# Patient Record
Sex: FEMALE | Race: WHITE | NOT HISPANIC OR LATINO | Employment: OTHER | URBAN - METROPOLITAN AREA
[De-identification: names, ages, dates, MRNs, and addresses within clinical notes are randomized per-mention and may not be internally consistent; named-entity substitution may affect disease eponyms.]

---

## 2018-02-19 ENCOUNTER — OFFICE VISIT CONVERTED (OUTPATIENT)
Dept: FAMILY MEDICINE CLINIC | Facility: CLINIC | Age: 78
End: 2018-02-19
Attending: FAMILY MEDICINE

## 2018-02-28 ENCOUNTER — OFFICE VISIT CONVERTED (OUTPATIENT)
Dept: ORTHOPEDIC SURGERY | Facility: CLINIC | Age: 78
End: 2018-02-28
Attending: PHYSICIAN ASSISTANT

## 2018-03-27 ENCOUNTER — OFFICE VISIT CONVERTED (OUTPATIENT)
Dept: FAMILY MEDICINE CLINIC | Facility: CLINIC | Age: 78
End: 2018-03-27
Attending: FAMILY MEDICINE

## 2018-03-27 ENCOUNTER — CONVERSION ENCOUNTER (OUTPATIENT)
Dept: FAMILY MEDICINE CLINIC | Facility: CLINIC | Age: 78
End: 2018-03-27

## 2018-05-11 ENCOUNTER — CONVERSION ENCOUNTER (OUTPATIENT)
Dept: FAMILY MEDICINE CLINIC | Facility: CLINIC | Age: 78
End: 2018-05-11

## 2018-05-11 ENCOUNTER — OFFICE VISIT CONVERTED (OUTPATIENT)
Dept: FAMILY MEDICINE CLINIC | Facility: CLINIC | Age: 78
End: 2018-05-11
Attending: FAMILY MEDICINE

## 2018-08-14 ENCOUNTER — OFFICE VISIT CONVERTED (OUTPATIENT)
Dept: FAMILY MEDICINE CLINIC | Facility: CLINIC | Age: 78
End: 2018-08-14
Attending: FAMILY MEDICINE

## 2018-08-14 ENCOUNTER — CONVERSION ENCOUNTER (OUTPATIENT)
Dept: FAMILY MEDICINE CLINIC | Facility: CLINIC | Age: 78
End: 2018-08-14

## 2018-12-21 ENCOUNTER — OFFICE VISIT CONVERTED (OUTPATIENT)
Dept: FAMILY MEDICINE CLINIC | Facility: CLINIC | Age: 78
End: 2018-12-21
Attending: FAMILY MEDICINE

## 2019-04-26 ENCOUNTER — HOSPITAL ENCOUNTER (OUTPATIENT)
Dept: FAMILY MEDICINE CLINIC | Facility: CLINIC | Age: 79
Discharge: HOME | End: 2019-04-26
Attending: FAMILY MEDICINE

## 2019-04-26 LAB
ALBUMIN SERPL-MCNC: 4.1 G/DL (ref 3.5–5)
ALBUMIN/GLOB SERPL: 1.2 {RATIO} (ref 1.4–2.6)
ALP SERPL-CCNC: 70 U/L (ref 43–160)
ALT SERPL-CCNC: 14 U/L (ref 10–40)
ANION GAP SERPL CALC-SCNC: 17 MMOL/L (ref 8–19)
AST SERPL-CCNC: 21 U/L (ref 15–50)
BILIRUB SERPL-MCNC: 0.55 MG/DL (ref 0.2–1.3)
BUN SERPL-MCNC: 18 MG/DL (ref 5–25)
BUN/CREAT SERPL: 16 {RATIO} (ref 6–20)
CALCIUM SERPL-MCNC: 9.4 MG/DL (ref 8.7–10.4)
CHLORIDE SERPL-SCNC: 99 MMOL/L (ref 99–111)
CHOLEST SERPL-MCNC: 177 MG/DL (ref 107–200)
CHOLEST/HDLC SERPL: 4 {RATIO} (ref 3–6)
CONV CO2: 26 MMOL/L (ref 22–32)
CONV TOTAL PROTEIN: 7.5 G/DL (ref 6.3–8.2)
CREAT UR-MCNC: 1.13 MG/DL (ref 0.5–0.9)
EST. AVERAGE GLUCOSE BLD GHB EST-MCNC: 169 MG/DL
GFR SERPLBLD BASED ON 1.73 SQ M-ARVRAT: 46 ML/MIN/{1.73_M2}
GLOBULIN UR ELPH-MCNC: 3.4 G/DL (ref 2–3.5)
GLUCOSE SERPL-MCNC: 107 MG/DL (ref 65–99)
HBA1C MFR BLD: 7.5 % (ref 3.5–5.7)
HDLC SERPL-MCNC: 44 MG/DL (ref 40–60)
LDLC SERPL CALC-MCNC: 106 MG/DL (ref 70–100)
OSMOLALITY SERPL CALC.SUM OF ELEC: 286 MOSM/KG (ref 273–304)
POTASSIUM SERPL-SCNC: 4.6 MMOL/L (ref 3.5–5.3)
SODIUM SERPL-SCNC: 137 MMOL/L (ref 135–147)
TRIGL SERPL-MCNC: 134 MG/DL (ref 40–150)
TSH SERPL-ACNC: 6.35 M[IU]/L (ref 0.27–4.2)
VLDLC SERPL-MCNC: 27 MG/DL (ref 5–37)

## 2019-05-01 ENCOUNTER — OFFICE VISIT CONVERTED (OUTPATIENT)
Dept: FAMILY MEDICINE CLINIC | Facility: CLINIC | Age: 79
End: 2019-05-01
Attending: NURSE PRACTITIONER

## 2019-06-05 ENCOUNTER — OFFICE VISIT CONVERTED (OUTPATIENT)
Dept: FAMILY MEDICINE CLINIC | Facility: CLINIC | Age: 79
End: 2019-06-05
Attending: FAMILY MEDICINE

## 2019-06-05 ENCOUNTER — CONVERSION ENCOUNTER (OUTPATIENT)
Dept: FAMILY MEDICINE CLINIC | Facility: CLINIC | Age: 79
End: 2019-06-05

## 2019-06-05 ENCOUNTER — HOSPITAL ENCOUNTER (OUTPATIENT)
Dept: FAMILY MEDICINE CLINIC | Facility: CLINIC | Age: 79
Discharge: HOME | End: 2019-06-05
Attending: FAMILY MEDICINE

## 2019-06-08 LAB
AMOXICILLIN+CLAV SUSC ISLT: <=2
BACTERIA UR CULT: ABNORMAL
CEFAZOLIN SUSC ISLT: >=64
CEFEPIME SUSC ISLT: <=1
CEFTAZIDIME SUSC ISLT: <=1
CEFTRIAXONE SUSC ISLT: <=1
CEFUROXIME ORAL SUSC ISLT: 4
CEFUROXIME PARENTER SUSC ISLT: 4
CIPROFLOXACIN SUSC ISLT: <=0.25
ERTAPENEM SUSC ISLT: <=0.5
GENTAMICIN SUSC ISLT: <=1
LEVOFLOXACIN SUSC ISLT: <=0.12
NITROFURANTOIN SUSC ISLT: <=16
TETRACYCLINE SUSC ISLT: <=1
TMP SMX SUSC ISLT: <=20
TOBRAMYCIN SUSC ISLT: <=1

## 2019-07-10 ENCOUNTER — HOSPITAL ENCOUNTER (OUTPATIENT)
Dept: FAMILY MEDICINE CLINIC | Facility: CLINIC | Age: 79
Discharge: HOME | End: 2019-07-10
Attending: FAMILY MEDICINE

## 2019-07-10 ENCOUNTER — CONVERSION ENCOUNTER (OUTPATIENT)
Dept: FAMILY MEDICINE CLINIC | Facility: CLINIC | Age: 79
End: 2019-07-10

## 2019-07-10 ENCOUNTER — OFFICE VISIT CONVERTED (OUTPATIENT)
Dept: FAMILY MEDICINE CLINIC | Facility: CLINIC | Age: 79
End: 2019-07-10
Attending: FAMILY MEDICINE

## 2019-07-10 LAB
ALBUMIN SERPL-MCNC: 4.4 G/DL (ref 3.5–5)
ALBUMIN/GLOB SERPL: 1.3 {RATIO} (ref 1.4–2.6)
ALP SERPL-CCNC: 73 U/L (ref 43–160)
ALT SERPL-CCNC: 14 U/L (ref 10–40)
ANION GAP SERPL CALC-SCNC: 19 MMOL/L (ref 8–19)
AST SERPL-CCNC: 17 U/L (ref 15–50)
BASOPHILS # BLD AUTO: 0.02 10*3/UL (ref 0–0.2)
BASOPHILS NFR BLD AUTO: 0.3 % (ref 0–3)
BILIRUB SERPL-MCNC: 0.4 MG/DL (ref 0.2–1.3)
BUN SERPL-MCNC: 16 MG/DL (ref 5–25)
BUN/CREAT SERPL: 17 {RATIO} (ref 6–20)
CALCIUM SERPL-MCNC: 9.7 MG/DL (ref 8.7–10.4)
CHLORIDE SERPL-SCNC: 101 MMOL/L (ref 99–111)
CHOLEST SERPL-MCNC: 199 MG/DL (ref 107–200)
CHOLEST/HDLC SERPL: 4.7 {RATIO} (ref 3–6)
CONV ABS IMM GRAN: 0.01 10*3/UL (ref 0–0.2)
CONV CO2: 27 MMOL/L (ref 22–32)
CONV IMMATURE GRAN: 0.1 % (ref 0–1.8)
CONV TOTAL PROTEIN: 7.7 G/DL (ref 6.3–8.2)
CREAT UR-MCNC: 0.95 MG/DL (ref 0.5–0.9)
DEPRECATED RDW RBC AUTO: 49 FL (ref 36.4–46.3)
EOSINOPHIL # BLD AUTO: 0.18 10*3/UL (ref 0–0.7)
EOSINOPHIL # BLD AUTO: 2.6 % (ref 0–7)
ERYTHROCYTE [DISTWIDTH] IN BLOOD BY AUTOMATED COUNT: 13.8 % (ref 11.7–14.4)
EST. AVERAGE GLUCOSE BLD GHB EST-MCNC: 186 MG/DL
GFR SERPLBLD BASED ON 1.73 SQ M-ARVRAT: 57 ML/MIN/{1.73_M2}
GLOBULIN UR ELPH-MCNC: 3.3 G/DL (ref 2–3.5)
GLUCOSE SERPL-MCNC: 141 MG/DL (ref 65–99)
HBA1C MFR BLD: 14.8 G/DL (ref 12–16)
HBA1C MFR BLD: 8.1 % (ref 3.5–5.7)
HCT VFR BLD AUTO: 46.1 % (ref 37–47)
HDLC SERPL-MCNC: 42 MG/DL (ref 40–60)
LDLC SERPL CALC-MCNC: 126 MG/DL (ref 70–100)
LYMPHOCYTES # BLD AUTO: 1.67 10*3/UL (ref 1–5)
MCH RBC QN AUTO: 30.5 PG (ref 27–31)
MCHC RBC AUTO-ENTMCNC: 32.1 G/DL (ref 33–37)
MCV RBC AUTO: 94.9 FL (ref 81–99)
MONOCYTES # BLD AUTO: 0.64 10*3/UL (ref 0.2–1.2)
MONOCYTES NFR BLD AUTO: 9.2 % (ref 3–10)
NEUTROPHILS # BLD AUTO: 4.47 10*3/UL (ref 2–8)
NEUTROPHILS NFR BLD AUTO: 63.9 % (ref 30–85)
NRBC CBCN: 0 % (ref 0–0.7)
OSMOLALITY SERPL CALC.SUM OF ELEC: 298 MOSM/KG (ref 273–304)
PLATELET # BLD AUTO: 240 10*3/UL (ref 130–400)
PMV BLD AUTO: 10.9 FL (ref 9.4–12.3)
POTASSIUM SERPL-SCNC: 4.9 MMOL/L (ref 3.5–5.3)
RBC # BLD AUTO: 4.86 10*6/UL (ref 4.2–5.4)
SODIUM SERPL-SCNC: 142 MMOL/L (ref 135–147)
T4 FREE SERPL-MCNC: 2 NG/DL (ref 0.9–1.8)
TRIGL SERPL-MCNC: 154 MG/DL (ref 40–150)
TSH SERPL-ACNC: 0.99 M[IU]/L (ref 0.27–4.2)
VARIANT LYMPHS NFR BLD MANUAL: 23.9 % (ref 20–45)
VLDLC SERPL-MCNC: 31 MG/DL (ref 5–37)
WBC # BLD AUTO: 6.99 10*3/UL (ref 4.8–10.8)

## 2019-07-11 ENCOUNTER — HOSPITAL ENCOUNTER (OUTPATIENT)
Dept: CT IMAGING | Facility: HOSPITAL | Age: 79
Discharge: HOME | End: 2019-07-11
Attending: FAMILY MEDICINE

## 2019-07-12 LAB
AMOXICILLIN+CLAV SUSC ISLT: >=32
AMPICILLIN SUSC ISLT: >=32
AMPICILLIN+SULBAC SUSC ISLT: >=32
BACTERIA UR CULT: ABNORMAL
CEFAZOLIN SUSC ISLT: >=64
CEFEPIME SUSC ISLT: <=1
CEFTAZIDIME SUSC ISLT: >=64
CEFTRIAXONE SUSC ISLT: >=64
CEFUROXIME ORAL SUSC ISLT: >=64
CEFUROXIME PARENTER SUSC ISLT: >=64
CIPROFLOXACIN SUSC ISLT: <=0.25
ERTAPENEM SUSC ISLT: <=0.5
GENTAMICIN SUSC ISLT: >=16
LEVOFLOXACIN SUSC ISLT: <=0.12
NITROFURANTOIN SUSC ISLT: <=16
TETRACYCLINE SUSC ISLT: 2
TMP SMX SUSC ISLT: <=20
TOBRAMYCIN SUSC ISLT: >=16

## 2019-07-24 ENCOUNTER — HOSPITAL ENCOUNTER (OUTPATIENT)
Dept: URGENT CARE | Facility: CLINIC | Age: 79
Discharge: HOME | End: 2019-07-24

## 2019-07-26 ENCOUNTER — HOSPITAL ENCOUNTER (OUTPATIENT)
Dept: PHYSICAL THERAPY | Facility: CLINIC | Age: 79
Setting detail: RECURRING SERIES
Discharge: HOME | End: 2019-09-19
Attending: FAMILY MEDICINE

## 2019-08-05 ENCOUNTER — OFFICE VISIT CONVERTED (OUTPATIENT)
Dept: ORTHOPEDIC SURGERY | Facility: CLINIC | Age: 79
End: 2019-08-05
Attending: ORTHOPAEDIC SURGERY

## 2019-08-26 ENCOUNTER — CONVERSION ENCOUNTER (OUTPATIENT)
Dept: FAMILY MEDICINE CLINIC | Facility: CLINIC | Age: 79
End: 2019-08-26

## 2019-08-26 ENCOUNTER — OFFICE VISIT CONVERTED (OUTPATIENT)
Dept: FAMILY MEDICINE CLINIC | Facility: CLINIC | Age: 79
End: 2019-08-26
Attending: FAMILY MEDICINE

## 2019-09-06 ENCOUNTER — APPOINTMENT (OUTPATIENT)
Dept: PREADMISSION TESTING | Facility: HOSPITAL | Age: 79
End: 2019-09-06

## 2019-09-06 VITALS
WEIGHT: 186.5 LBS | BODY MASS INDEX: 29.97 KG/M2 | RESPIRATION RATE: 16 BRPM | DIASTOLIC BLOOD PRESSURE: 78 MMHG | HEART RATE: 81 BPM | SYSTOLIC BLOOD PRESSURE: 122 MMHG | OXYGEN SATURATION: 97 % | TEMPERATURE: 97 F | HEIGHT: 66 IN

## 2019-09-06 LAB
ANION GAP SERPL CALCULATED.3IONS-SCNC: 9.6 MMOL/L (ref 5–15)
BUN BLD-MCNC: 19 MG/DL (ref 8–23)
BUN/CREAT SERPL: 16.2 (ref 7–25)
CALCIUM SPEC-SCNC: 9.5 MG/DL (ref 8.6–10.5)
CHLORIDE SERPL-SCNC: 99 MMOL/L (ref 98–107)
CO2 SERPL-SCNC: 29.4 MMOL/L (ref 22–29)
CREAT BLD-MCNC: 1.17 MG/DL (ref 0.57–1)
DEPRECATED RDW RBC AUTO: 48.5 FL (ref 37–54)
ERYTHROCYTE [DISTWIDTH] IN BLOOD BY AUTOMATED COUNT: 13.8 % (ref 12.3–15.4)
GFR SERPL CREATININE-BSD FRML MDRD: 45 ML/MIN/1.73
GLUCOSE BLD-MCNC: 241 MG/DL (ref 65–99)
HCT VFR BLD AUTO: 46.7 % (ref 34–46.6)
HGB BLD-MCNC: 14.9 G/DL (ref 12–15.9)
MCH RBC QN AUTO: 30 PG (ref 26.6–33)
MCHC RBC AUTO-ENTMCNC: 31.9 G/DL (ref 31.5–35.7)
MCV RBC AUTO: 94.2 FL (ref 79–97)
PLATELET # BLD AUTO: 224 10*3/MM3 (ref 140–450)
PMV BLD AUTO: 10.8 FL (ref 6–12)
POTASSIUM BLD-SCNC: 4.5 MMOL/L (ref 3.5–5.2)
RBC # BLD AUTO: 4.96 10*6/MM3 (ref 3.77–5.28)
SODIUM BLD-SCNC: 138 MMOL/L (ref 136–145)
WBC NRBC COR # BLD: 6.12 10*3/MM3 (ref 3.4–10.8)

## 2019-09-06 PROCEDURE — 93005 ELECTROCARDIOGRAM TRACING: CPT

## 2019-09-06 PROCEDURE — 80048 BASIC METABOLIC PNL TOTAL CA: CPT | Performed by: UROLOGY

## 2019-09-06 PROCEDURE — 36415 COLL VENOUS BLD VENIPUNCTURE: CPT

## 2019-09-06 PROCEDURE — 85027 COMPLETE CBC AUTOMATED: CPT | Performed by: UROLOGY

## 2019-09-06 PROCEDURE — 93010 ELECTROCARDIOGRAM REPORT: CPT | Performed by: INTERNAL MEDICINE

## 2019-09-06 RX ORDER — LEVOTHYROXINE SODIUM 112 UG/1
112 TABLET ORAL DAILY
COMMUNITY
End: 2021-11-16 | Stop reason: SDUPTHER

## 2019-09-06 RX ORDER — EZETIMIBE 10 MG/1
10 TABLET ORAL DAILY
COMMUNITY
End: 2021-06-16

## 2019-09-06 NOTE — DISCHARGE INSTRUCTIONS
ARRIVAL TIME 08:30        General Instructions:  • Do not eat or drink anything after midnight the night before surgery.  • Infants may have breast milk up to four hours before surgery.  • Infants drinking formula may drink formula up to six hours before surgery.   • Patients who avoid smoking, chewing tobacco and alcohol for 4 weeks prior to surgery have a reduced risk of post-operative complications.  Quit smoking as many days before surgery as you can.  • Do not smoke, use chewing tobacco or drink alcohol the day of surgery.   • If applicable bring your C-PAP/ BI-PAP machine.  • Bring any papers given to you in the doctor’s office.  • Wear clean comfortable clothes and socks.  • Do not wear contact lenses, false eyelashes or make-up.  Bring a case for your glasses.   • Bring crutches or walker if applicable.  • Remove all piercings.  Leave jewelry and any other valuables at home.  • Hair extensions with metal clips must be removed prior to surgery.  • The Pre-Admission Testing nurse will instruct you to bring medications if unable to obtain an accurate list in Pre-Admission Testing.            Preventing a Surgical Site Infection:  • For 2 to 3 days before surgery, avoid shaving with a razor because the razor can irritate skin and make it easier to develop an infection.    • Any areas of open skin can increase the risk of a post-operative wound infection by allowing bacteria to enter and travel throughout the body.  Notify your surgeon if you have any skin wounds / rashes even if it is not near the expected surgical site.  The area will need assessed to determine if surgery should be delayed until it is healed.  • The night prior to surgery sleep in a clean bed with clean clothing.  Do not allow pets to sleep with you.  • Shower on the morning of surgery using a fresh bar of anti-bacterial soap (such as Dial) and clean washcloth.  Dry with a clean towel and dress in clean clothing.  • Ask your surgeon if you will  be receiving antibiotics prior to surgery.  • Make sure you, your family, and all healthcare providers clean their hands with soap and water or an alcohol based hand  before caring for you or your wound.    Day of surgery:  Upon arrival, a Pre-op nurse and Anesthesiologist will review your health history, obtain vital signs, and answer questions you may have.  The only belongings needed at this time will be your home medications and if applicable your C-PAP/BI-PAP machine.  If you are staying overnight your family can leave the rest of your belongings in the car and bring them to your room later.  A Pre-op nurse will start an IV and you may receive medication in preparation for surgery, including something to help you relax.  Your family will be able to see you in the Pre-op area.  While you are in surgery your family should notify the waiting room  if they leave the waiting room area and provide a contact phone number.    Please be aware that surgery does come with discomfort.  We want to make every effort to control your discomfort so please discuss any uncontrolled symptoms with your nurse.   Your doctor will most likely have prescribed pain medications.      If you are going home after surgery you will receive individualized written care instructions before being discharged.  A responsible adult must drive you to and from the hospital on the day of your surgery and stay with you for 24 hours.    If you are staying overnight following surgery, you will be transported to your hospital room following the recovery period.  [facility] has all private rooms.    You have received a list of surgical assistants for your reference.  If you have any questions please call Pre-Admission Testing at [phone].  Deductibles and co-payments are collected on the day of service. Please be prepared to pay the required co-pay, deductible or deposit on the day of service as defined by your plan.

## 2019-09-13 ENCOUNTER — ANESTHESIA (OUTPATIENT)
Dept: PERIOP | Facility: HOSPITAL | Age: 79
End: 2019-09-13

## 2019-09-13 ENCOUNTER — HOSPITAL ENCOUNTER (OUTPATIENT)
Facility: HOSPITAL | Age: 79
Setting detail: HOSPITAL OUTPATIENT SURGERY
Discharge: HOME | End: 2019-09-13
Attending: UROLOGY | Admitting: UROLOGY

## 2019-09-13 ENCOUNTER — ANESTHESIA EVENT (OUTPATIENT)
Dept: PERIOP | Facility: HOSPITAL | Age: 79
End: 2019-09-13

## 2019-09-13 VITALS
WEIGHT: 186.73 LBS | BODY MASS INDEX: 30.01 KG/M2 | RESPIRATION RATE: 16 BRPM | OXYGEN SATURATION: 96 % | HEART RATE: 75 BPM | HEIGHT: 66 IN | DIASTOLIC BLOOD PRESSURE: 71 MMHG | SYSTOLIC BLOOD PRESSURE: 149 MMHG | TEMPERATURE: 97.9 F

## 2019-09-13 PROBLEM — N20.0 RENAL CALCULUS, LEFT: Status: ACTIVE | Noted: 2019-09-13

## 2019-09-13 LAB
GLUCOSE BLDC GLUCOMTR-MCNC: 168 MG/DL (ref 70–130)
GLUCOSE BLDC GLUCOMTR-MCNC: 169 MG/DL (ref 70–130)
GLUCOSE BLDC GLUCOMTR-MCNC: 170 MG/DL (ref 70–130)

## 2019-09-13 PROCEDURE — 25010000002 MIDAZOLAM PER 1 MG: Performed by: ANESTHESIOLOGY

## 2019-09-13 PROCEDURE — 82962 GLUCOSE BLOOD TEST: CPT

## 2019-09-13 PROCEDURE — 25010000002 ONDANSETRON PER 1 MG: Performed by: NURSE ANESTHETIST, CERTIFIED REGISTERED

## 2019-09-13 PROCEDURE — 25010000002 FENTANYL CITRATE (PF) 100 MCG/2ML SOLUTION: Performed by: NURSE ANESTHETIST, CERTIFIED REGISTERED

## 2019-09-13 PROCEDURE — 25010000002 PROPOFOL 10 MG/ML EMULSION: Performed by: NURSE ANESTHETIST, CERTIFIED REGISTERED

## 2019-09-13 RX ORDER — CEFAZOLIN SODIUM 1 G/50ML
1 INJECTION, SOLUTION INTRAVENOUS
Status: DISCONTINUED | OUTPATIENT
Start: 2019-09-13 | End: 2019-09-13 | Stop reason: HOSPADM

## 2019-09-13 RX ORDER — PROMETHAZINE HYDROCHLORIDE 25 MG/1
25 SUPPOSITORY RECTAL ONCE AS NEEDED
Status: CANCELLED | OUTPATIENT
Start: 2019-09-13

## 2019-09-13 RX ORDER — ONDANSETRON 2 MG/ML
INJECTION INTRAMUSCULAR; INTRAVENOUS AS NEEDED
Status: DISCONTINUED | OUTPATIENT
Start: 2019-09-13 | End: 2019-09-13 | Stop reason: SURG

## 2019-09-13 RX ORDER — HYDRALAZINE HYDROCHLORIDE 20 MG/ML
5 INJECTION INTRAMUSCULAR; INTRAVENOUS
Status: CANCELLED | OUTPATIENT
Start: 2019-09-13

## 2019-09-13 RX ORDER — PROMETHAZINE HYDROCHLORIDE 25 MG/1
25 TABLET ORAL ONCE AS NEEDED
Status: CANCELLED | OUTPATIENT
Start: 2019-09-13

## 2019-09-13 RX ORDER — FAMOTIDINE 10 MG/ML
20 INJECTION, SOLUTION INTRAVENOUS ONCE
Status: COMPLETED | OUTPATIENT
Start: 2019-09-13 | End: 2019-09-13

## 2019-09-13 RX ORDER — SODIUM CHLORIDE 0.9 % (FLUSH) 0.9 %
3-10 SYRINGE (ML) INJECTION AS NEEDED
Status: DISCONTINUED | OUTPATIENT
Start: 2019-09-13 | End: 2019-09-13 | Stop reason: HOSPADM

## 2019-09-13 RX ORDER — LIDOCAINE HYDROCHLORIDE 10 MG/ML
0.5 INJECTION, SOLUTION EPIDURAL; INFILTRATION; INTRACAUDAL; PERINEURAL ONCE AS NEEDED
Status: COMPLETED | OUTPATIENT
Start: 2019-09-13 | End: 2019-09-13

## 2019-09-13 RX ORDER — PROMETHAZINE HYDROCHLORIDE 25 MG/ML
6.25 INJECTION, SOLUTION INTRAMUSCULAR; INTRAVENOUS
Status: CANCELLED | OUTPATIENT
Start: 2019-09-13

## 2019-09-13 RX ORDER — SULFAMETHOXAZOLE AND TRIMETHOPRIM 800; 160 MG/1; MG/1
1 TABLET ORAL 2 TIMES DAILY
Qty: 6 TABLET | Refills: 0 | Status: SHIPPED | OUTPATIENT
Start: 2019-09-13 | End: 2021-06-16

## 2019-09-13 RX ORDER — PROPOFOL 10 MG/ML
VIAL (ML) INTRAVENOUS AS NEEDED
Status: DISCONTINUED | OUTPATIENT
Start: 2019-09-13 | End: 2019-09-13 | Stop reason: SURG

## 2019-09-13 RX ORDER — FENTANYL CITRATE 50 UG/ML
INJECTION, SOLUTION INTRAMUSCULAR; INTRAVENOUS AS NEEDED
Status: DISCONTINUED | OUTPATIENT
Start: 2019-09-13 | End: 2019-09-13 | Stop reason: SURG

## 2019-09-13 RX ORDER — DIPHENHYDRAMINE HCL 25 MG
25 CAPSULE ORAL
Status: CANCELLED | OUTPATIENT
Start: 2019-09-13

## 2019-09-13 RX ORDER — MIDAZOLAM HYDROCHLORIDE 1 MG/ML
2 INJECTION INTRAMUSCULAR; INTRAVENOUS
Status: DISCONTINUED | OUTPATIENT
Start: 2019-09-13 | End: 2019-09-13 | Stop reason: HOSPADM

## 2019-09-13 RX ORDER — FENTANYL CITRATE 50 UG/ML
50 INJECTION, SOLUTION INTRAMUSCULAR; INTRAVENOUS
Status: DISCONTINUED | OUTPATIENT
Start: 2019-09-13 | End: 2019-09-13 | Stop reason: HOSPADM

## 2019-09-13 RX ORDER — ACETAMINOPHEN 650 MG/1
650 SUPPOSITORY RECTAL ONCE AS NEEDED
Status: CANCELLED | OUTPATIENT
Start: 2019-09-13

## 2019-09-13 RX ORDER — NALOXONE HCL 0.4 MG/ML
0.2 VIAL (ML) INJECTION AS NEEDED
Status: CANCELLED | OUTPATIENT
Start: 2019-09-13

## 2019-09-13 RX ORDER — ACETAMINOPHEN 325 MG/1
650 TABLET ORAL ONCE AS NEEDED
Status: CANCELLED | OUTPATIENT
Start: 2019-09-13

## 2019-09-13 RX ORDER — SODIUM CHLORIDE 0.9 % (FLUSH) 0.9 %
3 SYRINGE (ML) INJECTION EVERY 12 HOURS SCHEDULED
Status: DISCONTINUED | OUTPATIENT
Start: 2019-09-13 | End: 2019-09-13 | Stop reason: HOSPADM

## 2019-09-13 RX ORDER — MIDAZOLAM HYDROCHLORIDE 1 MG/ML
1 INJECTION INTRAMUSCULAR; INTRAVENOUS
Status: DISCONTINUED | OUTPATIENT
Start: 2019-09-13 | End: 2019-09-13 | Stop reason: HOSPADM

## 2019-09-13 RX ORDER — FLUMAZENIL 0.1 MG/ML
0.2 INJECTION INTRAVENOUS AS NEEDED
Status: CANCELLED | OUTPATIENT
Start: 2019-09-13

## 2019-09-13 RX ORDER — SODIUM CHLORIDE, SODIUM LACTATE, POTASSIUM CHLORIDE, CALCIUM CHLORIDE 600; 310; 30; 20 MG/100ML; MG/100ML; MG/100ML; MG/100ML
9 INJECTION, SOLUTION INTRAVENOUS CONTINUOUS
Status: DISCONTINUED | OUTPATIENT
Start: 2019-09-13 | End: 2019-09-13 | Stop reason: HOSPADM

## 2019-09-13 RX ORDER — FENTANYL CITRATE 50 UG/ML
50 INJECTION, SOLUTION INTRAMUSCULAR; INTRAVENOUS
Status: CANCELLED | OUTPATIENT
Start: 2019-09-13

## 2019-09-13 RX ORDER — ONDANSETRON 2 MG/ML
4 INJECTION INTRAMUSCULAR; INTRAVENOUS ONCE AS NEEDED
Status: CANCELLED | OUTPATIENT
Start: 2019-09-13

## 2019-09-13 RX ORDER — DIPHENHYDRAMINE HYDROCHLORIDE 50 MG/ML
12.5 INJECTION INTRAMUSCULAR; INTRAVENOUS
Status: CANCELLED | OUTPATIENT
Start: 2019-09-13

## 2019-09-13 RX ORDER — PROMETHAZINE HYDROCHLORIDE 25 MG/ML
12.5 INJECTION, SOLUTION INTRAMUSCULAR; INTRAVENOUS ONCE AS NEEDED
Status: CANCELLED | OUTPATIENT
Start: 2019-09-13

## 2019-09-13 RX ORDER — EPHEDRINE SULFATE 50 MG/ML
5 INJECTION, SOLUTION INTRAVENOUS ONCE AS NEEDED
Status: CANCELLED | OUTPATIENT
Start: 2019-09-13

## 2019-09-13 RX ORDER — OXYCODONE AND ACETAMINOPHEN 7.5; 325 MG/1; MG/1
1-2 TABLET ORAL EVERY 4 HOURS PRN
Qty: 20 TABLET | Refills: 0 | Status: SHIPPED | OUTPATIENT
Start: 2019-09-13 | End: 2021-06-16

## 2019-09-13 RX ADMIN — FAMOTIDINE 20 MG: 10 INJECTION, SOLUTION INTRAVENOUS at 09:50

## 2019-09-13 RX ADMIN — PROPOFOL 150 MG: 10 INJECTION, EMULSION INTRAVENOUS at 10:45

## 2019-09-13 RX ADMIN — ONDANSETRON 4 MG: 2 INJECTION INTRAMUSCULAR; INTRAVENOUS at 10:50

## 2019-09-13 RX ADMIN — FENTANYL CITRATE 25 MCG: 50 INJECTION INTRAMUSCULAR; INTRAVENOUS at 10:49

## 2019-09-13 RX ADMIN — FENTANYL CITRATE 25 MCG: 50 INJECTION INTRAMUSCULAR; INTRAVENOUS at 10:52

## 2019-09-13 RX ADMIN — MIDAZOLAM 1 MG: 1 INJECTION INTRAMUSCULAR; INTRAVENOUS at 10:07

## 2019-09-13 RX ADMIN — SODIUM CHLORIDE, POTASSIUM CHLORIDE, SODIUM LACTATE AND CALCIUM CHLORIDE 9 ML/HR: 600; 310; 30; 20 INJECTION, SOLUTION INTRAVENOUS at 09:47

## 2019-09-13 RX ADMIN — LIDOCAINE HYDROCHLORIDE 0.5 ML: 10 INJECTION, SOLUTION EPIDURAL; INFILTRATION; INTRACAUDAL; PERINEURAL at 09:47

## 2019-09-13 NOTE — H&P
FIRST UROLOGY CONSULT      Patient Identification:  NAME:  Sabi Haynes  Age:  78 y.o.   Sex:  female   :  1940   MRN:  [id number]       Chief complaint: Left renal calculus    History of present illness: 78-year-old female with urinary incontinence retention neurogenic bladder on intermittent self cath daily had an ultrasound showing kidney stone in the left kidney and a CT scan done at [facility] which confirmed nephrolithiasis on the left side apparently she has a 7 mm mid pole stone on the left and is scheduled for surgical treatment    Past medical history:  Past Medical History:   Diagnosis Date   • Arthritis    • Bladder paralysis     DUE TO SPINAL STENOSIS   • Blind spot scotoma, left    • DDD (degenerative disc disease), cervical    • DDD (degenerative disc disease), lumbar    • Diabetes mellitus (CMS/HCC)    • Gastric ulcer    • Hearing loss    • History of confusion    • Hyperlipidemia    • Hypothyroidism    • Incontinence of bowel     DUE TO SPINAL STENOSIS   • Incontinence of urine     DUE TO SPINAL STENOSIS   • Macular degeneration    • Renal calculi    • Right BBB/left ant fasc block    • Self-catheterizes urinary bladder     DUE TO SPINAL STENOSIS   • Short-term memory loss     MILD   • Spinal stenosis    • Vitamin D deficiency        Past surgical history:  Past Surgical History:   Procedure Laterality Date   • CHOLECYSTECTOMY     • HYSTERECTOMY     • LAPAROSCOPIC TUBAL LIGATION     • TEAR DUCT SURGERY     • VARICOSE VEIN SURGERY         Allergies:  Aspirin; Atenolol; Atorvastatin; Digoxin; Ibuprofen; Meperidine; Metformin; Morphine; Rifampin; and Latex    Home medications:  Medications Prior to Admission   Medication Sig Dispense Refill Last Dose   • Cholecalciferol (VITAMIN D3) 5000 units capsule capsule Take 5,000 Units by mouth Daily.      • ezetimibe (ZETIA) 10 MG tablet Take 10 mg by mouth Daily.      • Insulin Glargine (LANTUS SOLOSTAR) 100 UNIT/ML injection  pen Inject 45 Units under the skin into the appropriate area as directed Every Night.      • levothyroxine (SYNTHROID, LEVOTHROID) 112 MCG tablet Take 112 mcg by mouth Daily.           Hospital medications:    ceFAZolin 1 g Intravenous 30 Min Pre-Op   famotidine 20 mg Intravenous Once   sodium chloride 3 mL Intravenous Q12H       lactated ringers 9 mL/hr     fentanyl  •  lidocaine PF 1%  •  midazolam **OR** midazolam  •  sodium chloride    Family history:  Family History   Problem Relation Age of Onset   • Malig Hyperthermia Neg Hx        Social history:  Social History     Tobacco Use   • Smoking status: Never Smoker   • Smokeless tobacco: Never Used   Substance Use Topics   • Alcohol use: No     Frequency: Never   • Drug use: No       Review of systems:      Positive for: Spinal stenosis leading to a neurogenic bladder  Negative for:     Objective:  TMax 24 hours:   No data recorded.      Vitals Ranges:        Intake/Output Last 3 shifts:  No intake/output data recorded.     Physical Exam:    General Appearance:    Alert, cooperative, NAD   HEENT:    No trauma, pupils reactive, hearing intact   Back:     No CVA tenderness   Lungs:     Respirations unlabored, no wheezing    Heart:    RRR, intact peripheral pulses   Abdomen:     Soft, NDNT, no masses, no guarding   :    Pelvic not performed, bladder non distended and non tender   Extremities:   No edema, no deformity   Lymphatic:   No neck or groin LAD   Skin:   No bleeding, bruising or rashes   Neuro/Psych:   Orientation intact, mood/affect pleasant, no focal findings       Results review:   I reviewed the patient's new clinical results.    Data review:  Lab Results (last 24 hours)     ** No results found for the last 24 hours. **           Imaging:  Imaging Results (last 24 hours)     ** No results found for the last 24 hours. **             Assessment:       * No active hospital problems. *    Left renal calculus    Plan:     Left ESWL    Pipe Pat,  MD  09/13/19  9:05 AM

## 2019-09-13 NOTE — ANESTHESIA PROCEDURE NOTES
Airway  Urgency: elective    Date/Time: 9/13/2019 10:47 AM  Airway not difficult    General Information and Staff    Patient location during procedure: OR  Anesthesiologist: Julian Banuelos MD  CRNA: Chrissy Maxwell CRNA    Indications and Patient Condition  Indications for airway management: airway protection    Preoxygenated: yes  MILS not maintained throughout  Mask difficulty assessment: 1 - vent by mask    Final Airway Details  Final airway type: supraglottic airway      Successful airway: classic  Size 4    Number of attempts at approach: 1    Additional Comments  Preoxygenation FEO2 >85, SIVI, LMA placed with ease, teeth/lips as preop. Secured and placement confirmed.

## 2019-09-13 NOTE — PERIOPERATIVE NURSING NOTE
Pt sitting in bed, talking to her grand-daughter.  She states that her abdominal pain has improved, and that the crackers she is eating is making her feel better.

## 2019-09-13 NOTE — OP NOTE
Operative Report     [facility]    Patient: Sabi Haynes  Age:      78 y.o.  :     1940  Sex:      female    Medical Record:  [id number]    Date of Operation/Procedure:  2019    Pre-op Diagnosis:   Left renal calculus    Post-Op Diagnosis Codes:  Same    Pre-operative Diagnosis Free Text:  * No pre-op diagnosis entered *     Name of Operation/Procedure:  Procedure(s) and Anesthesia Type:     * EXTRACORPOREAL SHOCKWAVE LITHOTRIPSY - General    Findings/Complications: 7 mm left renal stone  Description of procedure: After successful induction of general anesthesia patient was placed in the litho-suite.  We image the area of her left kidney and found a 7 mm stone.  Had previously been documented on preoperative studies.  She was treated 3000 shocks maximum power setting of 24 KV with good fragmentation of the stone.  Patient tolerated procedure without complication was taken from the operating room in satisfactory condition    Estimated Blood Loss: none    Specimens: * No orders in the log *    Fluids/Drains: None    Pipe Pat MD  2019  11:06 AM

## 2019-09-13 NOTE — ANESTHESIA POSTPROCEDURE EVALUATION
"Patient: Sabi Haynes    Procedure Summary     Date:  09/13/19 Room / Location:   [facility] OR  /  [facility]    Anesthesia Start:  1038 Anesthesia Stop:  1124    Procedure:  EXTRACORPOREAL SHOCKWAVE LITHOTRIPSY (Left ) Diagnosis:      Surgeon:  Pipe Pat MD Provider:  Julian Banuelos MD    Anesthesia Type:  general ASA Status:  3          Anesthesia Type: general  Last vitals  BP   151/75 (09/13/19 1150)   Temp   (P) 36.6 °C (97.8 °F) (09/13/19 1123)   Pulse   95 (09/13/19 1150)   Resp   16 (09/13/19 1150)     SpO2   100 % (09/13/19 1150)     Post Anesthesia Care and Evaluation    Patient location during evaluation: bedside  Patient participation: complete - patient participated  Level of consciousness: awake  Pain score: 2  Pain management: adequate  Airway patency: patent  Anesthetic complications: No anesthetic complications    Cardiovascular status: acceptable  Respiratory status: acceptable  Hydration status: acceptable    Comments: /75   Pulse 95   Temp (P) 36.6 °C (97.8 °F) (Oral)   Resp 16   Ht 167.6 cm (65.98\")   Wt 84.7 kg (186 lb 11.7 oz)   SpO2 100%   BMI 30.15 kg/m²         "

## 2019-09-13 NOTE — ANESTHESIA PREPROCEDURE EVALUATION
Anesthesia Evaluation     Patient summary reviewed and Nursing notes reviewed   NPO Solid Status: > 8 hours  NPO Liquid Status: > 2 hours           Airway   Mallampati: II  no difficulty expected  Dental - normal exam   (+) edentulous, upper dentures and lower dentures    Pulmonary     breath sounds clear to auscultation  Cardiovascular     ECG reviewed  Rhythm: regular  Rate: normal    (+) dysrhythmias, hyperlipidemia,     ROS comment: RBBB    Neuro/Psych  GI/Hepatic/Renal/Endo    (+)   diabetes mellitus, hypothyroidism,     Musculoskeletal     (+) back pain,   Abdominal    Substance History      OB/GYN          Other   (+) arthritis                   Anesthesia Plan    ASA 3     general     intravenous induction   Anesthetic plan, all risks, benefits, and alternatives have been provided, discussed and informed consent has been obtained with: patient.

## 2019-09-26 ENCOUNTER — OFFICE VISIT CONVERTED (OUTPATIENT)
Dept: FAMILY MEDICINE CLINIC | Facility: CLINIC | Age: 79
End: 2019-09-26
Attending: FAMILY MEDICINE

## 2019-09-26 ENCOUNTER — HOSPITAL ENCOUNTER (OUTPATIENT)
Dept: FAMILY MEDICINE CLINIC | Facility: CLINIC | Age: 79
Discharge: HOME | End: 2019-09-26
Attending: FAMILY MEDICINE

## 2019-09-26 LAB
ALBUMIN SERPL-MCNC: 4.4 G/DL (ref 3.5–5)
ALBUMIN/GLOB SERPL: 1.4 {RATIO} (ref 1.4–2.6)
ALP SERPL-CCNC: 69 U/L (ref 43–160)
ALT SERPL-CCNC: 14 U/L (ref 10–40)
ANION GAP SERPL CALC-SCNC: 19 MMOL/L (ref 8–19)
AST SERPL-CCNC: 18 U/L (ref 15–50)
BASOPHILS # BLD AUTO: 0.04 10*3/UL (ref 0–0.2)
BASOPHILS NFR BLD AUTO: 0.7 % (ref 0–3)
BILIRUB SERPL-MCNC: 0.39 MG/DL (ref 0.2–1.3)
BUN SERPL-MCNC: 21 MG/DL (ref 5–25)
BUN/CREAT SERPL: 21 {RATIO} (ref 6–20)
CALCIUM SERPL-MCNC: 9.8 MG/DL (ref 8.7–10.4)
CHLORIDE SERPL-SCNC: 102 MMOL/L (ref 99–111)
CHOLEST SERPL-MCNC: 227 MG/DL (ref 107–200)
CHOLEST/HDLC SERPL: 5.2 {RATIO} (ref 3–6)
CONV ABS IMM GRAN: 0.02 10*3/UL (ref 0–0.2)
CONV CO2: 25 MMOL/L (ref 22–32)
CONV IMMATURE GRAN: 0.3 % (ref 0–1.8)
CONV TOTAL PROTEIN: 7.6 G/DL (ref 6.3–8.2)
CREAT UR-MCNC: 1.02 MG/DL (ref 0.5–0.9)
DEPRECATED RDW RBC AUTO: 47.9 FL (ref 36.4–46.3)
EOSINOPHIL # BLD AUTO: 0.18 10*3/UL (ref 0–0.7)
EOSINOPHIL # BLD AUTO: 2.9 % (ref 0–7)
ERYTHROCYTE [DISTWIDTH] IN BLOOD BY AUTOMATED COUNT: 13.7 % (ref 11.7–14.4)
EST. AVERAGE GLUCOSE BLD GHB EST-MCNC: 192 MG/DL
GFR SERPLBLD BASED ON 1.73 SQ M-ARVRAT: 52 ML/MIN/{1.73_M2}
GLOBULIN UR ELPH-MCNC: 3.2 G/DL (ref 2–3.5)
GLUCOSE SERPL-MCNC: 131 MG/DL (ref 65–99)
HBA1C MFR BLD: 8.3 % (ref 3.5–5.7)
HCT VFR BLD AUTO: 44.7 % (ref 37–47)
HDLC SERPL-MCNC: 44 MG/DL (ref 40–60)
HGB BLD-MCNC: 14.4 G/DL (ref 12–16)
LDLC SERPL CALC-MCNC: 148 MG/DL (ref 70–100)
LYMPHOCYTES # BLD AUTO: 1.92 10*3/UL (ref 1–5)
LYMPHOCYTES NFR BLD AUTO: 31.2 % (ref 20–45)
MCH RBC QN AUTO: 30.3 PG (ref 27–31)
MCHC RBC AUTO-ENTMCNC: 32.2 G/DL (ref 33–37)
MCV RBC AUTO: 94.1 FL (ref 81–99)
MONOCYTES # BLD AUTO: 0.59 10*3/UL (ref 0.2–1.2)
MONOCYTES NFR BLD AUTO: 9.6 % (ref 3–10)
NEUTROPHILS # BLD AUTO: 3.4 10*3/UL (ref 2–8)
NEUTROPHILS NFR BLD AUTO: 55.3 % (ref 30–85)
NRBC CBCN: 0 % (ref 0–0.7)
OSMOLALITY SERPL CALC.SUM OF ELEC: 297 MOSM/KG (ref 273–304)
PLATELET # BLD AUTO: 252 10*3/UL (ref 130–400)
PMV BLD AUTO: 10.9 FL (ref 9.4–12.3)
POTASSIUM SERPL-SCNC: 4.5 MMOL/L (ref 3.5–5.3)
RBC # BLD AUTO: 4.75 10*6/UL (ref 4.2–5.4)
SODIUM SERPL-SCNC: 141 MMOL/L (ref 135–147)
T4 FREE SERPL-MCNC: 1.4 NG/DL (ref 0.9–1.8)
TRIGL SERPL-MCNC: 175 MG/DL (ref 40–150)
TSH SERPL-ACNC: 1.11 M[IU]/L (ref 0.27–4.2)
VLDLC SERPL-MCNC: 35 MG/DL (ref 5–37)
WBC # BLD AUTO: 6.15 10*3/UL (ref 4.8–10.8)

## 2019-09-27 ENCOUNTER — HOSPITAL ENCOUNTER (OUTPATIENT)
Dept: FAMILY MEDICINE CLINIC | Facility: CLINIC | Age: 79
Discharge: HOME | End: 2019-09-27
Attending: FAMILY MEDICINE

## 2019-09-27 LAB
CONV CREATININE URINE, RANDOM: 150.6 MG/DL (ref 10–300)
CONV MICROALBUM.,U,RANDOM: <12 MG/L (ref 0–20)
MICROALBUMIN/CREAT UR: 8 MG/G{CRE} (ref 0–35)

## 2019-10-04 ENCOUNTER — HOSPITAL ENCOUNTER (OUTPATIENT)
Dept: GENERAL RADIOLOGY | Facility: HOSPITAL | Age: 79
Discharge: HOME | End: 2019-10-04
Admitting: UROLOGY

## 2019-10-04 DIAGNOSIS — N20.0 URIC ACID NEPHROLITHIASIS: ICD-10-CM

## 2019-10-04 PROCEDURE — 74018 RADEX ABDOMEN 1 VIEW: CPT

## 2020-03-06 ENCOUNTER — CONVERSION ENCOUNTER (OUTPATIENT)
Dept: ORTHOPEDIC SURGERY | Facility: CLINIC | Age: 80
End: 2020-03-06

## 2020-03-06 ENCOUNTER — OFFICE VISIT CONVERTED (OUTPATIENT)
Dept: ORTHOPEDIC SURGERY | Facility: CLINIC | Age: 80
End: 2020-03-06
Attending: ORTHOPAEDIC SURGERY

## 2020-03-16 ENCOUNTER — HOSPITAL ENCOUNTER (OUTPATIENT)
Dept: GENERAL RADIOLOGY | Facility: HOSPITAL | Age: 80
Discharge: HOME | End: 2020-03-16
Attending: ORTHOPAEDIC SURGERY

## 2020-03-18 ENCOUNTER — OFFICE VISIT CONVERTED (OUTPATIENT)
Dept: ORTHOPEDIC SURGERY | Facility: CLINIC | Age: 80
End: 2020-03-18
Attending: ORTHOPAEDIC SURGERY

## 2020-03-26 ENCOUNTER — TELEMEDICINE CONVERTED (OUTPATIENT)
Dept: FAMILY MEDICINE CLINIC | Facility: CLINIC | Age: 80
End: 2020-03-26
Attending: FAMILY MEDICINE

## 2020-04-29 ENCOUNTER — CONVERSION ENCOUNTER (OUTPATIENT)
Dept: ORTHOPEDIC SURGERY | Facility: CLINIC | Age: 80
End: 2020-04-29

## 2020-04-29 ENCOUNTER — OFFICE VISIT CONVERTED (OUTPATIENT)
Dept: ORTHOPEDIC SURGERY | Facility: CLINIC | Age: 80
End: 2020-04-29
Attending: ORTHOPAEDIC SURGERY

## 2020-06-25 ENCOUNTER — OFFICE VISIT CONVERTED (OUTPATIENT)
Dept: FAMILY MEDICINE CLINIC | Facility: CLINIC | Age: 80
End: 2020-06-25
Attending: FAMILY MEDICINE

## 2020-06-25 ENCOUNTER — HOSPITAL ENCOUNTER (OUTPATIENT)
Dept: FAMILY MEDICINE CLINIC | Facility: CLINIC | Age: 80
Discharge: HOME | End: 2020-06-25
Attending: FAMILY MEDICINE

## 2020-06-25 LAB
ALBUMIN SERPL-MCNC: 4.1 G/DL (ref 3.5–5)
ALBUMIN/GLOB SERPL: 1.3 {RATIO} (ref 1.4–2.6)
ALP SERPL-CCNC: 79 U/L (ref 43–160)
ALT SERPL-CCNC: 14 U/L (ref 10–40)
ANION GAP SERPL CALC-SCNC: 15 MMOL/L (ref 8–19)
AST SERPL-CCNC: 19 U/L (ref 15–50)
BILIRUB SERPL-MCNC: 0.22 MG/DL (ref 0.2–1.3)
BUN SERPL-MCNC: 22 MG/DL (ref 5–25)
BUN/CREAT SERPL: 20 {RATIO} (ref 6–20)
CALCIUM SERPL-MCNC: 9.7 MG/DL (ref 8.7–10.4)
CHLORIDE SERPL-SCNC: 99 MMOL/L (ref 99–111)
CONV CO2: 25 MMOL/L (ref 22–32)
CONV TOTAL PROTEIN: 7.2 G/DL (ref 6.3–8.2)
CREAT UR-MCNC: 1.1 MG/DL (ref 0.5–0.9)
EST. AVERAGE GLUCOSE BLD GHB EST-MCNC: 203 MG/DL
GFR SERPLBLD BASED ON 1.73 SQ M-ARVRAT: 48 ML/MIN/{1.73_M2}
GLOBULIN UR ELPH-MCNC: 3.1 G/DL (ref 2–3.5)
GLUCOSE SERPL-MCNC: 244 MG/DL (ref 65–99)
HBA1C MFR BLD: 8.7 % (ref 3.5–5.7)
OSMOLALITY SERPL CALC.SUM OF ELEC: 289 MOSM/KG (ref 273–304)
POTASSIUM SERPL-SCNC: 4.6 MMOL/L (ref 3.5–5.3)
SODIUM SERPL-SCNC: 134 MMOL/L (ref 135–147)

## 2020-06-28 LAB
AMOXICILLIN+CLAV SUSC ISLT: <=2
AMOXICILLIN+CLAV SUSC ISLT: <=2
AMPICILLIN SUSC ISLT: 16
AMPICILLIN SUSC ISLT: >=32
AMPICILLIN+SULBAC SUSC ISLT: 4
AMPICILLIN+SULBAC SUSC ISLT: 8
BACTERIA UR CULT: ABNORMAL
CEFAZOLIN SUSC ISLT: <=4
CEFAZOLIN SUSC ISLT: <=4
CEFEPIME SUSC ISLT: <=1
CEFEPIME SUSC ISLT: <=1
CEFTAZIDIME SUSC ISLT: <=1
CEFTAZIDIME SUSC ISLT: <=1
CEFTRIAXONE SUSC ISLT: <=1
CEFTRIAXONE SUSC ISLT: <=1
CEFUROXIME ORAL SUSC ISLT: 4
CEFUROXIME ORAL SUSC ISLT: <=1
CEFUROXIME PARENTER SUSC ISLT: 4
CEFUROXIME PARENTER SUSC ISLT: <=1
CIPROFLOXACIN SUSC ISLT: <=0.25
CIPROFLOXACIN SUSC ISLT: <=0.25
ERTAPENEM SUSC ISLT: <=0.5
ERTAPENEM SUSC ISLT: <=0.5
GENTAMICIN SUSC ISLT: <=1
GENTAMICIN SUSC ISLT: <=1
LEVOFLOXACIN SUSC ISLT: <=0.12
LEVOFLOXACIN SUSC ISLT: <=0.12
NITROFURANTOIN SUSC ISLT: <=16
NITROFURANTOIN SUSC ISLT: <=16
TETRACYCLINE SUSC ISLT: <=1
TETRACYCLINE SUSC ISLT: <=1
TMP SMX SUSC ISLT: <=20
TMP SMX SUSC ISLT: <=20
TOBRAMYCIN SUSC ISLT: <=1
TOBRAMYCIN SUSC ISLT: <=1

## 2020-07-23 ENCOUNTER — HOSPITAL ENCOUNTER (OUTPATIENT)
Dept: URGENT CARE | Facility: CLINIC | Age: 80
Discharge: HOME | End: 2020-07-23
Attending: EMERGENCY MEDICINE

## 2020-07-27 ENCOUNTER — OFFICE VISIT CONVERTED (OUTPATIENT)
Dept: FAMILY MEDICINE CLINIC | Facility: CLINIC | Age: 80
End: 2020-07-27
Attending: FAMILY MEDICINE

## 2020-08-10 ENCOUNTER — TELEPHONE CONVERTED (OUTPATIENT)
Dept: FAMILY MEDICINE CLINIC | Facility: CLINIC | Age: 80
End: 2020-08-10
Attending: FAMILY MEDICINE

## 2020-08-10 ENCOUNTER — OFFICE VISIT CONVERTED (OUTPATIENT)
Dept: ORTHOPEDIC SURGERY | Facility: CLINIC | Age: 80
End: 2020-08-10
Attending: ORTHOPAEDIC SURGERY

## 2020-09-08 ENCOUNTER — OFFICE VISIT CONVERTED (OUTPATIENT)
Dept: FAMILY MEDICINE CLINIC | Facility: CLINIC | Age: 80
End: 2020-09-08
Attending: FAMILY MEDICINE

## 2020-09-08 ENCOUNTER — CONVERSION ENCOUNTER (OUTPATIENT)
Dept: FAMILY MEDICINE CLINIC | Facility: CLINIC | Age: 80
End: 2020-09-08

## 2020-11-16 ENCOUNTER — OFFICE VISIT CONVERTED (OUTPATIENT)
Dept: ORTHOPEDIC SURGERY | Facility: CLINIC | Age: 80
End: 2020-11-16
Attending: PHYSICIAN ASSISTANT

## 2021-01-18 ENCOUNTER — OFFICE VISIT CONVERTED (OUTPATIENT)
Dept: ORTHOPEDIC SURGERY | Facility: CLINIC | Age: 81
End: 2021-01-18
Attending: ORTHOPAEDIC SURGERY

## 2021-03-14 ENCOUNTER — HOSPITAL ENCOUNTER (OUTPATIENT)
Dept: URGENT CARE | Facility: CLINIC | Age: 81
Discharge: HOME | End: 2021-03-14
Attending: NURSE PRACTITIONER

## 2021-03-15 LAB — SARS-COV-2 RNA SPEC QL NAA+PROBE: NOT DETECTED

## 2021-03-17 LAB
AMPICILLIN SUSC ISLT: >=32
AMPICILLIN+SULBAC SUSC ISLT: 8
BACTERIA UR CULT: ABNORMAL
CEFAZOLIN SUSC ISLT: <=4
CEFEPIME SUSC ISLT: <=0.12
CEFTAZIDIME SUSC ISLT: <=1
CEFTRIAXONE SUSC ISLT: <=0.25
CIPROFLOXACIN SUSC ISLT: <=0.25
ERTAPENEM SUSC ISLT: <=0.12
GENTAMICIN SUSC ISLT: <=1
LEVOFLOXACIN SUSC ISLT: <=0.12
NITROFURANTOIN SUSC ISLT: 32
PIP+TAZO SUSC ISLT: <=4
TMP SMX SUSC ISLT: <=20
TOBRAMYCIN SUSC ISLT: <=1

## 2021-03-19 ENCOUNTER — OFFICE VISIT CONVERTED (OUTPATIENT)
Dept: FAMILY MEDICINE CLINIC | Facility: CLINIC | Age: 81
End: 2021-03-19
Attending: FAMILY MEDICINE

## 2021-03-19 ENCOUNTER — CONVERSION ENCOUNTER (OUTPATIENT)
Dept: FAMILY MEDICINE CLINIC | Facility: CLINIC | Age: 81
End: 2021-03-19

## 2021-03-19 LAB
BILIRUB UR QL STRIP: NEGATIVE
COLOR UR: YELLOW
CONV CLARITY OF URINE: CLEAR
CONV PROTEIN IN URINE BY AUTOMATED TEST STRIP: NEGATIVE
CONV UROBILINOGEN IN URINE BY AUTOMATED TEST STRIP: NORMAL
GLUCOSE UR QL: NEGATIVE
HGB UR QL STRIP: NEGATIVE
KETONES UR QL STRIP: NEGATIVE
LEUKOCYTE ESTERASE UR QL STRIP: NEGATIVE
NITRITE UR QL STRIP: NEGATIVE
PH UR STRIP.AUTO: 5.5 [PH]
SP GR UR: 1.02

## 2021-04-02 ENCOUNTER — OFFICE VISIT CONVERTED (OUTPATIENT)
Dept: FAMILY MEDICINE CLINIC | Facility: CLINIC | Age: 81
End: 2021-04-02
Attending: FAMILY MEDICINE

## 2021-04-02 ENCOUNTER — HOSPITAL ENCOUNTER (OUTPATIENT)
Dept: FAMILY MEDICINE CLINIC | Facility: CLINIC | Age: 81
Discharge: HOME | End: 2021-04-02
Attending: FAMILY MEDICINE

## 2021-04-02 ENCOUNTER — CONVERSION ENCOUNTER (OUTPATIENT)
Dept: FAMILY MEDICINE CLINIC | Facility: CLINIC | Age: 81
End: 2021-04-02

## 2021-04-02 LAB
25(OH)D3 SERPL-MCNC: 32.1 NG/ML (ref 30–100)
ALBUMIN SERPL-MCNC: 4.5 G/DL (ref 3.5–5)
ALBUMIN/GLOB SERPL: 1.3 {RATIO} (ref 1.4–2.6)
ALP SERPL-CCNC: 77 U/L (ref 43–160)
ALT SERPL-CCNC: 19 U/L (ref 10–40)
ANION GAP SERPL CALC-SCNC: 15 MMOL/L (ref 8–19)
AST SERPL-CCNC: 20 U/L (ref 15–50)
BILIRUB SERPL-MCNC: 0.29 MG/DL (ref 0.2–1.3)
BILIRUB UR QL STRIP: NEGATIVE
BUN SERPL-MCNC: 20 MG/DL (ref 5–25)
BUN/CREAT SERPL: 19 {RATIO} (ref 6–20)
CALCIUM SERPL-MCNC: 9.6 MG/DL (ref 8.7–10.4)
CHLORIDE SERPL-SCNC: 102 MMOL/L (ref 99–111)
CHOLEST SERPL-MCNC: 252 MG/DL (ref 107–200)
CHOLEST/HDLC SERPL: 5.5 {RATIO} (ref 3–6)
COLOR UR: YELLOW
CONV CLARITY OF URINE: NORMAL
CONV CO2: 26 MMOL/L (ref 22–32)
CONV PROTEIN IN URINE BY AUTOMATED TEST STRIP: NEGATIVE
CONV TOTAL PROTEIN: 8 G/DL (ref 6.3–8.2)
CONV UROBILINOGEN IN URINE BY AUTOMATED TEST STRIP: NORMAL
CREAT UR-MCNC: 1.04 MG/DL (ref 0.5–0.9)
EST. AVERAGE GLUCOSE BLD GHB EST-MCNC: 217 MG/DL
GFR SERPLBLD BASED ON 1.73 SQ M-ARVRAT: 51 ML/MIN/{1.73_M2}
GLOBULIN UR ELPH-MCNC: 3.5 G/DL (ref 2–3.5)
GLUCOSE SERPL-MCNC: 174 MG/DL (ref 65–99)
GLUCOSE UR QL: NORMAL
HBA1C MFR BLD: 9.2 % (ref 3.5–5.7)
HDLC SERPL-MCNC: 46 MG/DL (ref 40–60)
HGB UR QL STRIP: NORMAL
KETONES UR QL STRIP: NEGATIVE
LDLC SERPL CALC-MCNC: 168 MG/DL (ref 70–100)
LEUKOCYTE ESTERASE UR QL STRIP: NORMAL
NITRITE UR QL STRIP: POSITIVE
OSMOLALITY SERPL CALC.SUM OF ELEC: 295 MOSM/KG (ref 273–304)
PH UR STRIP.AUTO: 5.5 [PH]
POTASSIUM SERPL-SCNC: 4.2 MMOL/L (ref 3.5–5.3)
SODIUM SERPL-SCNC: 139 MMOL/L (ref 135–147)
SP GR UR: NORMAL
T4 FREE SERPL-MCNC: 1.4 NG/DL (ref 0.9–1.8)
TRIGL SERPL-MCNC: 190 MG/DL (ref 40–150)
TSH SERPL-ACNC: 2.46 M[IU]/L (ref 0.27–4.2)
VIT B12 SERPL-MCNC: 299 PG/ML (ref 211–911)
VLDLC SERPL-MCNC: 38 MG/DL (ref 5–37)

## 2021-04-05 LAB
AMPICILLIN SUSC ISLT: >=32
AMPICILLIN+SULBAC SUSC ISLT: 8
BACTERIA UR CULT: ABNORMAL
CEFAZOLIN SUSC ISLT: <=4
CEFEPIME SUSC ISLT: <=0.12
CEFTAZIDIME SUSC ISLT: <=1
CEFTRIAXONE SUSC ISLT: <=0.25
CIPROFLOXACIN SUSC ISLT: <=0.25
ERTAPENEM SUSC ISLT: <=0.12
GENTAMICIN SUSC ISLT: <=1
LEVOFLOXACIN SUSC ISLT: <=0.12
NITROFURANTOIN SUSC ISLT: <=16
PIP+TAZO SUSC ISLT: <=4
TMP SMX SUSC ISLT: <=20
TOBRAMYCIN SUSC ISLT: <=1
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
## 2021-05-12 NOTE — PROGRESS NOTES
"   Progress Note      Patient Name: Sabi Haynes   Patient ID: [id number]   Sex: Female   YOB: 1940    Primary Care Provider: Kyle Lloyd DO   Referring Provider: Drea Villalpando MD    Visit Date: April 29, 2020    Provider: Henri Quintero MD   Location: [facility]   Location Address: [address] [place] [address]  [id number]   Location Phone: [phone]          Chief Complaint  · Right Shoulder Pain      History Of Present Illness  Sabi Haynes is a 79 year old /White female who presents today to [facility]. She is here for right shoulder pain that started a few months ago. Patient states right shoulder pain with radiate into right upper arm.       Past Medical History  Aftercare following Right knee arthroscopic partial medial meniscectomy; Aftercare following right medial meniscectomy; Allergy; Arthritis; Bladder Disorder; Diabetes; Diabetes mellitus; Heart Disease; Hemorrhoids; High cholesterol; Hyperlipemia; Kidney Disease; Kidney Stone; Left hand pain; Limb Swelling; Memory loss/Forgetfulness; Migraine Headaches; Primary osteoarthritis of right knee; Reflux; Retinal Detachment, Recent, Total; Right Knee Medial Meniscus Tear; Ringing in ears; Sciatica, right; Seasonal allergies; SOB (shortness of breath); Stomach Disorder; Thyroid disorder; Thyroid Problems; Ulcer         Past Surgical History  Artificial Joints/Limbs; Bladder Surg.; Carpal Tunnel Release; Cholecystectomy; Cyst Removal; Eye Implant; Gallbladder; Hip Replacement; Hysterectomy; Joint Surgery; Kidney; Knee surgery; Varicose Vein Ablation         Medication List  Alcohol Pads topical pads, medicated; alcohol swabs topical pads, medicated; ezetimibe 10 mg oral tablet; Lantus Solostar U-100 Insulin 100 unit/mL (3 mL) subcutaneous insulin pen; levothyroxine 112 mcg oral tablet; Pen Needle 31 gauge x 3/16\" miscellaneous needle; Precision Xtra Test miscellaneous strip; Premarin 0.625 mg/gram " "vaginal cream; Prilosec 40 mg; Voltaren 1 % topical gel         Allergy List  Actos; aspirin; Bydureon; Byetta; Celebrex; Cipro; Crestor; Darvocet-N 100; Demerol; digoxin; Dilaudid; glipizide; Januvia; Latex Exam Gloves; Levemir FlexTouch; Lipitor; metformin; morphine; Motrin; rifampin         Family Medical History  Stroke; Heart Disease; Cancer, Unspecified; Diabetes, unspecified type; Leukemia; Family history of certain chronic disabling diseases; arthritis; Osteoporosis         Reproductive History   4 Para 4 0 0 0 & Postmenopausal       Social History  Alcohol Use (Never); lives alone; Recreational Drug Use (Never); Retired.; Tobacco (Never);          Review of Systems  · Constitutional  o Denies  o : fever, chills, weight loss  · Cardiovascular  o Denies  o : chest pain, shortness of breath  · Gastrointestinal  o Denies  o : liver disease, heartburn, nausea, blood in stools  · Genitourinary  o Denies  o : painful urination, blood in urine  · Integument  o Denies  o : rash, itching  · Neurologic  o Denies  o : headache, weakness, loss of consciousness  · Musculoskeletal  o Denies  o : painful, swollen joints  · Psychiatric  o Denies  o : drug/alcohol addiction, anxiety, depression      Vitals  Date Time BP Position Site L\R Cuff Size HR RR TEMP (F) WT  HT  BMI kg/m2 BSA m2 O2 Sat        2020 10:46 AM      74 - R   184lbs 16oz 5'  6\" 29.86 1.98 96 %          Physical Examination  · Constitutional  o Appearance  o : well developed, well-nourished, no obvious deformities present  · Head and Face  o Head  o :   § Inspection  § : normocephalic  o Face  o :   § Inspection  § : no facial lesions  · Eyes  o Conjunctivae  o : conjunctivae normal  o Sclerae  o : sclerae white  · Ears, Nose, Mouth and Throat  o Ears  o :   § External Ears  § : appearance within normal limits  § Hearing  § : intact  o Nose  o :   § External Nose  § : appearance normal  · Neck  o Inspection/Palpation  o : normal " appearance  o Range of Motion  o : full range of motion  · Respiratory  o Respiratory Effort  o : breathing unlabored  o Inspection of Chest  o : normal appearance  o Auscultation of Lungs  o : no audible wheezing or rales  · Cardiovascular  o Heart  o : regular rate  · Gastrointestinal  o Abdominal Examination  o : soft and non-tender  · Skin and Subcutaneous Tissue  o General Inspection  o : intact, no rashes  · Psychiatric  o General  o : Alert and oriented x3  o Judgement and Insight  o : judgment and insight intact  o Mood and Affect  o : mood normal, affect appropriate  · Right Shoulder  o Inspection  o : No skin discoloration, or swelling. Near-full passive shoulder range of motion. Tender AC joint. Positive empty can. Positive impingement signs. Good tone of deltoid, biceps, triceps, wrist extensors, and wrist flexors. Neurovascularly intact. Sensation grossly intact.  · Imaging  o Imaging  o : MRI right shoulder: 1) Rotator cuff tendinosis with mildly retracted full-thickness tear of the supraspinatus tendon at the footplate. There is partial articular sided tearing of the subscapularis and infraspinatus tendons as well as mild interstitial tearing of the proximal portions of the infraspinatus tendon. 2) Mild to moderate acromioclavicular and glenohumeral osteoarthritis. 3) Mild pain labral degenerative tearing with no evidence of a focal displaced tear.           Assessment  · Right shoulder pain, unspecified chronicity     719.41/M25.511  · Right rotator cuff tear     840.4/M75.100      Plan  · Medications  o Medications have been Reconciled  o Transition of Care or Provider Policy  · Instructions  o Reviewed the patient's Past Medical, Social, and Family history as well as the ROS at today's visit, no changes.  o Call or return if worsening symptoms.  o This note is transcribed by Clarissa varela  o The injection helped her quite a bit. She still feels like she is doing okay with injections. We are going  to do this conservatively. She understands her diagnosis. She is going to follow back up with me if she wants further treatment.             Electronically Signed by: Clarissa Davalos, -Author on April 30, 2020 03:36:54 PM  Electronically Co-signed by: Henri Quintero MD -Reviewer on April 30, 2020 05:50:25 PM

## 2021-05-13 NOTE — PROGRESS NOTES
Progress Note      Patient Name: Sabi Haynes   Patient ID: [id number]   Sex: Female   YOB: 1940    Primary Care Provider: Kyle Lloyd DO   Referring Provider: Drea Villalpando MD    Visit Date: September 8, 2020    Provider: Drea Villalpando MD   Location: [facility]   Location Address: [address] [place] [address], [address] [place] [address]  [id number]   Location Phone: [phone]          Chief Complaint  · Annual Wellness Exam      History Of Present Illness  Sabi Haynes is a 79 year old /White female who presents for evaluation and treatment of:   The patient is a 79 year old /White female who has come to this office for her Annual Wellness Visit.   Her Primary Care Provider is Drea Villalpando MD. Her comprehensive Care Team list, including suppliers, has been updated on the Facesheet. Her medical/family history, height, weight, BMI, and blood pressure have been reviewed and are in the chart. The Health Risk Assessment has been completed and scanned in the chart.   Medications are listed in the medication list.   The active problem list includes: Aftercare following Right knee arthroscopic partial medial meniscectomy, Aftercare following right medial meniscectomy, Allergy, Arthritis, Diabetes mellitus, Hemorrhoids, High cholesterol, Kidney stone, Left hand pain, Memory loss/Forgetfulness, Migraine Headaches, Primary osteoarthritis of right knee, Reflux, Right Knee Medial Meniscus Tear, Ringing in ears, Sciatica, right, SOB (shortness of breath), and Thyroid Problems   The patient does not have a history of substance use.   Patient reports her diet is adequate.   The Mini-Cog has been administered and is scanned in chart. The results are negative. Her cognitive function is limited, including forgetfulness, with the severity being mild.   A hearing loss screen was completed today and the result is positive, indicating a need for further evaluation.    Patient does not have any risk factors for depression. Patient completed the PHQ-9 today and it has been scanned in the chart. The total score is 1-4.   The Timed Up and Go screen was administered today and the result is negative.   The Mcrae Index of [place] in ADLs indicated full function (score of 6).   A Falls Risk Assessment has been completed, including a review of home fall hazards and medication review.   Overall, the patient's functional ability is noted by this provider to be within normal limits. Her level of safety is noted to be within normal limits. Her balance/gait is within normal limits. There have been no falls in the past year. Patient-specific home safety recommendations have been reviewed and a copy has been given to patient.   She denies issues with leaking urine.   There are no additional risk factors identified.   Living Will/Advanced Directive previously executed but not in chart.   Personalized health advice was given to the patient and a written health screening schedule was established; see Plan for details.      Pt reports her sister recently .      Pt reports she has to cath herself to urinate and she also has a history of right side pain and previous kidney stones.       Past Medical History  Aftercare following Right knee arthroscopic partial medial meniscectomy; Aftercare following right medial meniscectomy; Allergy; Arthritis; Bladder Disorder; Diabetes; Diabetes mellitus; Heart Attack; Heart Disease; Hemorrhoids; High cholesterol; Hyperlipemia; Hypertension; Kidney Disease; Kidney stone; Left hand pain; Limb Swelling; Memory loss/Forgetfulness; Migraine Headaches; Primary osteoarthritis of right knee; Reflux; Retinal Detachment, Recent, Total; Right Knee Medial Meniscus Tear; Ringing in ears; Sciatica, right; Seasonal allergies; SOB (shortness of breath); Stomach Disorder; Stroke; Thyroid disorder; Thyroid Problems; Ulcer         Past Surgical History  Artificial  "Joints/Limbs; Bladder Surg.; Carpal Tunnel Release; Cholecystectomy; Colonoscopy; Cyst Removal; Eye Implant; Gallbladder; Hip Replacement; Hysterectomy; Joint Surgery; Kidney; Knee surgery; Varicose Vein Ablation         Medication List  Alcohol Pads topical pads, medicated; alcohol swabs topical pads, medicated; FreeStyle Lite Meter miscellaneous kit; FreeStyle Lite Strips miscellaneous strip; Glucose Gel 40 % oral gel; Lantus Solostar U-100 Insulin 100 unit/mL (3 mL) subcutaneous insulin pen; levothyroxine 112 mcg oral tablet; Meter-Check miscellaneous solution; Pen Needle 31 gauge x 3/16\" miscellaneous needle; Precision Xtra Test miscellaneous strip; Prilosec 40 mg         Allergy List  NO KNOWN DRUG ALLERGIES; Actos; aspirin; Bydureon; Byetta; Celebrex; Cipro; Crestor; Darvocet-N 100; Demerol; digoxin; Dilaudid; glipizide; Januvia; Latex Exam Gloves; Levemir FlexTouch; Lipitor; metformin; morphine; Motrin; rifampin         Family Medical History  Stroke; Heart Disease; Cancer, Unspecified; Diabetes, unspecified type; Leukemia; Family history of certain chronic disabling diseases; arthritis; Osteoporosis         Reproductive History   4 Para 4 0 0 0 & Postmenopausal       Social History  Alcohol Use (Never); lives alone; Recreational Drug Use (Never); Retired.; Tobacco (Never);          Immunizations  Name Date Admin   Influenza Refused   [id number] Refused   Prevnar 13 Refused   Tdap 2017         Review of Systems  · Constitutional  o Denies  o : fatigue, night sweats  · Eyes  o Denies  o : double vision, blurred vision  · HENT  o Denies  o : vertigo, recent head injury  · Breasts  o Denies  o : abnormal changes in breast size, additional breast symptoms except as noted in the HPI  · Cardiovascular  o Denies  o : chest pain, irregular heart beats  · Respiratory  o Denies  o : shortness of breath, productive cough  · Gastrointestinal  o Denies  o : nausea, " "vomiting  · Genitourinary  o Denies  o : dysuria, urinary retention  · Integument  o Denies  o : hair growth change, new skin lesions  · Neurologic  o Denies  o : altered mental status, seizures  · Musculoskeletal  o Denies  o : joint swelling, limitation of motion  · Endocrine  o Denies  o : cold intolerance, heat intolerance  · Heme-Lymph  o Denies  o : petechiae, lymph node enlargement or tenderness  · Allergic-Immunologic  o Denies  o : frequent illnesses      Vitals  Date Time BP Position Site L\R Cuff Size HR RR TEMP (F) WT  HT  BMI kg/m2 BSA m2 O2 Sat FR L/min FiO2        09/08/2020 03:28 /78 Sitting    85 - R  97.5 184lbs 2oz 5'  6\" 29.72 1.97 98 %            Physical Examination  · Eyes  o Conjunctivae  o : conjunctivae normal  o Sclerae  o : sclerae white  o Pupils and Irises  o : pupils equal, round, and reactive to light and accommodation bilaterally  o Eyelids/Ocular Adnexae  o : eyelid appearance normal, no exudates present  · Ears, Nose, Mouth and Throat  o Ears  o :   § External Ears  § : external auditory canal appearance within normal limits, no discharge present  § Otoscopic Examination  § : tympanic membrane appearance within normal limits bilaterally  o Nose  o :   § External Nose  § : appearance normal  § Nasopharynx  § : no discharge present  o Oral Cavity  o :   § Oral Mucosa  § : oral mucosa normal  § Lips  § : lip appearance normal  o Throat  o :   § Oropharynx  § : no inflammation or lesions present, tonsils within normal limits  · Neck  o Inspection/Palpation  o : normal appearance, no masses or tenderness, trachea midline  o Range of Motion  o : cervical range of motion within normal limits  o Thyroid  o : gland size normal, nontender, no nodules or masses present on palpation  o Jugular Veins  o : JVP normal  · Respiratory  o Respiratory Effort  o : breathing unlabored  o Inspection of Chest  o : normal appearance  o Auscultation of Lungs  o : normal breath sounds " throughout  · Cardiovascular  o Heart  o :   § Auscultation of Heart  § : regular rate and rhythm, no murmurs, gallops or rubs  o Peripheral Vascular System  o :   § Extremities  § : no edema  · Gastrointestinal  o Abdominal Examination  o : abdomen nontender to palpation, tone normal without rigidity or guarding, no masses present, bowel sounds present in all four quadrants  o Liver and spleen  o : no hepatomegaly present, liver nontender to palpation, spleen not palpable  o Hernias  o : no hernias present  · Lymphatic  o Neck  o : no lymphadenopathy present  · Musculoskeletal  o Spine  o :   § Inspection/Palpation  § : no spinal tenderness, scoliosis or kyphosis present  § Stability  § : no subluxations present  § Range of Motion  § : spine range of motion normal  o Right Upper Extremity  o :   § Inspection/Palpation  § : no tenderness to palpation, ROM normal  o Left Upper Extremity  o :   § Inspection/Palpation  § : no tenderness to palpation, ROM normal  o Right Lower Extremity  o :   § Inspection/Palpation  § : no joint or limb tenderness to palpation, ROM normal  o Left Lower Extremity  o :   § Inspection/Palpation  § : no joint or limb tenderness to palpation, ROM normal  · Skin and Subcutaneous Tissue  o General Inspection  o : no rashes or lesions present, no areas of discoloration  o Body Hair  o : scalp palpation normal, hair normal for age, general body hair distribution normal for age  o Digits and Nails  o : no clubbing, cyanosis, deformities or edema present, normal appearing nails  · Neurologic  o Mental Status Examination  o :   § Orientation  § : grossly oriented to person, place and time  o Gait and Station  o : normal gait, able to stand without difficulty  · Psychiatric  o Judgement and Insight  o : judgment and insight intact  o Mood and Affect  o : mood normal, affect appropriate          Assessment  · Diabetes mellitus, type 2     250.00/E11.9  · Screening for  depression     V79.0/Z13.89  · Encounter for Medicare annual wellness exam     V70.0/Z00.00      Plan  · Orders  o Falls Risk Assessment Completed (3288F) - V70.0/Z00.00 - 09/08/2020  o Brief hearing screening (written) [facility] () - V70.0/Z00.00 - 09/08/2020  o Presence or absence of urinary incontinence assessed (SHIRLEY) (1090F) - V70.0/Z00.00 - 09/08/2020  o ACO-39: Current medications updated and reviewed () - - 09/08/2020  o ACO-18: Negative screen for clinical depression using a standardized tool () - V79.0/Z13.89 - 09/08/2020  o ACO-14: Influenza immunization was not administered for reasons documented () - - 09/08/2020   patient declines  o ACO-13: Fall Risk Screening with no falls in past year or only one fall without injury in the past year (1101F) - - 09/08/2020  o ACO - Pt declines to or was not able to provide an Advance Care Plan or name a Surrogate Decision Maker (1124F) - - 09/08/2020  · Medications  o Glucose Gel 40 % oral gel   SIG: take 1 tube by oral route as directed for 30 days   DISP: (5) 38 gm tube with 1 refills  Adjusted on 09/08/2020     o Medications have been Reconciled  o Transition of Care or Provider Policy  · Instructions  o Depression Screen completed and scanned into the EMR under the designated folder within the patient's documents.  o Today's PHQ-9 result is _4__  o Health Risk Assessment has been reviewed with the patient.  o Written health screening schedule for next 5-10 years was established with patient; information scanned in chart and given/mailed to patient.  o Fall prevention methods discussed and a copy of recommendations given/mailed to patient.  o Patient was educated/instructed on their diagnosis, treatment and medications prior to discharge from the clinic today.  o Minutes spent with patient including greater than 50% in Education/Counseling/Care Coordination.  o Time spent with the patient was minutes, more than 50% face to face. 45  minutes  · Disposition  o Return Visit Request in/on 3 months +/- 0 days (02380).            Electronically Signed by: Drea Villalpando MD -Author on October 26, 2020 06:46:33 AM

## 2021-05-13 NOTE — PROGRESS NOTES
Progress Note      Patient Name: Sabi Haynes   Patient ID: [id number]   Sex: Female   YOB: 1940    Primary Care Provider: Kyle Lloyd DO   Referring Provider: Drea Villalpando MD    Visit Date: November 16, 2020    Provider: Yina Fisher PA-C   Location: [facility]   Location Address: [address] [place] [address]  [id number]   Location Phone: [phone]          Chief Complaint  · Follow up right shoulder pain      History Of Present Illness  Sabi Haynes is a 80 year old /White female who presents today to [facility].      She complains of right shoulder pain that has been progressive over the past 14 months since she had a bladder procedure. MRI did show RC tendinopathy and RC tear. She had an injection about 8 months ago. It gave minimal relief, however she is very apprehensive to proceed with a surgery due to COVID. She has pain with overhead and rotational motions. She has difficulty dressing/undressing.       Past Medical History  Aftercare following Right knee arthroscopic partial medial meniscectomy; Aftercare following right medial meniscectomy; Allergy; Arthritis; Bladder Disorder; Diabetes; Diabetes mellitus; Heart Attack; Heart Disease; Hemorrhoids; High cholesterol; Hyperlipemia; Hypertension; Kidney Disease; Kidney stone; Left hand pain; Limb Swelling; Memory loss/Forgetfulness; Migraine Headaches; Primary osteoarthritis of right knee; Reflux; Retinal Detachment, Recent, Total; Right Knee Medial Meniscus Tear; Ringing in ears; Sciatica, right; Seasonal allergies; SOB (shortness of breath); Stomach Disorder; Stroke; Thyroid disorder; Thyroid Problems; Ulcer         Past Surgical History  Artificial Joints/Limbs; Bladder Surg.; Carpal Tunnel Release; Cholecystectomy; Colonoscopy; Cyst Removal; Eye Implant; Gallbladder; Hip Replacement; Hysterectomy; Joint Surgery; Kidney; Knee surgery; Varicose Vein Ablation         Medication  "List  Alcohol Pads topical pads, medicated; alcohol swabs topical pads, medicated; FreeStyle Lite Meter miscellaneous kit; FreeStyle Lite Strips miscellaneous strip; Glucose Gel 40 % oral gel; Lantus Solostar U-100 Insulin 100 unit/mL (3 mL) subcutaneous insulin pen; levothyroxine 112 mcg oral tablet; Meter-Check miscellaneous solution; Pen Needle 31 gauge x 3/16\" miscellaneous needle; Precision Xtra Test miscellaneous strip; Prilosec 40 mg         Allergy List  NO KNOWN DRUG ALLERGIES; Actos; aspirin; Bydureon; Byetta; Celebrex; Cipro; Crestor; Darvocet-N 100; Demerol; digoxin; Dilaudid; glipizide; Januvia; Latex Exam Gloves; Levemir FlexTouch; Lipitor; metformin; morphine; Motrin; rifampin       Allergies Reconciled  Family Medical History  Stroke; Heart Disease; Cancer, Unspecified; Diabetes, unspecified type; Leukemia; Family history of certain chronic disabling diseases; arthritis; Osteoporosis         Reproductive History   4 Para 4 0 0 0 & Postmenopausal       Social History  Alcohol Use (Never); lives alone; Recreational Drug Use (Never); Retired.; Tobacco (Never);          Review of Systems  · Constitutional  o Denies  o : fever, chills, weight loss  · Cardiovascular  o Denies  o : chest pain, shortness of breath  · Gastrointestinal  o Denies  o : liver disease, heartburn, nausea, blood in stools  · Genitourinary  o Denies  o : painful urination, blood in urine  · Integument  o Denies  o : rash, itching  · Neurologic  o Denies  o : headache, weakness, loss of consciousness  · Musculoskeletal  o Admits  o : painful, swollen joints  · Psychiatric  o Denies  o : drug/alcohol addiction, anxiety, depression      Vitals  Date Time BP Position Site L\R Cuff Size HR RR TEMP (F) WT  HT  BMI kg/m2 BSA m2 O2 Sat FR L/min FiO2        2020 10:38 AM      84 - R   184lbs 0oz 5'  2\" 33.65 1.91 97 %            Physical Examination  · Constitutional  o Appearance  o : well developed, well-nourished, no " obvious deformities present  · Head and Face  o Head  o :   § Inspection  § : normocephalic  o Face  o :   § Inspection  § : no facial lesions  · Eyes  o Conjunctivae  o : conjunctivae normal  o Sclerae  o : sclerae white  · Ears, Nose, Mouth and Throat  o Ears  o :   § External Ears  § : appearance within normal limits  § Hearing  § : intact  o Nose  o :   § External Nose  § : appearance normal  · Neck  o Inspection/Palpation  o : normal appearance  o Range of Motion  o : full range of motion  · Respiratory  o Respiratory Effort  o : breathing unlabored  o Inspection of Chest  o : normal appearance  o Auscultation of Lungs  o : no audible wheezing or rales  · Cardiovascular  o Heart  o : regular rate  · Gastrointestinal  o Abdominal Examination  o : soft and non-tender  · Skin and Subcutaneous Tissue  o General Inspection  o : intact, no rashes  · Psychiatric  o General  o : Alert and oriented x3  o Judgement and Insight  o : judgment and insight intact  o Mood and Affect  o : mood normal, affect appropriate  · Right Shoulder  o Inspection  o : No deformity or discoloration. Tender over lateral aspect. Near full PROM. RC strength is 3/5 with MMT. Tender over AC joint. Pain with impingement testing. Good tone of deltoid, biceps, wrist flexors and extensors. Neurovascularly intact.   · Injection Note/Aspiration Note  o Site  o : right shoulder  o Procedure  o : Procedure: After educating the patient, patient gave consent for procedure. After using Chloraprep, the joint space was injected. The patient tolerated the procedure well.   o Medication  o : 80 mg of DepoMedrol with 9cc of 1% Lidocaine  · In Office Procedures  o View  o : AP/LATERAL  o Site  o : right, shoulder   o Indication  o : Right shoulder pain   o Study  o : X-rays ordered, taken in the office, and reviewed today.  o Xray  o : AC arthrosis, high riding humerus  o Comparative Data  o : Comparative Data found and reviewed today    · Imaging  o Imaging  o : Right shoulder MRI performed on 3/16/2020 revealed 1) Rotator cuff tendinosis with mildly retracted full-thickness tear of the supraspinatus tendon at the footplate. There is partial articular retracted sided tearing of the subscapularis and infraspinatus tendons as well as mild interstitial tearing of the proximal portions of the infraspinatus tendon. 2) Mild to moderate AC and glenohumeral osteoarthritis. 3) Mild pan labral degenerative tearing with no evidence of a focal displaced tear.           Assessment  · AC (acromioclavicular) arthritis     716.91/M19.019  · Pain: Shoulder     719.41/M25.519  · Rotator cuff tear     840.4/M75.100      Plan  · Orders  o Depo-Medrol injection 80mg () - 719.41/M25.519 - 11/16/2020   Lot [id number] exp 09 21 Landy ARREDONDO  o Shoulder Intra-articular Injection without US Guidance [facility] ([id number]) - 719.41/M25.519 - 11/16/2020   Lot 08 [id number] exp 08 21 Hospira Yina ARREDONDO  o Scapula (Right) [facility] ([id number]) - 719.41/M25.519 - 11/16/2020  · Medications  o Medications have been Reconciled  o Transition of Care or Provider Policy  · Instructions  o Reviewed the patient's Past Medical, Social, and Family history as well as the ROS at today's visit, no changes.  o Call or return if worsening symptoms.  o Injection today. Follow up PRN.   o Electronically Identified Patient Education Materials Provided Electronically            Electronically Signed by: ARNULFO Taveras-C -Author on November 16, 2020 12:29:47 PM  Electronically Co-signed by: Henri Quintero MD -Reviewer on November 16, 2020 07:38:52 PM

## 2021-05-13 NOTE — PROGRESS NOTES
Progress Note      Patient Name: Sabi Haynes   Patient ID: [id number]   Sex: Female   YOB: 1940    Primary Care Provider: Kyle Lloyd DO   Referring Provider: Drea Villalpando MD    Visit Date: August 10, 2020    Provider: Drea Villalpando MD   Location: [facility]   Location Address: [address] [place] [address], [address]  [id number]   Location Phone: [phone]          Chief Complaint  · DM type 2      History Of Present Illness  TELEHEALTH TELEPHONE VISIT  Sabi Haynes is a 79 year old /White female who is presenting for evaluation via telehealth telephone visit. Verbal consent obtained before beginning visit.   Provider spent 25 minutes with patient during telehealth visit.   The following staff were present during this visit: Maryann Giang   Past Medical History/Overview of Patient Symptoms     Morning sugars are 105-107 and she reports she is taking the 50 units once a day and she just got a steroid shot in her knee by Dr. Quintero.  I informed her that may cause her blood sugars to increase. Pt says she is always in pain and she may need to have a scope for her shoulder.       Past Medical History  Aftercare following Right knee arthroscopic partial medial meniscectomy; Aftercare following right medial meniscectomy; Allergy; Arthritis; Bladder Disorder; Diabetes; Diabetes mellitus; Heart Attack; Heart Disease; Hemorrhoids; High cholesterol; Hyperlipemia; Hypertension; Kidney Disease; Kidney stone; Left hand pain; Limb Swelling; Memory loss/Forgetfulness; Migraine Headaches; Primary osteoarthritis of right knee; Reflux; Retinal Detachment, Recent, Total; Right Knee Medial Meniscus Tear; Ringing in ears; Sciatica, right; Seasonal allergies; SOB (shortness of breath); Stomach Disorder; Stroke; Thyroid disorder; Thyroid Problems; Ulcer         Past Surgical History  Artificial Joints/Limbs; Bladder Surg.; Carpal Tunnel Release; Cholecystectomy; Colonoscopy; Cyst  "Removal; Eye Implant; Gallbladder; Hip Replacement; Hysterectomy; Joint Surgery; Kidney; Knee surgery; Varicose Vein Ablation         Medication List  Alcohol Pads topical pads, medicated; alcohol swabs topical pads, medicated; FreeStyle Lite Meter miscellaneous kit; FreeStyle Lite Strips miscellaneous strip; GlucaGen HypoKit 1 mg injection recon soln; Lantus Solostar U-100 Insulin 100 unit/mL (3 mL) subcutaneous insulin pen; levothyroxine 112 mcg oral tablet; Novolog Flexpen U-100 Insulin 100 unit/mL (3 mL) subcutaneous insulin pen; Pen Needle 31 gauge x 3/\" miscellaneous needle; Precision Xtra Test miscellaneous strip; Prilosec 40 mg         Allergy List  NO KNOWN DRUG ALLERGIES; Actos; aspirin; Bydureon; Byetta; Celebrex; Cipro; Crestor; Darvocet-N 100; Demerol; digoxin; Dilaudid; glipizide; Januvia; Latex Exam Gloves; Levemir FlexTouch; Lipitor; metformin; morphine; Motrin; rifampin         Family Medical History  Stroke; Heart Disease; Cancer, Unspecified; Diabetes, unspecified type; Leukemia; Family history of certain chronic disabling diseases; arthritis; Osteoporosis         Reproductive History   4 Para 4 0 0 0 & Postmenopausal       Social History  Alcohol Use (Never); lives alone; Recreational Drug Use (Never); Retired.; Tobacco (Never);          Immunizations  Name Date Admin   Influenza Refused   [id number] Refused   Prevnar 13 Refused   Tdap          Review of Systems  · Constitutional  o Denies  o : fatigue, fever, weight loss, weight gain  · Eyes  o Denies  o : eye discomfort, eye pain  · HENT  o Denies  o : vertigo, nasal congestion  · Genitourinary  o Denies  o : frequency, dysuria  · Integument  o Denies  o : rash, itching  · Neurologic  o Denies  o : muscular weakness, memory difficulties  · Musculoskeletal  o Admits  o : shoulder pain, knee pain  o Denies  o : joint swelling, muscle pain  · Psychiatric  o Denies  o : anxiety, depression  · Heme-Lymph  o Denies  o : " "lightheadedness, easy bleeding  · Allergic-Immunologic  o Denies  o : sinus allergy symptoms, allergic dermatitis      Vitals  Date Time BP Position Site L\R Cuff Size HR RR TEMP (F) WT  HT  BMI kg/m2 BSA m2 O2 Sat HC       08/10/2020 01:20 PM      102 - R   180lbs 0oz 5'  6\" 29.05 1.95 96 %              Assessment  · Diabetes mellitus, type 2     250.00/E11.9      Plan  · Orders  o ACO-39: Current medications updated and reviewed () - - 08/10/2020  o Physician Telephone Evaluation, 21-30 minutes ([id number]) - 250.00/E11.9 - 08/10/2020  · Medications  o Medications have been Reconciled  o Transition of Care or Provider Policy  · Instructions  o Plan Of Care:   o Chronic conditions reviewed and taken into consideration for today's treatment plan.  o Discussed Covid-19 precautions including, but not limited to, social distancing, avoid touching your face, and hand washing.   · Disposition  o Return Visit Request in/on 1 month +/- 0 days ([id number]).            Electronically Signed by: Drea Villalpando MD -Author on August 10, 2020 03:27:55 PM  "

## 2021-05-13 NOTE — PROGRESS NOTES
Progress Note      Patient Name: Sabi Haynes   Patient ID: [id number]   Sex: Female   YOB: 1940    Primary Care Provider: Kyle Lloyd DO   Referring Provider: Drea Villalpando MD    Visit Date: June 25, 2020    Provider: Drea Villalpando MD   Location: [facility]   Location Address: [address] [place] [address], [address]  [id number]   Location Phone: [phone]          Chief Complaint  · Diabetes Mellitus Type 2      History Of Present Illness  Sabi Haynes is a 79 year old /White female who presents for evaluation and treatment of:      Pt reports she had a low blood sugar when she had to start to eat a lot of sweets and drinks to get it back up.  She is taking extra insulin twice a day.  pt reports her sugars have been high every since her shoulder surgery 9 months ago.  Pt reports she also has tailbone pain since her procedure and she called back to inform them of her complications and she was told Dr. Encinas is not in the system anymore. Pt was going to a physican in [place].    Pt reports she is not seeing Dr. Peace anymore because of the language barrier.     Diabetes- pt has her blood sugars recorded from 100-300s occasionally.  I am continuing her Lantus and adding Tradjenta since she has chronic kidney disease.  Hopefully she will not need the sliding scale insulin.    UTI- pt reports she has increased urinary frequency and UA was positive for nitrites and leukocytes.       Past Medical History  Aftercare following Right knee arthroscopic partial medial meniscectomy; Aftercare following right medial meniscectomy; Allergy; Arthritis; Bladder Disorder; Diabetes; Diabetes mellitus; Heart Disease; Hemorrhoids; High cholesterol; Hyperlipemia; Kidney Disease; Kidney stone; Left hand pain; Limb Swelling; Memory loss/Forgetfulness; Migraine Headaches; Primary osteoarthritis of right knee; Reflux; Retinal Detachment, Recent, Total; Right Knee Medial Meniscus Tear;  "Ringing in ears; Sciatica, right; Seasonal allergies; SOB (shortness of breath); Stomach Disorder; Thyroid disorder; Thyroid Problems; Ulcer         Past Surgical History  Artificial Joints/Limbs; Bladder Surg.; Carpal Tunnel Release; Cholecystectomy; Cyst Removal; Eye Implant; Gallbladder; Hip Replacement; Hysterectomy; Joint Surgery; Kidney; Knee surgery; Varicose Vein Ablation         Medication List  Alcohol Pads topical pads, medicated; alcohol swabs topical pads, medicated; ezetimibe 10 mg oral tablet; Lantus Solostar U-100 Insulin 100 unit/mL (3 mL) subcutaneous insulin pen; levothyroxine 112 mcg oral tablet; Macrobid 100 mg oral capsule; Novolog Flexpen U-100 Insulin 100 unit/mL (3 mL) subcutaneous insulin pen; Pen Needle 31 gauge x 3/16\" miscellaneous needle; Precision Xtra Test miscellaneous strip; Prilosec 40 mg         Allergy List  Actos; aspirin; Bydureon; Byetta; Celebrex; Cipro; Crestor; Darvocet-N 100; Demerol; digoxin; Dilaudid; glipizide; Januvia; Latex Exam Gloves; Levemir FlexTouch; Lipitor; metformin; morphine; Motrin; rifampin       Allergies Reconciled  Family Medical History  Stroke; Heart Disease; Cancer, Unspecified; Diabetes, unspecified type; Leukemia; Family history of certain chronic disabling diseases; arthritis; Osteoporosis         Reproductive History   4 Para 4 0 0 0 & Postmenopausal       Social History  Alcohol Use (Never); lives alone; Recreational Drug Use (Never); Retired.; Tobacco (Never);          Immunizations  Name Date Admin   Influenza Refused   [id number] Refused   Prevnar 13 Refused   Tdap          Review of Systems  · Constitutional  o Denies  o : fatigue, fever, weight loss, weight gain  · Eyes  o Denies  o : eye discomfort, eye pain  · HENT  o Denies  o : vertigo, nasal congestion  · Cardiovascular  o Denies  o : chest pain, irregular heart beats  · Respiratory  o Denies  o : shortness of breath, wheezing  · Gastrointestinal  o Denies  o : nausea, " "vomiting  · Genitourinary  o Admits  o : frequency, dysuria  · Integument  o Denies  o : rash, itching  · Neurologic  o Denies  o : muscular weakness, memory difficulties  · Musculoskeletal  o Denies  o : joint swelling, muscle pain  · Psychiatric  o Denies  o : anxiety, depression  · Heme-Lymph  o Denies  o : lightheadedness, easy bleeding  · Allergic-Immunologic  o Denies  o : sinus allergy symptoms, allergic dermatitis      Vitals  Date Time BP Position Site L\R Cuff Size HR RR TEMP (F) WT  HT  BMI kg/m2 BSA m2 O2 Sat        06/25/2020 03:53 /80 Sitting    86 - R 16 98.2 183lbs 8oz 5'  6\" 29.62 1.97 97 %          Physical Examination  · Constitutional  o Appearance  o : well-nourished, in no acute distress  · Eyes  o Conjunctivae  o : conjunctivae normal  o Sclerae  o : sclerae white  o Pupils and Irises  o : pupils equal, round, and reactive to light and accommodation bilaterally  o Eyelids/Ocular Adnexae  o : eyelid appearance normal, no exudates present  · Ears, Nose, Mouth and Throat  o Ears  o :   § External Ears  § : external auditory canal appearance within normal limits, no discharge present  § Otoscopic Examination  § : tympanic membrane appearance within normal limits bilaterally  o Nose  o :   § External Nose  § : appearance normal  § Nasopharynx  § : no discharge present  o Oral Cavity  o :   § Oral Mucosa  § : oral mucosa normal  § Lips  § : lip appearance normal  o Throat  o :   § Oropharynx  § : no inflammation or lesions present, tonsils within normal limits  · Neck  o Inspection/Palpation  o : normal appearance, no masses or tenderness, trachea midline  o Range of Motion  o : cervical range of motion within normal limits  o Thyroid  o : gland size normal, nontender, no nodules or masses present on palpation  o Jugular Veins  o : JVP normal  · Respiratory  o Respiratory Effort  o : breathing unlabored  o Inspection of Chest  o : normal appearance  o Auscultation of Lungs  o : normal breath " sounds throughout  · Cardiovascular  o Heart  o :   § Auscultation of Heart  § : regular rate and rhythm, no murmurs, gallops or rubs  o Peripheral Vascular System  o :   § Extremities  § : no edema  · Gastrointestinal  o Abdominal Examination  o : abdomen nontender to palpation, tone normal without rigidity or guarding, no masses present, bowel sounds present in all four quadrants  o Liver and spleen  o : no hepatomegaly present, liver nontender to palpation, spleen not palpable  o Hernias  o : no hernias present  · Lymphatic  o Neck  o : no lymphadenopathy present  · Musculoskeletal  o Spine  o :   § Inspection/Palpation  § : no spinal tenderness, scoliosis or kyphosis present  § Stability  § : no subluxations present  § Range of Motion  § : spine range of motion normal  o Right Upper Extremity  o :   § Inspection/Palpation  § : no tenderness to palpation, ROM normal  o Left Upper Extremity  o :   § Inspection/Palpation  § : no tenderness to palpation, ROM normal  o Right Lower Extremity  o :   § Inspection/Palpation  § : no joint or limb tenderness to palpation, ROM normal  o Left Lower Extremity  o :   § Inspection/Palpation  § : no joint or limb tenderness to palpation, ROM normal  · Skin and Subcutaneous Tissue  o General Inspection  o : no rashes or lesions present, no areas of discoloration  o Body Hair  o : scalp palpation normal, hair normal for age, general body hair distribution normal for age  o Digits and Nails  o : no clubbing, cyanosis, deformities or edema present, normal appearing nails  · Neurologic  o Mental Status Examination  o :   § Orientation  § : grossly oriented to person, place and time  o Gait and Station  o : normal gait, able to stand without difficulty  · Psychiatric  o Judgement and Insight  o : judgment and insight intact  o Mood and Affect  o : mood normal, affect appropriate          Results  · In-Office Procedures  o Lab procedure  § IOP - Urinalysis without Microscopy (Clinitek)  [facility] ([id number])   § Color Ur: Yellow   § Clarity Ur: Clear   § Glucose Ur Ql Strip: 250 mg/dL   § Bilirub Ur Ql Strip: Negative   § Ketones Ur Ql Strip: Negative   § Sp Gr Ur Qn: 1.025   § Hgb Ur Ql Strip: Trace-Intact   § pH Ur-LsCnc: 5.5   § Prot Ur Ql Strip: Negative   § Urobilinogen Ur Strip-mCnc: 0.2 E.U./dL   § Nitrite Ur Ql Strip: Positive   § WBC Est Ur Ql Strip: Small       Assessment  · Diabetes mellitus, type 2     250.00/E11.9  · Urinary tract infection     599.0/N39.0      Plan  · Orders  o CMP [facility] ([id number]) - 250.00/E11.9 - 06/25/2020  o Hgb A1c [facility] ([id number]) - 250.00/E11.9 - 06/25/2020  o Urine Culture (Clean Catch) ([id number], [id number]) - 599.0/N39.0 - 06/25/2020  o ACO-39: Current medications updated and reviewed () - - 06/25/2020  o ACO-14: Influenza immunization was not administered for reasons documented () - - 06/25/2020  · Medications  o Medications have been Reconciled  o Transition of Care or Provider Policy  · Instructions  o Patient was educated/instructed on their diagnosis, treatment and medications prior to discharge from the clinic today.  o Minutes spent with patient including greater than 50% in Education/Counseling/Care Coordination.  o Time spent with the patient was minutes, more than 50% face to face. 35 minutes  · Disposition  o f/u 3 months            Electronically Signed by: Drea Villalpando MD -Author on July 10, 2020 06:42:58 AM

## 2021-05-13 NOTE — PROGRESS NOTES
Progress Note      Patient Name: Sabi Haynes   Patient ID: [id number]   Sex: Female   YOB: 1940    Primary Care Provider: Kyle Lloyd DO   Referring Provider: Drea Villalpando MD    Visit Date: July 27, 2020    Provider: Drea Villalpando MD   Location: [facility]   Location Address: [address] [place] [address], [address]  [id number]   Location Phone: [phone]          Chief Complaint  · Diabetes Mellitus Type 2      History Of Present Illness  Sabi Haynes is a 79 year old /White female who presents for evaluation and treatment of:      DM type 2- Pt reports she took 46 units of one and 6 units of another.  Pt blood sugar lowered down to 61.  I am instructing her to stop using the short acting insulin and just increase to 50 units of lantus once a day to help stabilize her sugars.    Pt reports that she was told by Dr. Quintero that she has a torn rotator cuff and bone spurs.  Pt reports she thought that the machine that was used to her cystoscope may have hurt her becuaes she can't use the bathroom like regular again.       Past Medical History  Aftercare following Right knee arthroscopic partial medial meniscectomy; Aftercare following right medial meniscectomy; Allergy; Arthritis; Bladder Disorder; Diabetes; Diabetes mellitus; Heart Attack; Heart Disease; Hemorrhoids; High cholesterol; Hyperlipemia; Hypertension; Kidney Disease; Kidney stone; Left hand pain; Limb Swelling; Memory loss/Forgetfulness; Migraine Headaches; Primary osteoarthritis of right knee; Reflux; Retinal Detachment, Recent, Total; Right Knee Medial Meniscus Tear; Ringing in ears; Sciatica, right; Seasonal allergies; SOB (shortness of breath); Stomach Disorder; Stroke; Thyroid disorder; Thyroid Problems; Ulcer         Past Surgical History  Artificial Joints/Limbs; Bladder Surg.; Carpal Tunnel Release; Cholecystectomy; Colonoscopy; Cyst Removal; Eye Implant; Gallbladder; Hip Replacement;  "Hysterectomy; Joint Surgery; Kidney; Knee surgery; Varicose Vein Ablation         Medication List  Alcohol Pads topical pads, medicated; alcohol swabs topical pads, medicated; FreeStyle Lite Meter miscellaneous kit; FreeStyle Lite Strips miscellaneous strip; Lantus Solostar U-100 Insulin 100 unit/mL (3 mL) subcutaneous insulin pen; levothyroxine 112 mcg oral tablet; Novolog Flexpen U-100 Insulin 100 unit/mL (3 mL) subcutaneous insulin pen; Pen Needle 31 gauge x 3/\" miscellaneous needle; Precision Xtra Test miscellaneous strip; Prilosec 40 mg         Allergy List  NO KNOWN DRUG ALLERGIES; Actos; aspirin; Bydureon; Byetta; Celebrex; Cipro; Crestor; Darvocet-N 100; Demerol; digoxin; Dilaudid; glipizide; Januvia; Latex Exam Gloves; Levemir FlexTouch; Lipitor; metformin; morphine; Motrin; rifampin         Family Medical History  Stroke; Heart Disease; Cancer, Unspecified; Diabetes, unspecified type; Leukemia; Family history of certain chronic disabling diseases; arthritis; Osteoporosis         Reproductive History   4 Para 4 0 0 0 & Postmenopausal       Social History  Alcohol Use (Never); lives alone; Recreational Drug Use (Never); Retired.; Tobacco (Never);          Immunizations  Name Date Admin   Influenza Refused   [id number] Refused   Prevnar 13 Refused   Tdap          Review of Systems  · Constitutional  o Denies  o : fatigue, fever, weight loss, weight gain  · Eyes  o Denies  o : eye discomfort, eye pain  · HENT  o Denies  o : vertigo, nasal congestion  · Cardiovascular  o Denies  o : chest pain, irregular heart beats  · Respiratory  o Denies  o : shortness of breath, wheezing  · Gastrointestinal  o Denies  o : nausea, vomiting  · Genitourinary  o Denies  o : frequency, dysuria  · Integument  o Denies  o : rash, itching  · Neurologic  o Denies  o : muscular weakness, memory difficulties  · Musculoskeletal  o Denies  o : joint swelling, muscle pain  · Psychiatric  o Denies  o : anxiety, " "depression  · Heme-Lymph  o Denies  o : lightheadedness, easy bleeding  · Allergic-Immunologic  o Denies  o : sinus allergy symptoms, allergic dermatitis      Vitals  Date Time BP Position Site L\R Cuff Size HR RR TEMP (F) WT  HT  BMI kg/m2 BSA m2 O2 Sat        07/27/2020 03:48 /92 Sitting    91 - R  97.8 181lbs 0oz 5'  6\" 29.21 1.96 97 %          Physical Examination  · Constitutional  o Appearance  o : well-nourished, in no acute distress  · Eyes  o Conjunctivae  o : conjunctivae normal  o Sclerae  o : sclerae white  o Pupils and Irises  o : pupils equal, round, and reactive to light and accommodation bilaterally  o Eyelids/Ocular Adnexae  o : eyelid appearance normal, no exudates present  · Ears, Nose, Mouth and Throat  o Ears  o :   § External Ears  § : external auditory canal appearance within normal limits, no discharge present  § Otoscopic Examination  § : tympanic membrane appearance within normal limits bilaterally  o Nose  o :   § External Nose  § : appearance normal  § Nasopharynx  § : no discharge present  o Oral Cavity  o :   § Oral Mucosa  § : oral mucosa normal  § Lips  § : lip appearance normal  o Throat  o :   § Oropharynx  § : no inflammation or lesions present, tonsils within normal limits  · Neck  o Inspection/Palpation  o : normal appearance, no masses or tenderness, trachea midline  o Range of Motion  o : cervical range of motion within normal limits  o Thyroid  o : gland size normal, nontender, no nodules or masses present on palpation  o Jugular Veins  o : JVP normal  · Respiratory  o Respiratory Effort  o : breathing unlabored  o Inspection of Chest  o : normal appearance  o Auscultation of Lungs  o : normal breath sounds throughout  · Cardiovascular  o Heart  o :   § Auscultation of Heart  § : regular rate and rhythm, no murmurs, gallops or rubs  o Peripheral Vascular System  o :   § Extremities  § : no edema  · Gastrointestinal  o Abdominal Examination  o : abdomen nontender to " palpation, tone normal without rigidity or guarding, no masses present, bowel sounds present in all four quadrants  o Liver and spleen  o : no hepatomegaly present, liver nontender to palpation, spleen not palpable  o Hernias  o : no hernias present  · Lymphatic  o Neck  o : no lymphadenopathy present  · Musculoskeletal  o Spine  o :   § Inspection/Palpation  § : no spinal tenderness, scoliosis or kyphosis present  § Stability  § : no subluxations present  § Range of Motion  § : spine range of motion normal  o Right Upper Extremity  o :   § Inspection/Palpation  § : no tenderness to palpation, ROM normal  o Left Upper Extremity  o :   § Inspection/Palpation  § : no tenderness to palpation, ROM normal  o Right Lower Extremity  o :   § Inspection/Palpation  § : no joint or limb tenderness to palpation, ROM normal  o Left Lower Extremity  o :   § Inspection/Palpation  § : no joint or limb tenderness to palpation, ROM normal  · Skin and Subcutaneous Tissue  o General Inspection  o : no rashes or lesions present, no areas of discoloration  o Body Hair  o : scalp palpation normal, hair normal for age, general body hair distribution normal for age  o Digits and Nails  o : no clubbing, cyanosis, deformities or edema present, normal appearing nails  · Neurologic  o Mental Status Examination  o :   § Orientation  § : grossly oriented to person, place and time  o Gait and Station  o : normal gait, able to stand without difficulty  · Psychiatric  o Judgement and Insight  o : judgment and insight intact  o Mood and Affect  o : mood normal, affect appropriate          Assessment  · Diabetes mellitus type 2, uncontrolled, with complications       Type 2 diabetes mellitus with unspecified complications     250.92/E11.8  Type 2 diabetes mellitus with hyperglycemia     250.92/E11.65  · Shoulder pain     719.41/M25.519      Plan  · Orders  o ACO-39: Current medications updated and reviewed () - -  07/27/2020  · Medications  o GlucaGen HypoKit 1 mg injection recon soln   SIG: inject 1 milliliter (1 mg) by subcutaneous route once in thigh, upper arm, or buttocks for 1 day   DISP: (1) Vial with 2 refills  Prescribed on 07/27/2020     o Lantus Solostar U-100 Insulin 100 unit/mL (3 mL) subcutaneous insulin pen   SIG: inject 50 units by subcutaneous route daily for 90 days   DISP: (15) 3 ml syringe with 3 refills  Adjusted on 07/27/2020     o Medications have been Reconciled  o Transition of Care or Provider Policy  · Instructions  o Patient was educated/instructed on their diagnosis, treatment and medications prior to discharge from the clinic today.  o Minutes spent with patient including greater than 50% in Education/Counseling/Care Coordination.  o Time spent with the patient was minutes, more than 50% face to face. 25 minutes  · Disposition  o Return Visit Request in/on 2 weeks +/- 0 days ([id number]).            Electronically Signed by: Drea Villalpando MD -Author on August 10, 2020 03:15:35 PM

## 2021-05-14 ENCOUNTER — HOSPITAL ENCOUNTER (OUTPATIENT)
Dept: VACCINE CLINIC | Facility: HOSPITAL | Age: 81
Discharge: HOME | End: 2021-05-14
Attending: INTERNAL MEDICINE

## 2021-05-14 VITALS
OXYGEN SATURATION: 96 % | HEIGHT: 66 IN | BODY MASS INDEX: 29.77 KG/M2 | HEART RATE: 82 BPM | DIASTOLIC BLOOD PRESSURE: 78 MMHG | SYSTOLIC BLOOD PRESSURE: 124 MMHG | TEMPERATURE: 97.8 F | WEIGHT: 185.25 LBS

## 2021-05-14 VITALS — BODY MASS INDEX: 30.11 KG/M2 | HEIGHT: 66 IN | HEART RATE: 77 BPM | WEIGHT: 187.37 LBS | OXYGEN SATURATION: 96 %

## 2021-05-14 VITALS — OXYGEN SATURATION: 97 % | BODY MASS INDEX: 33.86 KG/M2 | HEART RATE: 84 BPM | WEIGHT: 184 LBS | HEIGHT: 62 IN

## 2021-05-14 VITALS
OXYGEN SATURATION: 96 % | HEIGHT: 62 IN | BODY MASS INDEX: 33.68 KG/M2 | TEMPERATURE: 97.7 F | DIASTOLIC BLOOD PRESSURE: 70 MMHG | WEIGHT: 183 LBS | HEART RATE: 83 BPM | SYSTOLIC BLOOD PRESSURE: 130 MMHG

## 2021-05-14 VITALS
SYSTOLIC BLOOD PRESSURE: 140 MMHG | WEIGHT: 184.12 LBS | BODY MASS INDEX: 29.59 KG/M2 | DIASTOLIC BLOOD PRESSURE: 78 MMHG | HEIGHT: 66 IN | TEMPERATURE: 97.5 F | OXYGEN SATURATION: 98 % | HEART RATE: 85 BPM

## 2021-05-14 NOTE — PROGRESS NOTES
Progress Note      Patient Name: Sabi Haynes   Patient ID: [id number]   Sex: Female   YOB: 1940    Primary Care Provider: Kyle Lloyd DO   Referring Provider: Drea Villalpando MD    Visit Date: January 18, 2021    Provider: Henri Quintero MD   Location: [facility]   Location Address: [address] [place] [address]  [id number]   Location Phone: [phone]          Chief Complaint  · Right shoulder pain       History Of Present Illness  Sabi Haynes is a 80 year old /White female who presents today to [facility].      Patient presents today with a follow-up of right shoulder pain. Patient complains of right shoulder pain ongoing since September after she had Kidney stones removed. Patient had an MRI did show RC tendinopathy and RC tear. Patient has received an injection 8 months ago that gave her minimal relief of pain. Patient has pain with overhead work and rotational motions. Patient states that she has trouble undressing and dressing herself.       Past Medical History  Aftercare following Right knee arthroscopic partial medial meniscectomy; Aftercare following right medial meniscectomy; Allergy; Arthritis; Bladder Disorder; Diabetes; Diabetes mellitus; Heart Attack; Heart Disease; Hemorrhoids; High cholesterol; Hyperlipemia; Hypertension; Kidney Disease; Kidney stone; Left hand pain; Limb Swelling; Memory loss/Forgetfulness; Migraine Headaches; Primary osteoarthritis of right knee; Reflux; Retinal Detachment, Recent, Total; Right Knee Medial Meniscus Tear; Ringing in ears; Sciatica, right; Seasonal allergies; SOB (shortness of breath); Stomach Disorder; Stroke; Thyroid disorder; Thyroid Problems; Ulcer         Past Surgical History  Artificial Joints/Limbs; Bladder Surg.; Carpal Tunnel Release; Cholecystectomy; Colonoscopy; Cyst Removal; Eye Implant; Gallbladder; Hip Replacement; Hysterectomy; Joint Surgery; Kidney; Knee surgery; Varicose Vein  "Ablation         Medication List  Alcohol Pads topical pads, medicated; alcohol swabs topical pads, medicated; FreeStyle Lite Meter miscellaneous kit; FreeStyle Lite Strips miscellaneous strip; Glucose Gel 40 % oral gel; Lantus Solostar U-100 Insulin 100 unit/mL (3 mL) subcutaneous insulin pen; levothyroxine 112 mcg oral tablet; Meter-Check miscellaneous solution; Pen Needle 31 gauge x 3/16\" miscellaneous needle; Precision Xtra Test miscellaneous strip; Prilosec 40 mg         Allergy List  NO KNOWN DRUG ALLERGIES; Actos; aspirin; Bydureon; Byetta; Celebrex; Cipro; Crestor; Darvocet-N 100; Demerol; digoxin; Dilaudid; glipizide; Januvia; Latex Exam Gloves; Levemir FlexTouch; Lipitor; metformin; morphine; Motrin; rifampin       Allergies Reconciled  Family Medical History  Stroke; Heart Disease; Cancer, Unspecified; Diabetes, unspecified type; Leukemia; Family history of certain chronic disabling diseases; arthritis; Osteoporosis         Reproductive History   4 Para 4 0 0 0 & Postmenopausal       Social History  Alcohol Use (Never); lives alone; Recreational Drug Use (Never); Retired.; Tobacco (Never);          Immunizations  Name Date Admin   Influenza Refused   [id number] Refused   Prevnar 13 Refused   Tdap 2017         Review of Systems  · Constitutional  o Denies  o : fever, chills, weight loss  · Cardiovascular  o Denies  o : chest pain, shortness of breath  · Gastrointestinal  o Denies  o : liver disease, heartburn, nausea, blood in stools  · Genitourinary  o Denies  o : painful urination, blood in urine  · Integument  o Denies  o : rash, itching  · Neurologic  o Denies  o : headache, weakness, loss of consciousness  · Musculoskeletal  o Denies  o : painful, swollen joints  · Psychiatric  o Denies  o : drug/alcohol addiction, anxiety, depression      Vitals  Date Time BP Position Site L\R Cuff Size HR RR TEMP (F) WT  HT  BMI kg/m2 BSA m2 O2 Sat FR L/min FiO2 HC       2021 01:35 PM      " "77 - R   187lbs 6oz 5'  6\" 30.24 1.99 96 %            Physical Examination  · Constitutional  o Appearance  o : well developed, well-nourished, no obvious deformities present  · Head and Face  o Head  o :   § Inspection  § : normocephalic  o Face  o :   § Inspection  § : no facial lesions  · Eyes  o Conjunctivae  o : conjunctivae normal  o Sclerae  o : sclerae white  · Ears, Nose, Mouth and Throat  o Ears  o :   § External Ears  § : appearance within normal limits  § Hearing  § : intact  o Nose  o :   § External Nose  § : appearance normal  · Neck  o Inspection/Palpation  o : normal appearance  o Range of Motion  o : full range of motion  · Respiratory  o Respiratory Effort  o : breathing unlabored  o Inspection of Chest  o : normal appearance  o Auscultation of Lungs  o : no audible wheezing or rales  · Cardiovascular  o Heart  o : regular rate  · Gastrointestinal  o Abdominal Examination  o : soft and non-tender  · Skin and Subcutaneous Tissue  o General Inspection  o : intact, no rashes  · Psychiatric  o General  o : Alert and oriented x3  o Judgement and Insight  o : judgment and insight intact  o Mood and Affect  o : mood normal, affect appropriate  · Right Shoulder  o Inspection  o : Sensation grossly intact. Neurovascular intact. Skin intact. Pain with drop arm. Pain with empty can testing. RTC strength 3 out of 5. Tender AC joint. Positive impingement testing. Tender biceps tendon. Good tone of deltoid, biceps, wrist flexors and extensors. No swelling, skin discoloration or atrophy. Pain with supination and pronation.   · Imaging  o Imaging  o : 3/16/20MRI right shoulder: 1) Rotator cuff tendinosis with mildly retracted full-thickness tear of the supraspinatus tendon at the footplate. There is partial articular sided tearing of the subscapularis and infraspinatus tendons as well as mild interstitial tearing of the proximal portions of the infraspinatus tendon. 2) Mild to moderate acromioclavicular and " glenohumeral osteoarthritis. 3) Mild pain labral degenerative tearing with no evidence of a focal displaced tear.           Assessment  · Right shoulder pain, unspecified chronicity     719.41/M25.511  · Rotator cuff tear, right shoulder     840.4/M75.100      Plan  · Medications  o Medications have been Reconciled  o Transition of Care or Provider Policy  · Instructions  o Dr. Quintero saw and examined the patient and agrees with plan.   o MRI reviewed by Dr. Quintero.  o Reviewed the patient's Past Medical, Social, and Family history as well as the ROS at today's visit, no changes.  o Call or return if worsening symptoms.  o Discussed surgery.  o Risks/benefits discussed with patient including, but not limited to: infection, bleeding, neurovascular damage, malunion, nonunion, aesthetic deformity, need for further surgery, and death.  o Surgery pamphlet given.  o Follow Up Post-Op.  o This note was transcribed by Bethany Saenz. avery  o Discussed diagnosis and treatment options with the patient. Patient has tried conservative measures with little relief. Patient wishes to proceed with a right shoulder arthroscopy.   o Electronically Identified Patient Education Materials Provided Electronically            Electronically Signed by: Bethany Saenz-, Other -Author on January 18, 2021 02:51:33 PM  Electronically Co-signed by: Henri Quintero MD -Reviewer on January 19, 2021 07:33:55 PM

## 2021-05-14 NOTE — PROGRESS NOTES
Progress Note      Patient Name: Sabi Haynes   Patient ID: [id number]   Sex: Female   YOB: 1940    Primary Care Provider: Drea Villalpando MD   Referring Provider: Drea Villalpando MD    Visit Date: March 19, 2021    Provider: Drea Villalpando MD   Location: [facility]   Location Address: [address] [place] [address], [address] [place] [address]  [id number]   Location Phone: [phone]          Chief Complaint  · [facility] follow up      History Of Present Illness  Sabi Haynes is a 80 year old /White female who presents for evaluation and treatment of:      UTIpatient was seen at [facility] on March 14, 2021.  Patient was diagnosed with urinary tract infection that grew out Klebsiella and was started on Bactrim which she has a few days left.  Patient reports she has noticed improvement informed her that she needs to continue antibiotic and we will do a test of cure in 2 weeks.    Diabetes type 2patient would like to have her labs checked since she missed her follow-up appointment from September for her diabetes.    Constipation- pt reports that since she started taking the Bactrim she has been constipated.       Past Medical History  Aftercare following Right knee arthroscopic partial medial meniscectomy; Aftercare following right medial meniscectomy; Allergy; Arthritis; Bladder disorder; Diabetes; Diabetes mellitus; Heart Attack; Heart Disease; Hemorrhoids; High cholesterol; Hyperlipemia; Hypertension; Kidney Disease; Kidney stone; Left hand pain; Limb Swelling; Memory loss/Forgetfulness; Migraine Headaches; Primary osteoarthritis of right knee; Reflux; Retinal Detachment, Recent, Total; Right Knee Medial Meniscus Tear; Ringing in ears; Sciatica, right; Seasonal allergies; SOB (shortness of breath); Stomach Disorder; Stroke; Thyroid disorder; Thyroid Problems; Ulcer         Past Surgical History  Artificial Joints/Limbs; Bladder Surg.; Carpal Tunnel Release;  "Cholecystectomy; Colonoscopy; Cyst Removal; Eye Implant; Gallbladder; Hip Replacement; Hysterectomy; Joint Surgery; Kidney; Knee surgery; Varicose Vein Ablation         Medication List  Alcohol Pads topical pads, medicated; alcohol swabs topical pads, medicated; dicyclomine 20 mg oral tablet; FreeStyle Lite Meter miscellaneous kit; FreeStyle Lite Strips miscellaneous strip; Glucose Gel 40 % oral gel; Lantus Solostar U-100 Insulin 100 unit/mL (3 mL) subcutaneous insulin pen; levothyroxine 112 mcg oral tablet; Meter-Check miscellaneous solution; Pen Needle 31 gauge x 3/16\" miscellaneous needle; Precision Xtra Test miscellaneous strip; Prilosec 40 mg; sulfamethoxazole-trimethoprim 800-160 mg oral tablet         Allergy List  NO KNOWN DRUG ALLERGIES; Actos; aspirin; Bydureon; Byetta; Celebrex; Cipro; Crestor; Darvocet-N 100; Demerol; digoxin; Dilaudid; glipizide; Januvia; Latex Exam Gloves; Levemir FlexTouch; Lipitor; metformin; morphine; Motrin; rifampin         Family Medical History  Stroke; Heart Disease; Cancer, Unspecified; Diabetes, unspecified type; Leukemia; Family history of certain chronic disabling diseases; arthritis; Osteoporosis         Reproductive History   4 Para 4 0 0 0 & Postmenopausal       Social History  Alcohol Use (Never); lives alone; Recreational Drug Use (Never); Retired.; Tobacco (Never);          Immunizations  Name Date Admin   Influenza Refused   [id number] Refused   Prevnar 13 Refused   Tdap 2017         Review of Systems  · Constitutional  o Denies  o : fatigue, fever, weight loss, weight gain  · Eyes  o Denies  o : eye discomfort, eye pain  · HENT  o Denies  o : vertigo, nasal congestion  · Cardiovascular  o Denies  o : chest pain, irregular heart beats  · Respiratory  o Denies  o : shortness of breath, wheezing  · Gastrointestinal  o Admits  o : constipation  o Denies  o : nausea, vomiting  · Genitourinary  o Denies  o : frequency, " "dysuria  · Integument  o Denies  o : rash, itching  · Neurologic  o Denies  o : muscular weakness, memory difficulties  · Musculoskeletal  o Denies  o : joint swelling, muscle pain  · Psychiatric  o Denies  o : anxiety, depression  · Heme-Lymph  o Denies  o : lightheadedness, easy bleeding  · Allergic-Immunologic  o Denies  o : sinus allergy symptoms, allergic dermatitis      Vitals  Date Time BP Position Site L\R Cuff Size HR RR TEMP (F) WT  HT  BMI kg/m2 BSA m2 O2 Sat FR L/min FiO2        03/19/2021 05:48 /70 Sitting    83 - R  97.7 183lbs 0oz 5'  2\" 33.47 1.91 96 %  21%          Physical Examination  · Constitutional  o Appearance  o : well-nourished, in no acute distress  · Eyes  o Conjunctivae  o : conjunctivae normal  o Sclerae  o : sclerae white  o Pupils and Irises  o : pupils equal, round, and reactive to light and accommodation bilaterally  o Eyelids/Ocular Adnexae  o : eyelid appearance normal, no exudates present  · Ears, Nose, Mouth and Throat  o Ears  o :   § External Ears  § : external auditory canal appearance within normal limits, no discharge present  § Otoscopic Examination  § : tympanic membrane appearance within normal limits bilaterally  o Nose  o :   § External Nose  § : appearance normal  § Nasopharynx  § : no discharge present  o Oral Cavity  o :   § Oral Mucosa  § : oral mucosa normal  § Lips  § : lip appearance normal  o Throat  o :   § Oropharynx  § : no inflammation or lesions present, tonsils within normal limits  · Neck  o Inspection/Palpation  o : normal appearance, no masses or tenderness, trachea midline  o Range of Motion  o : cervical range of motion within normal limits  o Thyroid  o : gland size normal, nontender, no nodules or masses present on palpation  o Jugular Veins  o : JVP normal  · Respiratory  o Respiratory Effort  o : breathing unlabored  o Inspection of Chest  o : normal appearance  o Auscultation of Lungs  o : normal breath sounds " throughout  · Cardiovascular  o Heart  o :   § Auscultation of Heart  § : regular rate and rhythm, no murmurs, gallops or rubs  o Peripheral Vascular System  o :   § Extremities  § : no edema  · Gastrointestinal  o Abdominal Examination  o : abdomen nontender to palpation, tone normal without rigidity or guarding, no masses present, bowel sounds present in all four quadrants  o Liver and spleen  o : no hepatomegaly present, liver nontender to palpation, spleen not palpable  o Hernias  o : no hernias present  · Lymphatic  o Neck  o : no lymphadenopathy present  · Musculoskeletal  o Spine  o :   § Inspection/Palpation  § : no spinal tenderness, scoliosis or kyphosis present  § Stability  § : no subluxations present  § Range of Motion  § : spine range of motion normal  o Right Upper Extremity  o :   § Inspection/Palpation  § : no tenderness to palpation, ROM normal  o Left Upper Extremity  o :   § Inspection/Palpation  § : no tenderness to palpation, ROM normal  o Right Lower Extremity  o :   § Inspection/Palpation  § : no joint or limb tenderness to palpation, ROM normal  o Left Lower Extremity  o :   § Inspection/Palpation  § : no joint or limb tenderness to palpation, ROM normal  · Skin and Subcutaneous Tissue  o General Inspection  o : no rashes or lesions present, no areas of discoloration  o Body Hair  o : scalp palpation normal, hair normal for age, general body hair distribution normal for age  o Digits and Nails  o : no clubbing, cyanosis, deformities or edema present, normal appearing nails  · Neurologic  o Mental Status Examination  o :   § Orientation  § : grossly oriented to person, place and time  o Gait and Station  o : normal gait, able to stand without difficulty  · Psychiatric  o Judgement and Insight  o : judgment and insight intact  o Mood and Affect  o : mood normal, affect appropriate          Results  · In-Office Procedures  o Lab procedure  § IOP - Urinalysis without Microscopy (Clinitek) [facility]  (01603)   § Color Ur: Yellow   § Clarity Ur: Clear   § Glucose Ur Ql Strip: Negative   § Bilirub Ur Ql Strip: Negative   § Ketones Ur Ql Strip: Negative   § Sp Gr Ur Qn: 1.020   § Hgb Ur Ql Strip: Negative   § pH Ur-LsCnc: 5.5   § Prot Ur Ql Strip: Negative   § Urobilinogen Ur Strip-mCnc: 0.2 E.U./dL   § Nitrite Ur Ql Strip: Negative   § WBC Est Ur Ql Strip: Negative       Assessment  · Diabetes mellitus, type 2     250.00/E11.9  · Urinary tract infection     599.0/N39.0  · Constipation     564.00/K59.00      Plan  · Orders  o Diabetes 2 Panel (Urine Microalbumin, CMP, Lipid, A1c, ) [facility] (24112, [address]807, 73867, [address]245) - 250.00/E11.9 - 03/19/2021  o ACO-39: Current medications updated and reviewed (1159F, ) - - 03/19/2021  · Medications  o Colace 100 mg oral capsule   SIG: take 1 capsule (100 mg) by oral route 2 times per day as needed for 30 days   DISP: (60) Capsule with 0 refills  Prescribed on 03/19/2021     o Medications have been Reconciled  o Transition of Care or Provider Policy  · Instructions  o Patient was educated/instructed on their diagnosis, treatment and medications prior to discharge from the clinic today.  o Minutes spent with patient including greater than 50% in Education/Counseling/Care Coordination.  o Time spent with the patient was minutes, more than 50% face to face. 25 minutes  · Disposition  o Return Visit Request in/on 2 weeks +/- 0 days (36522).            Electronically Signed by: Drea Villalpando MD -Author on March 19, 2021 05:52:49 PM

## 2021-05-14 NOTE — PROGRESS NOTES
"   Progress Note      Patient Name: Sabi Haynes   Patient ID: [id number]   Sex: Female   YOB: 1940    Primary Care Provider: Drea Villalpando MD   Referring Provider: Drea Villalpando MD    Visit Date: April 2, 2021    Provider: Drea Villalpando MD   Location: [facility]   Location Address: [address] [place] [address], [address] [place] [address]  [id number]   Location Phone: [phone]          Chief Complaint  · Right Rotator Cuff Tear      History Of Present Illness  Sabi Haynes is a 80 year old /White female who presents for evaluation and treatment of:      Pt reports she had a heart test in [place] last year which was done by [facility].  Pt reports she was seeing a Cardiologist in [facility] but she couldn't hear him so she went to someone else in [place].    Right Rotator Cuff Tear- pt reports that she still needs Dr. Quintero to fix her shoulder but she wants to get her \"kidneys checked first\".     DM type 2- Pt last Hgb A1C was 8.7    Constipation- pt reports she was taking Miralax to help with constipation and wants to have a prescription.       Past Medical History  Aftercare following Right knee arthroscopic partial medial meniscectomy; Aftercare following right medial meniscectomy; Allergy; Arthritis; Bladder disorder; Diabetes; Diabetes mellitus; Heart Attack; Heart Disease; Hemorrhoids; High cholesterol; Hyperlipemia; Hypertension; Kidney Disease; Kidney Stone; Left hand pain; Limb Swelling; Memory loss/Forgetfulness; Migraine Headaches; Primary osteoarthritis of right knee; Reflux; Retinal Detachment, Recent, Total; Right Knee Medial Meniscus Tear; Ringing in ears; Sciatica, right; Seasonal allergies; SOB (shortness of breath); Stomach Disorder; Stroke; Thyroid disorder; Thyroid Problems; Ulcer         Past Surgical History  Artificial Joints/Limbs; Bladder Surg.; Carpal Tunnel Release; Cholecystectomy; Colonoscopy; Cyst Removal; Eye Implant; Gallbladder; Hip " "Replacement; Hysterectomy; Joint Surgery; Kidney; Knee surgery; Varicose Vein Ablation         Medication List  Alcohol Pads topical pads, medicated; alcohol swabs topical pads, medicated; Colace 100 mg oral capsule; FreeStyle Lite Meter miscellaneous kit; FreeStyle Lite Strips miscellaneous strip; Glucose Gel 40 % oral gel; Lantus Solostar U-100 Insulin 100 unit/mL (3 mL) subcutaneous insulin pen; levothyroxine 112 mcg oral tablet; Macrobid 100 mg oral capsule; Meter-Check miscellaneous solution; Pen Needle 31 gauge x 3/16\" miscellaneous needle; Precision Xtra Test miscellaneous strip; Prilosec 40 mg         Allergy List  NO KNOWN DRUG ALLERGIES; Actos; aspirin; Bydureon; Byetta; Celebrex; Cipro; Crestor; Darvocet-N 100; Demerol; digoxin; Dilaudid; glipizide; Januvia; Latex Exam Gloves; Levemir FlexTouch; Lipitor; metformin; morphine; Motrin; rifampin       Allergies Reconciled  Family Medical History  Stroke; Heart Disease; Cancer, Unspecified; Diabetes, unspecified type; Leukemia; Family history of certain chronic disabling diseases; arthritis; Osteoporosis         Reproductive History   4 Para 4 0 0 0 & Postmenopausal       Social History  Alcohol Use (Never); lives alone; Recreational Drug Use (Never); Retired.; Tobacco (Never);          Immunizations  Name Date Admin   Influenza Refused   [id number] Refused   Prevnar 13 Refused   Tdap 2017         Review of Systems  · Constitutional  o Denies  o : fatigue, night sweats  · Eyes  o Denies  o : double vision, blurred vision  · HENT  o Denies  o : vertigo, recent head injury  · Breasts  o Denies  o : abnormal changes in breast size, additional breast symptoms except as noted in the HPI  · Cardiovascular  o Denies  o : chest pain, irregular heart beats  · Respiratory  o Denies  o : shortness of breath, productive cough  · Gastrointestinal  o Denies  o : nausea, vomiting  · Genitourinary  o Denies  o : dysuria, urinary " "retention  · Integument  o Denies  o : hair growth change, new skin lesions  · Neurologic  o Denies  o : altered mental status, seizures  · Musculoskeletal  o Denies  o : joint swelling, limitation of motion  · Endocrine  o Denies  o : cold intolerance, heat intolerance  · Heme-Lymph  o Denies  o : petechiae, lymph node enlargement or tenderness  · Allergic-Immunologic  o Denies  o : frequent illnesses      Vitals  Date Time BP Position Site L\R Cuff Size HR RR TEMP (F) WT  HT  BMI kg/m2 BSA m2 O2 Sat FR L/min FiO2        04/02/2021 01:15 /78 Sitting    82 - R  97.8 185lbs 4oz 5'  6\" 29.9 1.98 96 %            Physical Examination  · Constitutional  o Appearance  o : well-nourished, in no acute distress  · Eyes  o Conjunctivae  o : conjunctivae normal  o Sclerae  o : sclerae white  o Pupils and Irises  o : pupils equal, round, and reactive to light and accommodation bilaterally  o Eyelids/Ocular Adnexae  o : eyelid appearance normal, no exudates present  · Ears, Nose, Mouth and Throat  o Ears  o :   § External Ears  § : external auditory canal appearance within normal limits, no discharge present  § Otoscopic Examination  § : tympanic membrane appearance within normal limits bilaterally  o Nose  o :   § External Nose  § : appearance normal  § Nasopharynx  § : no discharge present  o Oral Cavity  o :   § Oral Mucosa  § : oral mucosa normal  § Lips  § : lip appearance normal  o Throat  o :   § Oropharynx  § : no inflammation or lesions present, tonsils within normal limits  · Neck  o Inspection/Palpation  o : normal appearance, no masses or tenderness, trachea midline  o Range of Motion  o : cervical range of motion within normal limits  o Thyroid  o : gland size normal, nontender, no nodules or masses present on palpation  o Jugular Veins  o : JVP normal  · Respiratory  o Respiratory Effort  o : breathing unlabored  o Inspection of Chest  o : normal appearance  o Auscultation of Lungs  o : normal breath sounds " throughout  · Cardiovascular  o Heart  o :   § Auscultation of Heart  § : regular rate and rhythm, no murmurs, gallops or rubs  o Peripheral Vascular System  o :   § Extremities  § : no edema  · Gastrointestinal  o Abdominal Examination  o : abdomen nontender to palpation, tone normal without rigidity or guarding, no masses present, bowel sounds present in all four quadrants  o Liver and spleen  o : no hepatomegaly present, liver nontender to palpation, spleen not palpable  o Hernias  o : no hernias present  · Lymphatic  o Neck  o : no lymphadenopathy present  · Musculoskeletal  o Spine  o :   § Inspection/Palpation  § : no spinal tenderness, scoliosis or kyphosis present  § Stability  § : no subluxations present  § Range of Motion  § : spine range of motion normal  o Right Upper Extremity  o :   § Inspection/Palpation  § : no tenderness to palpation, ROM normal  o Left Upper Extremity  o :   § Inspection/Palpation  § : no tenderness to palpation, ROM normal  o Right Lower Extremity  o :   § Inspection/Palpation  § : no joint or limb tenderness to palpation, ROM normal  o Left Lower Extremity  o :   § Inspection/Palpation  § : no joint or limb tenderness to palpation, ROM normal  · Skin and Subcutaneous Tissue  o General Inspection  o : no rashes or lesions present, no areas of discoloration  o Body Hair  o : scalp palpation normal, hair normal for age, general body hair distribution normal for age  o Digits and Nails  o : no clubbing, cyanosis, deformities or edema present, normal appearing nails  · Neurologic  o Mental Status Examination  o :   § Orientation  § : grossly oriented to person, place and time  o Gait and Station  o : normal gait, able to stand without difficulty  · Psychiatric  o Judgement and Insight  o : judgment and insight intact  o Mood and Affect  o : mood normal, affect appropriate          Results  · In-Office Procedures  o Lab procedure  § IOP - Urinalysis without Microscopy (Clinitek) [facility]  ([address]130)   § Color Ur: Yellow   § Clarity Ur: Cloudy   § Glucose Ur Ql Strip: 100 mg/dL   § Bilirub Ur Ql Strip: Negative   § Ketones Ur Ql Strip: Negative   § Sp Gr Ur Qn: greater than 1.030   § Hgb Ur Ql Strip: Trace-Intact   § pH Ur-LsCnc: 5.5   § Prot Ur Ql Strip: Negative   § Urobilinogen Ur Strip-mCnc: 0.2 E.U./dL   § Nitrite Ur Ql Strip: Positive   § WBC Est Ur Ql Strip: Trace       Assessment  · Diabetes mellitus type 1     250.01/E10.9  · Urinary tract infection     599.0/N39.0  · Vitamin D deficiency     268.9/E55.9  · Vitamin B12 deficiency     266.2/E53.8  · Heart disease     429.9/I51.9  · History of heart attack     412/I25.2  · Rotator cuff injury     959.2/S46.009A  Right sided      Plan  · Orders  o Diabetes 1 Panel (CMP, Lipid, A1c) [facility] (62519, 45048, 22489) - 250.01/E10.9 - 04/02/2021  o Thyroid Profile (07255, 72478, THYII) - 250.01/E10.9 - 04/02/2021  o Vitamin D Level (58772) - 268.9/E55.9 - 04/02/2021  o Urine culture (03601, 20273) - 599.0/N39.0 - 04/02/2021  o ACO-39: Current medications updated and reviewed (, 1159F) - - 04/02/2021  o Vitamin B12 level (46142) - 266.2/E53.8 - 04/02/2021  o CARDIOLOGY CONSULTATION (CARDI) - 429.9/I51.9, 412/I25.2 - 04/06/2021   pt wants to go to cardiologist she went to in [place]  o ORTHOPEDIC CONSULTATION (ORTHO) - 959.2/S46.009A - 04/06/2021   Dr. Quintero  · Medications  o Bactrim -160 mg oral tablet   SIG: take 1 tablet by oral route every 12 hours for 10 days   DISP: (20) Tablet with 0 refills  Prescribed on 04/06/2021     o Medications have been Reconciled  o Transition of Care or Provider Policy  · Instructions  o Patient was educated/instructed on their diagnosis, treatment and medications prior to discharge from the clinic today.  o Minutes spent with patient including greater than 50% in Education/Counseling/Care Coordination.  o Time spent with the patient was minutes, more than 50% face to face. 25 minutes  · Disposition  o Return Visit  Request in/on 1 month +/- 0 days (78288).            Electronically Signed by: Drea Villalpando MD -Author on April 6, 2021 02:11:17 PM

## 2021-05-15 VITALS
DIASTOLIC BLOOD PRESSURE: 54 MMHG | TEMPERATURE: 97.6 F | SYSTOLIC BLOOD PRESSURE: 130 MMHG | OXYGEN SATURATION: 97 % | WEIGHT: 187 LBS | BODY MASS INDEX: 31.16 KG/M2 | HEIGHT: 65 IN | HEART RATE: 78 BPM

## 2021-05-15 VITALS
DIASTOLIC BLOOD PRESSURE: 92 MMHG | SYSTOLIC BLOOD PRESSURE: 162 MMHG | WEIGHT: 181 LBS | TEMPERATURE: 97.8 F | OXYGEN SATURATION: 97 % | BODY MASS INDEX: 29.09 KG/M2 | HEIGHT: 66 IN | HEART RATE: 91 BPM

## 2021-05-15 VITALS — HEIGHT: 66 IN | OXYGEN SATURATION: 96 % | BODY MASS INDEX: 28.93 KG/M2 | WEIGHT: 180 LBS | HEART RATE: 102 BPM

## 2021-05-15 VITALS
RESPIRATION RATE: 16 BRPM | SYSTOLIC BLOOD PRESSURE: 138 MMHG | TEMPERATURE: 98.2 F | WEIGHT: 183.5 LBS | DIASTOLIC BLOOD PRESSURE: 80 MMHG | HEIGHT: 66 IN | BODY MASS INDEX: 29.49 KG/M2 | OXYGEN SATURATION: 97 % | HEART RATE: 86 BPM

## 2021-05-15 VITALS
DIASTOLIC BLOOD PRESSURE: 66 MMHG | HEIGHT: 66 IN | SYSTOLIC BLOOD PRESSURE: 138 MMHG | TEMPERATURE: 98.1 F | WEIGHT: 186.5 LBS | OXYGEN SATURATION: 97 % | HEART RATE: 77 BPM | BODY MASS INDEX: 29.97 KG/M2

## 2021-05-15 VITALS — OXYGEN SATURATION: 96 % | HEIGHT: 66 IN | BODY MASS INDEX: 29.73 KG/M2 | HEART RATE: 74 BPM | WEIGHT: 185 LBS

## 2021-05-15 VITALS
TEMPERATURE: 97.5 F | HEIGHT: 65 IN | DIASTOLIC BLOOD PRESSURE: 60 MMHG | SYSTOLIC BLOOD PRESSURE: 110 MMHG | HEART RATE: 90 BPM | WEIGHT: 186 LBS | RESPIRATION RATE: 16 BRPM | BODY MASS INDEX: 30.99 KG/M2 | OXYGEN SATURATION: 96 %

## 2021-05-15 VITALS
HEIGHT: 64 IN | TEMPERATURE: 97.9 F | WEIGHT: 186 LBS | BODY MASS INDEX: 31.76 KG/M2 | DIASTOLIC BLOOD PRESSURE: 60 MMHG | HEART RATE: 76 BPM | OXYGEN SATURATION: 95 % | SYSTOLIC BLOOD PRESSURE: 130 MMHG

## 2021-05-15 VITALS — WEIGHT: 186 LBS | BODY MASS INDEX: 31.76 KG/M2 | HEIGHT: 64 IN

## 2021-05-15 VITALS — OXYGEN SATURATION: 97 % | HEART RATE: 93 BPM | WEIGHT: 186 LBS | HEIGHT: 66 IN | BODY MASS INDEX: 29.89 KG/M2

## 2021-05-15 VITALS
DIASTOLIC BLOOD PRESSURE: 74 MMHG | HEART RATE: 72 BPM | SYSTOLIC BLOOD PRESSURE: 142 MMHG | WEIGHT: 186.37 LBS | BODY MASS INDEX: 29.95 KG/M2 | OXYGEN SATURATION: 96 % | TEMPERATURE: 98 F | HEIGHT: 66 IN

## 2021-05-15 VITALS — WEIGHT: 184.25 LBS | OXYGEN SATURATION: 97 % | HEIGHT: 66 IN | BODY MASS INDEX: 29.61 KG/M2 | HEART RATE: 106 BPM

## 2021-05-16 VITALS
TEMPERATURE: 97.6 F | HEIGHT: 65 IN | WEIGHT: 186 LBS | OXYGEN SATURATION: 98 % | BODY MASS INDEX: 30.99 KG/M2 | DIASTOLIC BLOOD PRESSURE: 84 MMHG | HEART RATE: 72 BPM | SYSTOLIC BLOOD PRESSURE: 140 MMHG | RESPIRATION RATE: 16 BRPM

## 2021-05-16 VITALS
DIASTOLIC BLOOD PRESSURE: 72 MMHG | BODY MASS INDEX: 31.16 KG/M2 | RESPIRATION RATE: 16 BRPM | HEIGHT: 65 IN | WEIGHT: 187 LBS | OXYGEN SATURATION: 96 % | SYSTOLIC BLOOD PRESSURE: 136 MMHG | HEART RATE: 85 BPM | TEMPERATURE: 97.6 F

## 2021-05-16 VITALS
DIASTOLIC BLOOD PRESSURE: 85 MMHG | HEART RATE: 84 BPM | BODY MASS INDEX: 29.89 KG/M2 | TEMPERATURE: 97 F | RESPIRATION RATE: 16 BRPM | SYSTOLIC BLOOD PRESSURE: 139 MMHG | HEIGHT: 66 IN | WEIGHT: 186 LBS | OXYGEN SATURATION: 97 %

## 2021-05-16 VITALS
OXYGEN SATURATION: 96 % | WEIGHT: 185.5 LBS | DIASTOLIC BLOOD PRESSURE: 80 MMHG | TEMPERATURE: 97.9 F | HEIGHT: 65 IN | BODY MASS INDEX: 30.91 KG/M2 | SYSTOLIC BLOOD PRESSURE: 138 MMHG | HEART RATE: 93 BPM

## 2021-05-16 VITALS
BODY MASS INDEX: 31.2 KG/M2 | DIASTOLIC BLOOD PRESSURE: 72 MMHG | SYSTOLIC BLOOD PRESSURE: 136 MMHG | TEMPERATURE: 97.5 F | OXYGEN SATURATION: 95 % | WEIGHT: 187.25 LBS | HEIGHT: 65 IN | HEART RATE: 89 BPM

## 2021-05-16 VITALS — DIASTOLIC BLOOD PRESSURE: 82 MMHG | OXYGEN SATURATION: 99 % | HEART RATE: 83 BPM | SYSTOLIC BLOOD PRESSURE: 160 MMHG

## 2021-05-16 VITALS — RESPIRATION RATE: 16 BRPM | BODY MASS INDEX: 30.82 KG/M2 | HEIGHT: 65 IN | WEIGHT: 185 LBS

## 2021-05-17 ENCOUNTER — OFFICE VISIT CONVERTED (OUTPATIENT)
Dept: ORTHOPEDIC SURGERY | Facility: CLINIC | Age: 81
End: 2021-05-17
Attending: ORTHOPAEDIC SURGERY

## 2021-05-17 ENCOUNTER — CONVERSION ENCOUNTER (OUTPATIENT)
Dept: ORTHOPEDIC SURGERY | Facility: CLINIC | Age: 81
End: 2021-05-17

## 2021-06-05 NOTE — PROGRESS NOTES
Progress Note      Patient Name: Sabi Haynes   Patient ID: [id number]   Sex: Female   YOB: 1940    Primary Care Provider: Drea Villalpando MD   Referring Provider: Drea Villalpando MD    Visit Date: May 17, 2021    Provider: Henri Quintero MD   Location: [facility]   Location Address: [address] [place] [address]  [id number]   Location Phone: [phone]          Chief Complaint  · Bilateral Knee Pain  · Right Shoulder Pain      History Of Present Illness  Sabi Haynes is a 80 year old /White female who presents today to [facility].      Patient presents today for an evaluation of bilateral knees and right shoulder. Patient had a fall 2 weeks resulting in bilateral knees and right shoulder pain. She has been having right shoulder pain since 2019. She states she couldn't proceed with right shoulder surgery earlier this year because she got sick. She was going to have a right shoulder arthroscopy. She had a right rotator cuff tear.       Past Medical History  Aftercare following Right knee arthroscopic partial medial meniscectomy; Aftercare following right medial meniscectomy; Allergy; Arthritis; Bladder disorder; Diabetes; Diabetes mellitus; Heart Attack; Heart Disease; Hemorrhoids; High cholesterol; Hyperlipemia; Hypertension; Kidney Disease; Kidney stone; Left hand pain; Limb Swelling; Memory loss/Forgetfulness; Migraine Headaches; Primary osteoarthritis of right knee; Reflux; Retinal Detachment, Recent, Total; Right Knee Medial Meniscus Tear; Ringing in ears; Sciatica, right; Seasonal allergies; SOB (shortness of breath); Stomach Disorder; Stroke; Thyroid disorder; Thyroid Problems; Ulcer         Past Surgical History  Artificial Joints/Limbs; Bladder Surg.; Carpal Tunnel Release; Cholecystectomy; Colonoscopy; Cyst Removal; Eye Implant; Gallbladder; Hip Replacement; Hysterectomy; Joint Surgery; Kidney; Knee surgery; Varicose Vein Ablation  "        Medication List  Alcohol Pads topical pads, medicated; alcohol swabs topical pads, medicated; Bactrim -160 mg oral tablet; Colace 100 mg oral capsule; FreeStyle Lite Meter miscellaneous kit; FreeStyle Lite Strips miscellaneous strip; Glucose Gel 40 % oral gel; Lantus Solostar U-100 Insulin 100 unit/mL (3 mL) subcutaneous insulin pen; levothyroxine 112 mcg oral tablet; Macrobid 100 mg oral capsule; Meter-Check miscellaneous solution; Pen Needle 31 gauge x 3/16\" miscellaneous needle; Precision Xtra Test miscellaneous strip; Prilosec 40 mg         Allergy List  NO KNOWN DRUG ALLERGIES; Actos; aspirin; Bydureon; Byetta; Celebrex; Cipro; Crestor; Darvocet-N 100; Demerol; digoxin; Dilaudid; glipizide; Januvia; Latex Exam Gloves; Levemir FlexTouch; Lipitor; metformin; morphine; Motrin; rifampin       Allergies Reconciled  Family Medical History  Stroke; Heart Disease; Cancer, Unspecified; Diabetes, unspecified type; Leukemia; Family history of certain chronic disabling diseases; arthritis; Osteoporosis         Reproductive History   4 Para 4 0 0 0 & Postmenopausal       Social History  Alcohol Use (Never); lives alone; Recreational Drug Use (Never); Retired.; Tobacco (Never);          Immunizations  Name Date Admin   Influenza Refused   [id number] Refused   Prevnar 13 Refused   Tdap 2017         Review of Systems  · Constitutional  o Denies  o : fever, chills, weight loss  · Cardiovascular  o Denies  o : chest pain, shortness of breath  · Gastrointestinal  o Denies  o : liver disease, heartburn, nausea, blood in stools  · Genitourinary  o Denies  o : painful urination, blood in urine  · Integument  o Denies  o : rash, itching  · Neurologic  o Denies  o : headache, weakness, loss of consciousness  · Musculoskeletal  o Denies  o : painful, swollen joints  · Psychiatric  o Denies  o : drug/alcohol addiction, anxiety, depression      Vitals  Date Time BP Position Site L\R Cuff Size HR RR TEMP " "(F) WT  HT  BMI kg/m2 BSA m2 O2 Sat FR L/min FiO2 HC       05/17/2021 03:23 PM         184lbs 16oz 5'  6\" 29.86 1.98             Physical Examination  · Constitutional  o Appearance  o : well developed, well-nourished, no obvious deformities present  · Head and Face  o Head  o :   § Inspection  § : normocephalic  o Face  o :   § Inspection  § : no facial lesions  · Eyes  o Conjunctivae  o : conjunctivae normal  o Sclerae  o : sclerae white  · Ears, Nose, Mouth and Throat  o Ears  o :   § External Ears  § : appearance within normal limits  § Hearing  § : intact  o Nose  o :   § External Nose  § : appearance normal  · Neck  o Inspection/Palpation  o : normal appearance  o Range of Motion  o : full range of motion  · Respiratory  o Respiratory Effort  o : breathing unlabored  o Inspection of Chest  o : normal appearance  o Auscultation of Lungs  o : no audible wheezing or rales  · Cardiovascular  o Heart  o : regular rate  · Gastrointestinal  o Abdominal Examination  o : soft and non-tender  · Skin and Subcutaneous Tissue  o General Inspection  o : intact, no rashes  · Psychiatric  o General  o : Alert and oriented x3  o Judgement and Insight  o : judgment and insight intact  o Mood and Affect  o : mood normal, affect appropriate  · Right Shoulder  o Inspection  o : No skin discoloration or swelling. Near-full passive shoulder range of motion. Tender AC joint. Positive empty can. Positive impingement signs. Good tone of deltoid, biceps, triceps, wrist extensors, and wrist flexors. Neurovascular intact. Sensation grossly intact. IR to back pocket. Weakness with strength.   · Extremities  o Extremities  o : BILATERAL KNEES: No swelling, skin discoloration or atrophy. Skin intact. Negative Lachman. Negative posterior sag. Stable to valgus/varus stress. Good strength in quadriceps, hamstrings, dorsiflexors, and plantar flexors. Sensation grossly intact. Neurovascular intact. Non-tender medial and lateral joint line. Weight " bearing. Non-antalgic gait. Full flexion and extension.   · In Office Procedures  o View  o : LAT/SUNRISE/STANDING   o Site  o : bilateral, knee  o Indication  o : Bilateral Knee Pain  o Study  o : X-rays ordered, taken in the office, and reviewed today.  o Xray  o : Mild degenerative changes of bilateral knees. No fracture or dislocation.           Assessment  · Pain in both knees, unspecified chronicity       Pain in right knee     719.46/M25.561  Pain in left knee     719.46/M25.562  · Right shoulder pain, unspecified chronicity     719.41/M25.511  · Rotator cuff tear of right shoulder     840.4/M75.100      Plan  · Orders  o Knee (Left) [facility] ([id number]) - 719.46/M25.562 - 05/17/2021  o Knee (Right) [facility] ([id number]) - 719.46/M25.561 - 05/17/2021  · Medications  o Medications have been Reconciled  o Transition of Care or Provider Policy  · Instructions  o Dr. Quintero saw and examined the patient and agrees with plan.   o X-rays reviewed by Dr. Quintero.  o Reviewed the patient's Past Medical, Social, and Family history as well as the ROS at today's visit, no changes.  o Call or return if worsening symptoms.  o Discussed surgery.  o Risks/benefits discussed with patient including, but not limited to: infection, bleeding, neurovascular damage, re-rupture, aesthetic deformity, need for further surgery, and death.  o Surgery pamphlet given.  o Follow Up Post-op.  o Discussed treatment plans and diagnosis with the patient. Discussed operative vs non-operative measures. Patient wishes to proceed with a right shoulder arthroscopy.   o The above service was scribed by Bethany Saenz on my behalf and I attest to the accuracy of the note. avery  o Electronically Identified Patient Education Materials Provided Electronically            Electronically Signed by: Bethany Saenz-, Other -Author on May 19, 2021 09:21:46 AM  Electronically Co-signed by: Henri Quintero MD -Reviewer on May 22, 2021  04:11:11 PM

## 2021-06-14 NOTE — H&P
Progress Note      Patient Name: Sabi Haynes   Patient ID: [id number]   Sex: Female   YOB: 1940    Primary Care Provider: Drea Villalpando MD   Referring Provider: Drea Villalpando MD        Provider: Henri Quintero MD   Location: [facility]   Location Address: [address] [place] [address]  [id number]   Location Phone: [phone]          Chief Complaint  · Bilateral Knee Pain  · Right Shoulder Pain      History Of Present Illness  Sabi Haynes is a 80 year old /White female who presents today to [facility].      Patient presents today for an evaluation of bilateral knees and right shoulder. Patient had a fall 2 weeks resulting in bilateral knees and right shoulder pain. She has been having right shoulder pain since 2019. She states she couldn't proceed with right shoulder surgery earlier this year because she got sick. She was going to have a right shoulder arthroscopy. She had a right rotator cuff tear.       Past Medical History  Aftercare following Right knee arthroscopic partial medial meniscectomy; Aftercare following right medial meniscectomy; Allergy; Arthritis; Bladder disorder; Diabetes; Diabetes mellitus; Heart Attack; Heart Disease; Hemorrhoids; High cholesterol; Hyperlipemia; Hypertension; Kidney Disease; Kidney stone; Left hand pain; Limb Swelling; Memory loss/Forgetfulness; Migraine Headaches; Primary osteoarthritis of right knee; Reflux; Retinal Detachment, Recent, Total; Right Knee Medial Meniscus Tear; Ringing in ears; Sciatica, right; Seasonal allergies; SOB (shortness of breath); Stomach Disorder; Stroke; Thyroid disorder; Thyroid Problems; Ulcer         Past Surgical History  Artificial Joints/Limbs; Bladder Surg.; Carpal Tunnel Release; Cholecystectomy; Colonoscopy; Cyst Removal; Eye Implant; Gallbladder; Hip Replacement; Hysterectomy; Joint Surgery; Kidney; Knee surgery; Varicose Vein Ablation         Medication List  Alcohol Pads  "topical pads, medicated; alcohol swabs topical pads, medicated; Bactrim -160 mg oral tablet; Colace 100 mg oral capsule; FreeStyle Lite Meter miscellaneous kit; FreeStyle Lite Strips miscellaneous strip; Glucose Gel 40 % oral gel; Lantus Solostar U-100 Insulin 100 unit/mL (3 mL) subcutaneous insulin pen; levothyroxine 112 mcg oral tablet; Macrobid 100 mg oral capsule; Meter-Check miscellaneous solution; Pen Needle 31 gauge x 3/16\" miscellaneous needle; Precision Xtra Test miscellaneous strip; Prilosec 40 mg         Allergy List  NO KNOWN DRUG ALLERGIES; Actos; aspirin; Bydureon; Byetta; Celebrex; Cipro; Crestor; Darvocet-N 100; Demerol; digoxin; Dilaudid; glipizide; Januvia; Latex Exam Gloves; Levemir FlexTouch; Lipitor; metformin; morphine; Motrin; rifampin       Allergies Reconciled  Family Medical History  Stroke; Heart Disease; Cancer, Unspecified; Diabetes, unspecified type; Leukemia; Family history of certain chronic disabling diseases; arthritis; Osteoporosis         Reproductive History   4 Para 4 0 0 0 & Postmenopausal       Social History  Alcohol Use (Never); lives alone; Recreational Drug Use (Never); Retired.; Tobacco (Never);          Immunizations  Name Date Admin   Influenza Refused   [id number] Refused   Prevnar 13 Refused   Tdap 2017         Review of Systems  · Constitutional  o Denies  o : fever, chills, weight loss  · Cardiovascular  o Denies  o : chest pain, shortness of breath  · Gastrointestinal  o Denies  o : liver disease, heartburn, nausea, blood in stools  · Genitourinary  o Denies  o : painful urination, blood in urine  · Integument  o Denies  o : rash, itching  · Neurologic  o Denies  o : headache, weakness, loss of consciousness  · Musculoskeletal  o Denies  o : painful, swollen joints  · Psychiatric  o Denies  o : drug/alcohol addiction, anxiety, depression      Vitals  Date Time BP Position Site L\R Cuff Size HR RR TEMP (F) WT  HT  BMI kg/m2 BSA m2 O2 Sat FR " "L/min FiO2        05/17/2021 03:23 PM         184lbs 16oz 5'  6\" 29.86 1.98             Physical Examination  · Constitutional  o Appearance  o : well developed, well-nourished, no obvious deformities present  · Head and Face  o Head  o :   § Inspection  § : normocephalic  o Face  o :   § Inspection  § : no facial lesions  · Eyes  o Conjunctivae  o : conjunctivae normal  o Sclerae  o : sclerae white  · Ears, Nose, Mouth and Throat  o Ears  o :   § External Ears  § : appearance within normal limits  § Hearing  § : intact  o Nose  o :   § External Nose  § : appearance normal  · Neck  o Inspection/Palpation  o : normal appearance  o Range of Motion  o : full range of motion  · Respiratory  o Respiratory Effort  o : breathing unlabored  o Inspection of Chest  o : normal appearance  o Auscultation of Lungs  o : no audible wheezing or rales  · Cardiovascular  o Heart  o : regular rate  · Gastrointestinal  o Abdominal Examination  o : soft and non-tender  · Skin and Subcutaneous Tissue  o General Inspection  o : intact, no rashes  · Psychiatric  o General  o : Alert and oriented x3  o Judgement and Insight  o : judgment and insight intact  o Mood and Affect  o : mood normal, affect appropriate  · Right Shoulder  o Inspection  o : No skin discoloration or swelling. Near-full passive shoulder range of motion. Tender AC joint. Positive empty can. Positive impingement signs. Good tone of deltoid, biceps, triceps, wrist extensors, and wrist flexors. Neurovascular intact. Sensation grossly intact. IR to back pocket. Weakness with strength.   · Extremities  o Extremities  o : BILATERAL KNEES: No swelling, skin discoloration or atrophy. Skin intact. Negative Lachman. Negative posterior sag. Stable to valgus/varus stress. Good strength in quadriceps, hamstrings, dorsiflexors, and plantar flexors. Sensation grossly intact. Neurovascular intact. Non-tender medial and lateral joint line. Weight bearing. Non-antalgic gait. Full " flexion and extension.   · In Office Procedures  o View  o : LAT/SUNRISE/STANDING   o Site  o : bilateral, knee  o Indication  o : Bilateral Knee Pain  o Study  o : X-rays ordered, taken in the office, and reviewed today.  o Xray  o : Mild degenerative changes of bilateral knees. No fracture or dislocation.           Assessment  · Pain in both knees, unspecified chronicity       Pain in right knee     719.46/M25.561  Pain in left knee     719.46/M25.562  · Right shoulder pain, unspecified chronicity     719.41/M25.511  · Rotator cuff tear of right shoulder     840.4/M75.100      Plan  · Orders  o Knee (Left) [facility] ([id number]) - 719.46/M25.562 - 05/17/2021  o Knee (Right) [facility] ([id number]) - 719.46/M25.561 - 05/17/2021  · Medications  o Medications have been Reconciled  o Transition of Care or Provider Policy  · Instructions  o Dr. Quintero saw and examined the patient and agrees with plan.   o X-rays reviewed by Dr. Quintero.  o Reviewed the patient's Past Medical, Social, and Family history as well as the ROS at today's visit, no changes.  o Call or return if worsening symptoms.  o Discussed surgery.  o Risks/benefits discussed with patient including, but not limited to: infection, bleeding, neurovascular damage, re-rupture, aesthetic deformity, need for further surgery, and death.  o Surgery pamphlet given.  o Follow Up Post-op.  o Discussed treatment plans and diagnosis with the patient. Discussed operative vs non-operative measures. Patient wishes to proceed with a right shoulder arthroscopy.   o The above service was scribed by Bethany Saenz on my behalf and I attest to the accuracy of the note. avery  o Electronically Identified Patient Education Materials Provided Electronically

## 2021-06-15 RX ORDER — INSULIN GLARGINE 100 [IU]/ML
50 INJECTION, SOLUTION SUBCUTANEOUS EVERY EVENING
COMMUNITY
End: 2021-08-20 | Stop reason: SDUPTHER

## 2021-06-16 RX ORDER — CHOLECALCIFEROL (VITAMIN D3) 125 MCG
500 CAPSULE ORAL DAILY
COMMUNITY

## 2021-06-16 RX ORDER — NAPROXEN SODIUM 220 MG
220 TABLET ORAL 2 TIMES DAILY PRN
Status: ON HOLD | COMMUNITY
End: 2021-06-22

## 2021-06-16 RX ORDER — HYDROCODONE BITARTRATE AND ACETAMINOPHEN 7.5; 325 MG/1; MG/1
1 TABLET ORAL EVERY 8 HOURS PRN
COMMUNITY
End: 2021-06-30

## 2021-06-16 NOTE — PRE-PROCEDURE INSTRUCTIONS
Medication to take am of procedure:    SYNTHROID, AND HYDROCODONE IF NEEDED AM OF PROCEDURE    IMPORTANT INSTRUCTIONS - PRE-ADMISSION TESTING  1. DO NOT EAT OR CHEW anything after midnight the night before your procedure.    2. You may have CLEAR liquids up to ___2___ hours prior to ARRIVAL time.   3. Take the following medications the morning of your procedure with JUST A SIP OF WATER:  __________SYNTHROID, HYDROCODONE IF NEEDED_____________________________________________________________________________________________________________________________________________________________________________    4. DO NOT BRING your medications to the hospital with you, UNLESS something has changed since your PRE-Admission Testing appointment.  5. Hold all vitamins, supplements, and NSAIDS (Non- steroidal anti-inflammatory meds) for one week prior to surgery (you MAY take Tylenol or Acetaminophen).  6. If you are diabetic, check your blood sugar the morning of your procedure. If it is less than 70 or if you are feeling symptomatic, call the following number for further instructions: [phone]_.  7. Use your inhalers/nebulizers as usual, the morning of your procedure. BRING YOUR INHALERS with you.   8. Bring your CPAP or BIPAP to hospital, ONLY IF YOU WILL BE SPENDING THE NIGHT.   9. Make sure you have a ride home and have someone who will stay with you the day of your procedure after you go home.  10. If you have any questions, please call your Pre-Admission Testing Nurse, __NIKI______________ at [phone]___________.   11. Per anesthesia request, do not smoke for 24 hours before your procedure or as instructed by your surgeon  12. .  CHECK BS IF LESS THEN 70  CALL [phone]     13. BATHING INSTRUCTIONS GIVEN.

## 2021-06-17 ENCOUNTER — ANESTHESIA (OUTPATIENT)
Dept: PERIOP | Facility: HOSPITAL | Age: 81
End: 2021-06-17

## 2021-06-17 ENCOUNTER — ANESTHESIA EVENT (OUTPATIENT)
Dept: PERIOP | Facility: HOSPITAL | Age: 81
End: 2021-06-17

## 2021-06-17 ENCOUNTER — HOSPITAL ENCOUNTER (INPATIENT)
Facility: HOSPITAL | Age: 81
LOS: 5 days | End: 2021-06-22
Attending: ORTHOPAEDIC SURGERY | Admitting: INTERNAL MEDICINE

## 2021-06-17 DIAGNOSIS — N20.0 RENAL CALCULUS, LEFT: ICD-10-CM

## 2021-06-17 DIAGNOSIS — R26.2 DIFFICULTY IN WALKING: Primary | ICD-10-CM

## 2021-06-17 PROBLEM — Z98.890 S/P RIGHT ROTATOR CUFF REPAIR: Status: ACTIVE | Noted: 2021-06-17

## 2021-06-17 PROBLEM — M75.100 ROTATOR CUFF TEAR: Status: ACTIVE | Noted: 2021-06-17

## 2021-06-17 LAB
GLUCOSE BLDC GLUCOMTR-MCNC: 219 MG/DL (ref 70–130)
GLUCOSE BLDC GLUCOMTR-MCNC: 221 MG/DL (ref 70–130)
GLUCOSE BLDC GLUCOMTR-MCNC: 261 MG/DL (ref 70–130)
GLUCOSE BLDC GLUCOMTR-MCNC: 320 MG/DL (ref 70–130)

## 2021-06-17 PROCEDURE — 82962 GLUCOSE BLOOD TEST: CPT

## 2021-06-17 PROCEDURE — 99223 1ST HOSP IP/OBS HIGH 75: CPT | Performed by: INTERNAL MEDICINE

## 2021-06-17 PROCEDURE — 29824 SHO ARTHRS SRG DSTL CLAVICLC: CPT | Performed by: ORTHOPAEDIC SURGERY

## 2021-06-17 PROCEDURE — 23412 REPAIR ROTATOR CUFF CHRONIC: CPT | Performed by: ORTHOPAEDIC SURGERY

## 2021-06-17 PROCEDURE — 93005 ELECTROCARDIOGRAM TRACING: CPT | Performed by: ANESTHESIOLOGY

## 2021-06-17 PROCEDURE — 25010000002 FENTANYL CITRATE (PF) 50 MCG/ML SOLUTION: Performed by: NURSE ANESTHETIST, CERTIFIED REGISTERED

## 2021-06-17 PROCEDURE — 0LQ14ZZ REPAIR RIGHT SHOULDER TENDON, PERCUTANEOUS ENDOSCOPIC APPROACH: ICD-10-PCS | Performed by: ORTHOPAEDIC SURGERY

## 2021-06-17 PROCEDURE — L3670 SO ACRO/CLAV CAN WEB PRE OTS: HCPCS | Performed by: ORTHOPAEDIC SURGERY

## 2021-06-17 PROCEDURE — 25010000003 CEFAZOLIN IN DEXTROSE 2-4 GM/100ML-% SOLUTION: Performed by: ORTHOPAEDIC SURGERY

## 2021-06-17 PROCEDURE — C1713 ANCHOR/SCREW BN/BN,TIS/BN: HCPCS | Performed by: ORTHOPAEDIC SURGERY

## 2021-06-17 PROCEDURE — 25010000002 ONDANSETRON PER 1 MG: Performed by: NURSE ANESTHETIST, CERTIFIED REGISTERED

## 2021-06-17 PROCEDURE — 25010000002 MIDAZOLAM PER 1 MG: Performed by: ANESTHESIOLOGY

## 2021-06-17 PROCEDURE — 25010000002 DEXAMETHASONE PER 1 MG: Performed by: NURSE ANESTHETIST, CERTIFIED REGISTERED

## 2021-06-17 PROCEDURE — 25010000002 EPINEPHRINE PER 0.1 MG: Performed by: ORTHOPAEDIC SURGERY

## 2021-06-17 PROCEDURE — 25010000002 HYDROMORPHONE 1 MG/ML SOLUTION: Performed by: NURSE ANESTHETIST, CERTIFIED REGISTERED

## 2021-06-17 PROCEDURE — 25010000002 PROPOFOL 10 MG/ML EMULSION: Performed by: NURSE ANESTHETIST, CERTIFIED REGISTERED

## 2021-06-17 PROCEDURE — C1889 IMPLANT/INSERT DEVICE, NOC: HCPCS | Performed by: ORTHOPAEDIC SURGERY

## 2021-06-17 PROCEDURE — 0RNJ4ZZ RELEASE RIGHT SHOULDER JOINT, PERCUTANEOUS ENDOSCOPIC APPROACH: ICD-10-PCS | Performed by: ORTHOPAEDIC SURGERY

## 2021-06-17 PROCEDURE — 25010000002 ROPIVACAINE PER 1 MG: Performed by: ANESTHESIOLOGY

## 2021-06-17 PROCEDURE — 63710000001 INSULIN LISPRO (HUMAN) PER 5 UNITS: Performed by: INTERNAL MEDICINE

## 2021-06-17 PROCEDURE — 25010000002 ONDANSETRON PER 1 MG: Performed by: ORTHOPAEDIC SURGERY

## 2021-06-17 PROCEDURE — 25010000002 MIDAZOLAM PER 1MG: Performed by: ANESTHESIOLOGY

## 2021-06-17 PROCEDURE — 93010 ELECTROCARDIOGRAM REPORT: CPT | Performed by: INTERNAL MEDICINE

## 2021-06-17 DEVICE — SUT/ANCH BIOCOMP SWIVELOCK/C 4.75X19.1MM: Type: IMPLANTABLE DEVICE | Site: SHOULDER | Status: FUNCTIONAL

## 2021-06-17 DEVICE — SUT FW 2/0 38IN 1BLU 1BLK/WHT  AR7201: Type: IMPLANTABLE DEVICE | Site: SHOULDER | Status: FUNCTIONAL

## 2021-06-17 DEVICE — SUT/ANCH BIOCOMP CORKSCREW FUL/THRD NO2 FW W/NDL 5.5: Type: IMPLANTABLE DEVICE | Site: SHOULDER | Status: FUNCTIONAL

## 2021-06-17 RX ORDER — ROCURONIUM BROMIDE 10 MG/ML
INJECTION, SOLUTION INTRAVENOUS AS NEEDED
Status: DISCONTINUED | OUTPATIENT
Start: 2021-06-17 | End: 2021-06-17 | Stop reason: SURG

## 2021-06-17 RX ORDER — DEXAMETHASONE SODIUM PHOSPHATE 4 MG/ML
INJECTION, SOLUTION INTRA-ARTICULAR; INTRALESIONAL; INTRAMUSCULAR; INTRAVENOUS; SOFT TISSUE AS NEEDED
Status: DISCONTINUED | OUTPATIENT
Start: 2021-06-17 | End: 2021-06-17 | Stop reason: SURG

## 2021-06-17 RX ORDER — SODIUM CHLORIDE 0.9 % (FLUSH) 0.9 %
10 SYRINGE (ML) INJECTION AS NEEDED
Status: DISCONTINUED | OUTPATIENT
Start: 2021-06-17 | End: 2021-06-17

## 2021-06-17 RX ORDER — PROMETHAZINE HYDROCHLORIDE 25 MG/1
25 SUPPOSITORY RECTAL ONCE AS NEEDED
Status: DISCONTINUED | OUTPATIENT
Start: 2021-06-17 | End: 2021-06-17 | Stop reason: HOSPADM

## 2021-06-17 RX ORDER — SODIUM CHLORIDE 0.9 % (FLUSH) 0.9 %
10 SYRINGE (ML) INJECTION EVERY 12 HOURS SCHEDULED
Status: DISCONTINUED | OUTPATIENT
Start: 2021-06-17 | End: 2021-06-17

## 2021-06-17 RX ORDER — SUCCINYLCHOLINE/SOD CL,ISO/PF 100 MG/5ML
SYRINGE (ML) INTRAVENOUS AS NEEDED
Status: DISCONTINUED | OUTPATIENT
Start: 2021-06-17 | End: 2021-06-17 | Stop reason: SURG

## 2021-06-17 RX ORDER — LEVOTHYROXINE SODIUM 112 UG/1
112 TABLET ORAL
Status: DISCONTINUED | OUTPATIENT
Start: 2021-06-18 | End: 2021-06-22 | Stop reason: HOSPADM

## 2021-06-17 RX ORDER — PHENYLEPHRINE HCL IN 0.9% NACL 1 MG/10 ML
SYRINGE (ML) INTRAVENOUS AS NEEDED
Status: DISCONTINUED | OUTPATIENT
Start: 2021-06-17 | End: 2021-06-17 | Stop reason: SURG

## 2021-06-17 RX ORDER — ONDANSETRON 2 MG/ML
4 INJECTION INTRAMUSCULAR; INTRAVENOUS EVERY 6 HOURS PRN
Status: DISCONTINUED | OUTPATIENT
Start: 2021-06-17 | End: 2021-06-22 | Stop reason: HOSPADM

## 2021-06-17 RX ORDER — PROMETHAZINE HYDROCHLORIDE 12.5 MG/1
25 TABLET ORAL ONCE AS NEEDED
Status: DISCONTINUED | OUTPATIENT
Start: 2021-06-17 | End: 2021-06-17 | Stop reason: HOSPADM

## 2021-06-17 RX ORDER — MIDAZOLAM HYDROCHLORIDE 1 MG/ML
1 INJECTION INTRAMUSCULAR; INTRAVENOUS ONCE
Status: COMPLETED | OUTPATIENT
Start: 2021-06-17 | End: 2021-06-17

## 2021-06-17 RX ORDER — SODIUM CHLORIDE 0.9 % (FLUSH) 0.9 %
10 SYRINGE (ML) INJECTION AS NEEDED
Status: DISCONTINUED | OUTPATIENT
Start: 2021-06-17 | End: 2021-06-22 | Stop reason: HOSPADM

## 2021-06-17 RX ORDER — NICOTINE POLACRILEX 4 MG
15 LOZENGE BUCCAL
Status: DISCONTINUED | OUTPATIENT
Start: 2021-06-17 | End: 2021-06-22 | Stop reason: HOSPADM

## 2021-06-17 RX ORDER — SODIUM CHLORIDE 9 MG/ML
100 INJECTION, SOLUTION INTRAVENOUS CONTINUOUS
Status: DISCONTINUED | OUTPATIENT
Start: 2021-06-17 | End: 2021-06-18

## 2021-06-17 RX ORDER — FENTANYL CITRATE 50 UG/ML
INJECTION, SOLUTION INTRAMUSCULAR; INTRAVENOUS AS NEEDED
Status: DISCONTINUED | OUTPATIENT
Start: 2021-06-17 | End: 2021-06-17 | Stop reason: SURG

## 2021-06-17 RX ORDER — SODIUM CHLORIDE 0.9 % (FLUSH) 0.9 %
10 SYRINGE (ML) INJECTION EVERY 12 HOURS SCHEDULED
Status: DISCONTINUED | OUTPATIENT
Start: 2021-06-17 | End: 2021-06-22 | Stop reason: HOSPADM

## 2021-06-17 RX ORDER — ROPIVACAINE HYDROCHLORIDE 5 MG/ML
INJECTION, SOLUTION EPIDURAL; INFILTRATION; PERINEURAL
Status: COMPLETED | OUTPATIENT
Start: 2021-06-17 | End: 2021-06-17

## 2021-06-17 RX ORDER — ONDANSETRON 4 MG/1
4 TABLET, FILM COATED ORAL EVERY 6 HOURS PRN
Status: DISCONTINUED | OUTPATIENT
Start: 2021-06-17 | End: 2021-06-22 | Stop reason: HOSPADM

## 2021-06-17 RX ORDER — DEXTROSE MONOHYDRATE 100 MG/ML
25 INJECTION, SOLUTION INTRAVENOUS
Status: DISCONTINUED | OUTPATIENT
Start: 2021-06-17 | End: 2021-06-22 | Stop reason: HOSPADM

## 2021-06-17 RX ORDER — ACETAMINOPHEN 500 MG
1000 TABLET ORAL ONCE
Status: COMPLETED | OUTPATIENT
Start: 2021-06-17 | End: 2021-06-17

## 2021-06-17 RX ORDER — CEFAZOLIN SODIUM 2 G/100ML
2 INJECTION, SOLUTION INTRAVENOUS ONCE
Status: COMPLETED | OUTPATIENT
Start: 2021-06-17 | End: 2021-06-17

## 2021-06-17 RX ORDER — ONDANSETRON 2 MG/ML
4 INJECTION INTRAMUSCULAR; INTRAVENOUS ONCE AS NEEDED
Status: DISCONTINUED | OUTPATIENT
Start: 2021-06-17 | End: 2021-06-17 | Stop reason: HOSPADM

## 2021-06-17 RX ORDER — OXYCODONE HYDROCHLORIDE 5 MG/1
5 TABLET ORAL
Status: DISCONTINUED | OUTPATIENT
Start: 2021-06-17 | End: 2021-06-17 | Stop reason: HOSPADM

## 2021-06-17 RX ORDER — SODIUM CHLORIDE, SODIUM LACTATE, POTASSIUM CHLORIDE, CALCIUM CHLORIDE 600; 310; 30; 20 MG/100ML; MG/100ML; MG/100ML; MG/100ML
9 INJECTION, SOLUTION INTRAVENOUS CONTINUOUS PRN
Status: DISCONTINUED | OUTPATIENT
Start: 2021-06-17 | End: 2021-06-17

## 2021-06-17 RX ORDER — ONDANSETRON 2 MG/ML
INJECTION INTRAMUSCULAR; INTRAVENOUS AS NEEDED
Status: DISCONTINUED | OUTPATIENT
Start: 2021-06-17 | End: 2021-06-17 | Stop reason: SURG

## 2021-06-17 RX ORDER — MIDAZOLAM HYDROCHLORIDE 1 MG/ML
1 INJECTION INTRAMUSCULAR; INTRAVENOUS ONCE
Status: DISCONTINUED | OUTPATIENT
Start: 2021-06-17 | End: 2021-06-17

## 2021-06-17 RX ORDER — GLYCOPYRROLATE 0.2 MG/ML
0.2 INJECTION INTRAMUSCULAR; INTRAVENOUS
Status: COMPLETED | OUTPATIENT
Start: 2021-06-17 | End: 2021-06-17

## 2021-06-17 RX ORDER — LIDOCAINE HYDROCHLORIDE 20 MG/ML
INJECTION, SOLUTION INFILTRATION; PERINEURAL AS NEEDED
Status: DISCONTINUED | OUTPATIENT
Start: 2021-06-17 | End: 2021-06-17 | Stop reason: SURG

## 2021-06-17 RX ORDER — PROPOFOL 10 MG/ML
VIAL (ML) INTRAVENOUS AS NEEDED
Status: DISCONTINUED | OUTPATIENT
Start: 2021-06-17 | End: 2021-06-17 | Stop reason: SURG

## 2021-06-17 RX ADMIN — MIDAZOLAM HYDROCHLORIDE 1 MG: 1 INJECTION, SOLUTION INTRAMUSCULAR; INTRAVENOUS at 10:11

## 2021-06-17 RX ADMIN — Medication 100 MCG: at 11:40

## 2021-06-17 RX ADMIN — SODIUM CHLORIDE 100 ML/HR: 9 INJECTION, SOLUTION INTRAVENOUS at 18:42

## 2021-06-17 RX ADMIN — Medication 100 MCG: at 11:10

## 2021-06-17 RX ADMIN — FENTANYL CITRATE 50 MCG: 50 INJECTION INTRAMUSCULAR; INTRAVENOUS at 10:57

## 2021-06-17 RX ADMIN — ONDANSETRON 4 MG: 2 INJECTION INTRAMUSCULAR; INTRAVENOUS at 11:43

## 2021-06-17 RX ADMIN — INSULIN LISPRO 6 UNITS: 100 INJECTION, SOLUTION INTRAVENOUS; SUBCUTANEOUS at 19:48

## 2021-06-17 RX ADMIN — PROPOFOL 150 MG: 10 INJECTION, EMULSION INTRAVENOUS at 10:58

## 2021-06-17 RX ADMIN — Medication 80 MG: at 10:58

## 2021-06-17 RX ADMIN — ACETAMINOPHEN 1000 MG: 500 TABLET ORAL at 09:55

## 2021-06-17 RX ADMIN — MIDAZOLAM HYDROCHLORIDE 1 MG: 1 INJECTION, SOLUTION INTRAMUSCULAR; INTRAVENOUS at 10:13

## 2021-06-17 RX ADMIN — GLYCOPYRROLATE 0.2 MG: 0.2 INJECTION INTRAMUSCULAR; INTRAVENOUS at 10:05

## 2021-06-17 RX ADMIN — ROPIVACAINE HYDROCHLORIDE 20 ML: 5 INJECTION, SOLUTION EPIDURAL; INFILTRATION; PERINEURAL at 10:18

## 2021-06-17 RX ADMIN — SODIUM CHLORIDE, POTASSIUM CHLORIDE, SODIUM LACTATE AND CALCIUM CHLORIDE: 600; 310; 30; 20 INJECTION, SOLUTION INTRAVENOUS at 11:44

## 2021-06-17 RX ADMIN — DEXAMETHASONE SODIUM PHOSPHATE 4 MG: 4 INJECTION INTRA-ARTICULAR; INTRALESIONAL; INTRAMUSCULAR; INTRAVENOUS; SOFT TISSUE at 10:58

## 2021-06-17 RX ADMIN — SODIUM CHLORIDE, POTASSIUM CHLORIDE, SODIUM LACTATE AND CALCIUM CHLORIDE 9 ML/HR: 600; 310; 30; 20 INJECTION, SOLUTION INTRAVENOUS at 09:55

## 2021-06-17 RX ADMIN — HYDROMORPHONE HYDROCHLORIDE 0.5 MG: 1 INJECTION, SOLUTION INTRAMUSCULAR; INTRAVENOUS; SUBCUTANEOUS at 12:31

## 2021-06-17 RX ADMIN — OXYCODONE HYDROCHLORIDE 5 MG: 5 TABLET ORAL at 12:35

## 2021-06-17 RX ADMIN — CEFAZOLIN SODIUM 2 G: 2 INJECTION, SOLUTION INTRAVENOUS at 10:55

## 2021-06-17 RX ADMIN — LIDOCAINE HYDROCHLORIDE 100 MG: 20 INJECTION, SOLUTION INFILTRATION; PERINEURAL at 10:58

## 2021-06-17 RX ADMIN — HYDROMORPHONE HYDROCHLORIDE 0.5 MG: 1 INJECTION, SOLUTION INTRAMUSCULAR; INTRAVENOUS; SUBCUTANEOUS at 12:42

## 2021-06-17 RX ADMIN — ROCURONIUM BROMIDE 5 MG: 10 INJECTION INTRAVENOUS at 10:58

## 2021-06-17 RX ADMIN — ONDANSETRON 4 MG: 2 INJECTION INTRAMUSCULAR; INTRAVENOUS at 18:38

## 2021-06-17 RX ADMIN — ROCURONIUM BROMIDE 45 MG: 10 INJECTION INTRAVENOUS at 11:21

## 2021-06-17 NOTE — ANESTHESIA PREPROCEDURE EVALUATION
Anesthesia Evaluation     Patient summary reviewed and Nursing notes reviewed   no history of anesthetic complications:  NPO Solid Status: > 8 hours  NPO Liquid Status: > 2 hours           Airway   Mallampati: II  TM distance: >3 FB  Neck ROM: full  No difficulty expected  Dental    (+) edentulous    Pulmonary - normal exam    breath sounds clear to auscultation  (+) shortness of breath,   Cardiovascular - normal exam  Exercise tolerance: poor (<4 METS)    ECG reviewed  Rhythm: regular    (+) past MI , dysrhythmias (RBBB), hyperlipidemia,     ROS comment: Neg stress echo 10/2020  EF 60%, mild LVH    Neuro/Psych  (+) CVA,     GI/Hepatic/Renal/Endo    (+)  GERD, PUD,  diabetes mellitus (does not check BS at home, today 221) type 2 poorly controlled,     Musculoskeletal     Back pain: self cath 2 spinal stenosis.  Abdominal    Substance History - negative use     OB/GYN negative ob/gyn ROS         Other - negative ROS                       Anesthesia Plan    ASA 3     general and regional   (Patient understands anesthesia not responsible for dental damage. Regional anesthesia options discussed with patient. Pt accepts ISB regional block.)  intravenous induction     Anesthetic plan, all risks, benefits, and alternatives have been provided, discussed and informed consent has been obtained with: patient.

## 2021-06-17 NOTE — ANESTHESIA POSTPROCEDURE EVALUATION
Patient: Sabi Haynes    Procedure Summary     Date: 06/17/21 Room / Location: [facility]  / [facility]    Anesthesia Start: 1055 Anesthesia Stop: 1202    Procedure: RIGHT SHOULDER ARTHROSCOPY, SUBACROMIAL DECOMPRESSION, DISTAL CLAVICULECTOMY, ROTATOR CUFF REPAIR (Right Shoulder) Diagnosis: (RIGHT SHOULDER ROTATOR CUFF TEAR)    Surgeons: Henri Quintero MD Provider: Man Brantley MD    Anesthesia Type: general, regional ASA Status: 3          Anesthesia Type: general, regional    Vitals  Vitals Value Taken Time   /72 06/17/21 1231   Temp 36.1 °C (97 °F) 06/17/21 1203   Pulse 80 06/17/21 1234   Resp 20 06/17/21 1209   SpO2 96 % 06/17/21 1234   Vitals shown include unvalidated device data.        Post Anesthesia Care and Evaluation    Patient location during evaluation: bedside  Patient participation: complete - patient participated  Level of consciousness: awake  Pain management: adequate  Airway patency: patent  Anesthetic complications: No anesthetic complications  PONV Status: none  Cardiovascular status: acceptable and stable  Respiratory status: acceptable  Hydration status: acceptable

## 2021-06-17 NOTE — OP NOTE
SHOULDER ARTHROSCOPY WITH ROTATOR CUFF REPAIR  Procedure Report    Patient Name:  Sabi Haynes  YOB: 1940    Date of Surgery:  6/17/2021       Pre-op Diagnosis:   RIGHT SHOULDER ROTATOR CUFF TEAR   Right shoulder impingement  Right shoulder AC joint arthrosis         Post-Op Diagnosis Codes:  Same    Procedure/CPT® Codes:      Procedure(s):  RIGHT SHOULDER ARTHROSCOPY, SUBACROMIAL DECOMPRESSION, DISTAL CLAVICULECTOMY, ROTATOR CUFF REPAIR    Staff:  Surgeon(s):  Henri Quintero MD    Assistant: Choco Bacon    Anesthesia: General    Estimated Blood Loss: none    Implants:    Implant Name Type Inv. Item Serial No.  Lot No. LRB No. Used Action   SUT/ANCH BIOCOMP CORKSCREW FUL/THRD NO2 FW W/NDL 5.5 - [id number] Implant SUT/ANCH BIOCOMP CORKSCREW FUL/THRD NO2 FW W/NDL 5.5  [id number] Right 1 Implanted   SUT FW 2/0 38IN 1BLU 1BLK/WHT  [id number] - [id number] Implant SUT FW 2/0 38IN 1BLU 1BLK/WHT  [id number]  [employer] [id number] Right 1 Implanted   SUT/ANCH BIOCOMP SWIVELOCK/C 4.75X19.1MM - [id number] Implant SUT/ANCH BIOCOMP SWIVELOCK/C 4.75X19.1MM  [employer] [id number] Right 1 Implanted       Specimen:          None      Complications: None    Description of Procedure:   The patient was taken to the operating room and placed supine on the operating table after interscalene block was done in preoperative holding. After general endotracheal anesthesia was established the shoulder was examined and there was full range of motion and no instability. The patient was placed in the left lateral decubitus position with axillary roll and well-padded down lower extremity on a beanbag. The beanbag was deflated, and the right shoulder and upper extremity were prepped and draped in standard fashion using alcohol and ChloraPrep. A standard posterolateral portal was established with a stab incision. The blunt was entered into the glenohumeral joint atraumatically and an anterosuperior portal was established under  direct visualization. Thorough examination of the shoulder ensued. The glenohumeral cartilage was well maintained. The biceps was intact and the labrum was intact. There were no loose bodies or synovitis. There was no significant chondromalacia of the glenohumeral joint. There was evidence of a full thickness supraspinatus rotator cuff tear. The camera was placed in the subacromial space. The bursectomy was completed through a lateral portal. The coracoacromial ligament was frayed and released with a VAPR wand. The undersurface of the acromion was prepared for acromioplasty using a VAPR wand and carried out using a bur and checked using the cutting block technique. The distal clavicle showed significant signs of AC arthrosis and 8-10 mm of bone was removed from the distal clavicle using a bur. The bed of the rotator cuff was prepared and debrided with a shaver. After preparing the rotator cuff bed for repair, the mini-open rotator cuff repair ensued using one bioabsorbable Arthrex anchor with double limb FiberWire and a convergent suture. The suture was passed with a scorpion needle through the rotator cuff in an alternating simple and horizontal mattress fashion and tied down and incorporated to a double row repair with a swivel lock. A stout repair of the rotator cuff was achieved. The wound was copiously irrigated. The deltoid was closed with a few figure-of-eight 0 Vicryl, deep fat with 0 Vicryl, subcutaneous with 2-0 Vicryl, and the skin was closed with nylon. Incisions were washed and dried. Sterile dressings were applied. The patient was placed in an abduction pillow and sling.       The patient tolerated the procedure well, was extubated and taken to the recovery room.           Henri Quintero MD     Date: 6/17/2021  Time: 12:07 EDT

## 2021-06-17 NOTE — ANESTHESIA PROCEDURE NOTES
Peripheral Block      Patient reassessed immediately prior to procedure    Patient location during procedure: pre-op  Start time: 6/17/2021 10:05 AM  Stop time: 6/17/2021 10:16 AM  Reason for block: at surgeon's request and post-op pain management  Preanesthetic Checklist  Completed: patient identified, IV checked, site marked, risks and benefits discussed, surgical consent, monitors and equipment checked, pre-op evaluation and timeout performed  Prep:  Pt Position: supine (HOB elevated)  Sterile barriers:cap, washed/disinfected hands, sterile barriers, gloves, mask, partial drape and alcohol skin prep  Prep: ChloraPrep  Patient monitoring: blood pressure monitoring, continuous pulse oximetry and EKG  Procedure  Sedation:yes  Performed under: local infiltration  Guidance:ultrasound guided and nerve stimulator  ULTRASOUND INTERPRETATION.  Using ultrasound guidance a 22 G gauge needle was placed in close proximity to the brachial plexus nerve, at which point, under ultrasound guidance anesthetic was injected in the area of the nerve and spread of the anesthesia was seen on ultrasound in close proximity thereto.  There were no abnormalities seen on ultrasound; a digital image was taken; and the patient tolerated the procedure with no complications. Images:still images obtained, printed/placed on chart    Laterality:right  Block Type:interscalene  Injection Technique:single-shot  Needle Type:echogenic  Needle Gauge:22 G (2in)  Resistance on Injection: none    Medications Used: ropivacaine (NAROPIN) 0.5 % injection, 20 mL      Medications  Comment:ropi 1:200k    Post Assessment  Injection Assessment: negative aspiration for heme, no paresthesia on injection and incremental injection  Patient Tolerance:comfortable throughout block  Complications:no  Additional Notes  The block or continuous infusion is requested by the referring physician for management of postoperative pain, or pain related to a procedure. Ultrasound  guidance (deemed medically necessary). Painless injection, pt was awake and conversant during the procedure without complications. Needle and surrounding structures visualized throughout procedure. No adverse reactions or complications seen during this period. Post-procedure image showed no signs of complication, and anatomy was consistent with an uncomplicated nerve blockade.

## 2021-06-17 NOTE — H&P
[facility]  [facility]   Patient Name: Sabi Haynes  : 1940  MRN: [id number]  Primary Care Physician:  Drea Villalpando MD  Date of admission: 2021    Subjective   Subjective     Chief Complaint:   Right shoulder pain    HPI:    Sabi Haynes is a 80 y.o. female with past medical history significant for diabetes, hypothyroidism.  She has been dealing with right shoulder pain for 3 years now, has been receiving injections with  Being for some time.  However as these injections were no longer providing optimal relief, decision was made for patient to have right shoulder arthroplasty.  Patient tolerated the procedure well with no intra nor postoperative complications.  On my exam patient complaining of dry tongue, shoulder currently numb.  Patient has history of home self catheterizations, ureteral stenosis.  Patient states she self caths herself 4-5 times per day, following her procedure this would not be possible at home alone.  Patient states she is looking for SNF placement as she recovers.      Personal History     Past Medical History:  Diabetes  Hypothyroidism    Past Surgical History:  Cholecystectomy  Hysterectomy  Vein stripping  Bilateral hip replacements  Left knee replacement    Family History:   Patient's mother had diabetes  Father had leukemia    Social History:   Patient is a non-smoker, nondrinker, no illicit drug use  Patient lives at home alone    Home Medications:  HYDROcodone-acetaminophen, Insulin Glargine, levothyroxine, naproxen sodium, vitamin B-12, and vitamin D3    Allergies:  Allergies   Allergen Reactions   • Atorvastatin Other (See Comments)     BONE PAIN   • Aspirin Other (See Comments), GI Intolerance and Unknown - Low Severity     \"MAKES ME LIGHTHEADED\"   • Ibuprofen Other (See Comments)     HISTORY OF GI ULCERS   • Insulin Detemir Hives   • Morphine Other (See Comments)     \"HARD TIME WAKING UP\"   • Atenolol Other (See " "Comments)     sleepy   • Digoxin Other (See Comments)     \"MAKES ME FALL ASLEEP\"   • Latex Rash   • Meperidine Other (See Comments)     \"HARD TIME WAKING UP\"   • Metformin Diarrhea       Review of Systems   10 point review of systems completed, negative unless mentioned above    Objective   Objective     Vitals:   Temp:  [97 °F (36.1 °C)-97.8 °F (36.6 °C)] 97.8 °F (36.6 °C)  Heart Rate:  [50-91] 52  Resp:  [15-22] 16  BP: (109-177)/(49-78) 134/61  Flow (L/min):  [2-6] 6    Physical Exam    Constitutional: Awake, alert, no acute distress   Eyes: Pupils equal, sclerae anicteric, no conjunctival injection   HENT: NCAT, mucous membranes dry   Respiratory: Clear to auscultation bilaterally, nonlabored respirations    Cardiovascular: RRR, no murmurs, rubs, or gallops, palpable pedal pulses bilaterally   Gastrointestinal: Positive bowel sounds, soft, nontender, nondistended   Musculoskeletal: No bilateral ankle edema, patient's right upper extremity in surgical brace, able to move hand, states hand feels numb   Psychiatric: Appropriate affect, cooperative   Neurologic: Oriented x 3, strength symmetric in all extremities, Cranial Nerves grossly intact to confrontation, speech clear      Result Review    Result Review:  I have personally reviewed the results from the time of this admission to 6/17/2021 17:42 EDT and agree with these findings:  [x]  Laboratory  []  Microbiology  [x]  Radiology  []  EKG/Telemetry   []  Cardiology/Vascular   []  Pathology  [x]  Old records  []  Other:      Assessment/Plan   Assessment / Plan     Assessment/Plan:   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  Diabetes on insulin  Chronic urinary retention, urethral stenosis  Hypothyroidism    Plan:  Patient has been admitted to the hospital for further care management  Orthopedic surgery consulted, appreciate assistance  Patient's home medications resumed as indicated  Sliding scale insulin with hypoglycemia protocol  We will start " long-acting insulin in the morning once patient is awake and eating more reliably  Order for straight cath and bladder scan has been ordered  PT OT has been consulted  Will discuss with social work in the morning, patient is hoping for SNF discharge      DVT prophylaxis:  Mechanical DVT prophylaxis orders are present.    CODE STATUS:    Code Status: CPR  Medical Interventions (Level of Support Prior to Arrest): Full    Electronically signed by Madhu Lilly MD, 06/17/21, 5:42 PM EDT.

## 2021-06-18 LAB
ANION GAP SERPL CALCULATED.3IONS-SCNC: 5.6 MMOL/L (ref 5–15)
BUN SERPL-MCNC: 19 MG/DL (ref 8–23)
BUN/CREAT SERPL: 19.4 (ref 7–25)
CALCIUM SPEC-SCNC: 8.5 MG/DL (ref 8.6–10.5)
CHLORIDE SERPL-SCNC: 106 MMOL/L (ref 98–107)
CO2 SERPL-SCNC: 21.4 MMOL/L (ref 22–29)
CREAT SERPL-MCNC: 0.98 MG/DL (ref 0.57–1)
DEPRECATED RDW RBC AUTO: 45.4 FL (ref 37–54)
ERYTHROCYTE [DISTWIDTH] IN BLOOD BY AUTOMATED COUNT: 13.6 % (ref 12.3–15.4)
GFR SERPL CREATININE-BSD FRML MDRD: 55 ML/MIN/1.73
GLUCOSE BLDC GLUCOMTR-MCNC: 245 MG/DL (ref 70–130)
GLUCOSE BLDC GLUCOMTR-MCNC: 246 MG/DL (ref 70–130)
GLUCOSE BLDC GLUCOMTR-MCNC: 254 MG/DL (ref 70–130)
GLUCOSE BLDC GLUCOMTR-MCNC: 273 MG/DL (ref 70–130)
GLUCOSE SERPL-MCNC: 272 MG/DL (ref 65–99)
HCT VFR BLD AUTO: 39.1 % (ref 34–46.6)
HGB BLD-MCNC: 12.5 G/DL (ref 12–15.9)
MAGNESIUM SERPL-MCNC: 1.8 MG/DL (ref 1.6–2.4)
MCH RBC QN AUTO: 29.2 PG (ref 26.6–33)
MCHC RBC AUTO-ENTMCNC: 32 G/DL (ref 31.5–35.7)
MCV RBC AUTO: 91.4 FL (ref 79–97)
PLATELET # BLD AUTO: 198 10*3/MM3 (ref 140–450)
PMV BLD AUTO: 11 FL (ref 6–12)
POTASSIUM SERPL-SCNC: 4.3 MMOL/L (ref 3.5–5.2)
QT INTERVAL: 437 MS
RBC # BLD AUTO: 4.28 10*6/MM3 (ref 3.77–5.28)
SODIUM SERPL-SCNC: 133 MMOL/L (ref 136–145)
WBC # BLD AUTO: 9.94 10*3/MM3 (ref 3.4–10.8)

## 2021-06-18 PROCEDURE — 94799 UNLISTED PULMONARY SVC/PX: CPT

## 2021-06-18 PROCEDURE — 63710000001 INSULIN DETEMIR PER 5 UNITS: Performed by: INTERNAL MEDICINE

## 2021-06-18 PROCEDURE — 99233 SBSQ HOSP IP/OBS HIGH 50: CPT | Performed by: INTERNAL MEDICINE

## 2021-06-18 PROCEDURE — 97161 PT EVAL LOW COMPLEX 20 MIN: CPT

## 2021-06-18 PROCEDURE — 51701 INSERT BLADDER CATHETER: CPT

## 2021-06-18 PROCEDURE — 63710000001 INSULIN LISPRO (HUMAN) PER 5 UNITS: Performed by: INTERNAL MEDICINE

## 2021-06-18 PROCEDURE — 51702 INSERT TEMP BLADDER CATH: CPT

## 2021-06-18 PROCEDURE — 82962 GLUCOSE BLOOD TEST: CPT

## 2021-06-18 PROCEDURE — 85027 COMPLETE CBC AUTOMATED: CPT | Performed by: INTERNAL MEDICINE

## 2021-06-18 PROCEDURE — 80048 BASIC METABOLIC PNL TOTAL CA: CPT | Performed by: INTERNAL MEDICINE

## 2021-06-18 PROCEDURE — 83735 ASSAY OF MAGNESIUM: CPT | Performed by: INTERNAL MEDICINE

## 2021-06-18 RX ORDER — HYDROCODONE BITARTRATE AND ACETAMINOPHEN 7.5; 325 MG/1; MG/1
2 TABLET ORAL EVERY 4 HOURS PRN
Status: DISCONTINUED | OUTPATIENT
Start: 2021-06-18 | End: 2021-06-22 | Stop reason: HOSPADM

## 2021-06-18 RX ORDER — HYDROCODONE BITARTRATE AND ACETAMINOPHEN 7.5; 325 MG/1; MG/1
1 TABLET ORAL EVERY 6 HOURS PRN
Status: DISCONTINUED | OUTPATIENT
Start: 2021-06-18 | End: 2021-06-22 | Stop reason: HOSPADM

## 2021-06-18 RX ORDER — HYDROCODONE BITARTRATE AND ACETAMINOPHEN 5; 325 MG/1; MG/1
1 TABLET ORAL EVERY 4 HOURS PRN
Status: DISCONTINUED | OUTPATIENT
Start: 2021-06-18 | End: 2021-06-18

## 2021-06-18 RX ORDER — HYDROCODONE BITARTRATE AND ACETAMINOPHEN 7.5; 325 MG/1; MG/1
2 TABLET ORAL EVERY 4 HOURS PRN
Status: DISCONTINUED | OUTPATIENT
Start: 2021-06-18 | End: 2021-06-18 | Stop reason: SDUPTHER

## 2021-06-18 RX ORDER — HYDROCODONE BITARTRATE AND ACETAMINOPHEN 5; 325 MG/1; MG/1
1 TABLET ORAL EVERY 6 HOURS PRN
Status: DISCONTINUED | OUTPATIENT
Start: 2021-06-18 | End: 2021-06-18

## 2021-06-18 RX ADMIN — INSULIN DETEMIR 20 UNITS: 100 INJECTION, SOLUTION SUBCUTANEOUS at 08:35

## 2021-06-18 RX ADMIN — INSULIN LISPRO 4 UNITS: 100 INJECTION, SOLUTION INTRAVENOUS; SUBCUTANEOUS at 13:20

## 2021-06-18 RX ADMIN — HYDROCODONE BITARTRATE AND ACETAMINOPHEN 1 TABLET: 5; 325 TABLET ORAL at 09:54

## 2021-06-18 RX ADMIN — SODIUM CHLORIDE 100 ML/HR: 9 INJECTION, SOLUTION INTRAVENOUS at 04:37

## 2021-06-18 RX ADMIN — LEVOTHYROXINE SODIUM 112 MCG: 112 TABLET ORAL at 06:27

## 2021-06-18 RX ADMIN — HYDROCODONE BITARTRATE AND ACETAMINOPHEN 1 TABLET: 5; 325 TABLET ORAL at 15:07

## 2021-06-18 RX ADMIN — INSULIN DETEMIR 20 UNITS: 100 INJECTION, SOLUTION SUBCUTANEOUS at 21:15

## 2021-06-18 RX ADMIN — INSULIN LISPRO 6 UNITS: 100 INJECTION, SOLUTION INTRAVENOUS; SUBCUTANEOUS at 08:35

## 2021-06-18 RX ADMIN — HYDROCODONE BITARTRATE AND ACETAMINOPHEN 1 TABLET: 5; 325 TABLET ORAL at 21:04

## 2021-06-18 RX ADMIN — HYDROCODONE BITARTRATE AND ACETAMINOPHEN 2 TABLET: 7.5; 325 TABLET ORAL at 22:43

## 2021-06-18 RX ADMIN — INSULIN LISPRO 6 UNITS: 100 INJECTION, SOLUTION INTRAVENOUS; SUBCUTANEOUS at 18:33

## 2021-06-18 NOTE — PROGRESS NOTES
[facility]   Hospitalist Progress Note  Date: 2021  Patient Name: Sabi Haynes  : 1940  MRN: [id number]  Date of admission: 2021      Subjective   Subjective     Chief Complaint:   Right shoulder pain     Summary:   Sabi Haynes is an 80 y.o. female with past medical history significant for diabetes, hypothyroidism.  She has been dealing with right shoulder pain for 3 years now, has been receiving injections with Dr. Quintero for some time.  However as these injections were no longer providing optimal relief, decision was made for patient to have right shoulder arthroplasty.  Patient tolerated the procedure well with no intra nor postoperative complications.  Patient has history of home self catheterizations 2/2 ureteral stenosis.  Patient states she self caths herself 4-5 times per day, following her procedure this would not be possible at home alone.  Patient states she is looking for SNF placement as she recovers.    Interval Followup:   Patient had a Horvath catheter placed last night.  As patient self caths at home it would be best if we continued with self catheterizations.  For, order to discontinue Horvath as it is an infection risk has been placed.  Patient still in pain therefore increasing frequency of her Norco    Review of Systems   All systems were reviewed and negative except for: Right shoulder pain    Objective   Objective     Vitals:   Temp:  [96.5 °F (35.8 °C)-97.9 °F (36.6 °C)] 97.9 °F (36.6 °C)  Heart Rate:  [50-86] 64  Resp:  [16-18] 18  BP: (121-156)/(49-81) 156/51  Flow (L/min):  [2] 2  Physical Exam               Constitutional: Awake, alert, no acute distress              Eyes: Pupils equal, sclerae anicteric, no conjunctival injection              HENT: NCAT, mucous membranes dry              Respiratory: Clear to auscultation bilaterally, nonlabored respirations               Cardiovascular: RRR, no murmurs, rubs, or gallops, palpable pedal pulses bilaterally               Gastrointestinal: Positive bowel sounds, soft, nontender, nondistended              Musculoskeletal: No bilateral ankle edema, patient's right upper extremity in surgical brace, moving hand with no numbness or tingling in hand.  States she is having pain in right shoulder              Psychiatric: Appropriate affect, cooperative              Neurologic: Oriented x 3, strength symmetric in all extremities, Cranial Nerves grossly intact to confrontation, speech clear    Result Review    Result Review:  I have personally reviewed the results from the time of this admission to 6/18/2021 14:17 EDT and agree with these findings:  [x]  Laboratory  []  Microbiology  [x]  Radiology  []  EKG/Telemetry   []  Cardiology/Vascular   []  Pathology  [x]  Old records  []  Other:    Assessment/Plan   Assessment / Plan     Assessment/Plan:  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  Diabetes on insulin  Chronic urinary retention, urethral stenosis requires self cath at home  Hypothyroidism     Plan:  Patient has been admitted to the hospital for further care and management  Orthopedic surgery consulted, appreciate assistance  Patient's home medications resumed as indicated  Sliding scale insulin with hypoglycemia protocol  Starting patient with Levemir twice daily 20 units, will closely monitor and uptitrate as needed  Order for straight cath and bladder scan has been ordered, PATIENCE Horvath  PT OT has been consulted  Discussed with social work, as patient is unable to care for herself at home as she can no longer self cath, she is looking for placement at SNF, I was informed her case will be presented to [facility] and we will reassess following this    Discussed plan with RN     DVT prophylaxis:  Mechanical DVT prophylaxis orders are present.    CODE STATUS:   Code Status: CPR  Medical Interventions (Level of Support Prior to Arrest): Full        Electronically signed by Madhu Lilly MD, 06/18/21, 2:17 PM  EDT.

## 2021-06-18 NOTE — PLAN OF CARE
Goal Outcome Evaluation:  Plan of Care Reviewed With: patient           Outcome Summary: STRAIGHT CATH X2, MOFFETT INSERTED TO BSD. URINE CLEAR,  LIGHT YELLOW. NO C/O PAIN

## 2021-06-18 NOTE — THERAPY EVALUATION
Acute Care - Physical Therapy Initial Evaluation   [facility]     Patient Name: Sabi Haynes  : 1940  MRN: [id number]  Today's Date: 2021      Admit date: 2021     Referring Physician: Madhu Lilly MD     Visit diagnosis    ICD-10-CM ICD-9-CM   1. Difficulty in walking  R26.2 719.7        Active Hospital Problems    Diagnosis  POA   • Rotator cuff tear [M75.100]  Yes   • S/P right rotator cuff repair [Z98.890]  Not Applicable        SurgeryDate:2021   Procedure(s) (LRB):  RIGHT SHOULDER ARTHROSCOPY, SUBACROMIAL DECOMPRESSION, DISTAL CLAVICULECTOMY, ROTATOR CUFF REPAIR (Right)     Past Medical History:   Diagnosis Date   • Acid reflux    • Arthritis    • Bladder disorder    • Bladder paralysis     DUE TO SPINAL STENOSIS   • Blind spot scotoma, left    • Condition not found     ulcer   • DDD (degenerative disc disease), cervical    • DDD (degenerative disc disease), lumbar    • Diabetes mellitus (CMS/HCC)    • Gastric ulcer    • Hearing loss    • Heart attack (CMS/HCC)     DENIES CP BUT GETS SOA WITH EXERTION . OCCURRED IN THE 1970S. SAW DR BAUTISTA IN [place]   • Heart disease    • Hemorrhoids    • History of confusion    • History of migraine headaches    • Hyperlipemia    • Hyperlipidemia    • Hypothyroidism    • Incontinence of bowel     DUE TO SPINAL STENOSIS   • Incontinence of urine     DUE TO SPINAL STENOSIS   • Kidney disease    • Left hand pain    • Limb swelling    • Macular degeneration    • PONV (postoperative nausea and vomiting)    • Primary osteoarthritis of right knee 2018   • Recent total retinal detachment    • Renal calculi    • Right BBB/left ant fasc block    • Ringing in ears    • Rotator cuff tear 2021   • S/P arthroscopic partial medial meniscectomy 2017    right knee   • S/P medial meniscectomy of right knee 10/11/2017   • Seasonal allergies    • Self-catheterizes urinary bladder     DUE TO SPINAL STENOSIS   • Short-term memory loss     MILD   •  Shortness of breath    • Spinal stenosis    • Stroke (CMS/HCC)     no residual   • Thyroid disorder    • Vitamin D deficiency         Past Surgical History:   Procedure Laterality Date   • BLADDER SURGERY  2002   • CARPAL TUNNEL RELEASE     • CHOLECYSTECTOMY     • COLONOSCOPY     • CYST REMOVAL     • EXTRACORPOREAL SHOCK WAVE LITHOTRIPSY (ESWL) Left 9/13/2019    Procedure: EXTRACORPOREAL SHOCKWAVE LITHOTRIPSY;  Surgeon: Pipe Pat MD;  Location: [facility] [place], [place];  Service: Urology   • EYE SURGERY      CATARACT SURGERY   • GALLBLADDER SURGERY     • HYSTERECTOMY     • HYSTERECTOMY  1969   • JOINT REPLACEMENT      joint surgery   • KNEE SURGERY  08/22   • LAPAROSCOPIC TUBAL LIGATION     • OTHER SURGICAL HISTORY      Artificial joint/limbs   • SHOULDER ARTHROSCOPY W/ ROTATOR CUFF REPAIR Right 6/17/2021    Procedure: RIGHT SHOULDER ARTHROSCOPY, SUBACROMIAL DECOMPRESSION, DISTAL CLAVICULECTOMY, ROTATOR CUFF REPAIR;  Surgeon: Henri Quintero MD;  Location: [facility];  Service: Orthopedics;  Laterality: Right;   • TEAR DUCT SURGERY     • TOTAL HIP ARTHROPLASTY  2010    HAS HAD BOTH HIPS REPLACED   • VARICOSE VEIN SURGERY             PT Assessment (last 12 hours)      PT Evaluation and Treatment     Row Name 06/18/21 1243          Physical Therapy Time and Intention    Subjective Information  pain  -FLOR     Document Type  evaluation  -FLOR     Mode of Treatment  individual therapy  -FLOR     Patient Effort  good  -FLOR     Symptoms Noted During/After Treatment  none  -FLOR     Row Name 06/18/21 1243          General Information    Prior Level of Function  independent:  -FLOR     Equipment Currently Used at Home  none  -FLOR     Risks Reviewed  patient:  -FLOR     Benefits Reviewed  patient:  -FLOR     Row Name 06/18/21 1243          Living Environment    Current Living Arrangements  home/apartment/condo  -FLOR     Lives With  alone  -FLOR     Row Name 06/18/21 1243          Home Use of Assistive/Adaptive Equipment    Equipment  Currently Used at Home  none  -FLOR     Row Name 06/18/21 1243          Range of Motion (ROM)    Range of Motion  ROM is WFL  -FLOR     Row Name 06/18/21 1243          Strength (Manual Muscle Testing)    Strength (Manual Muscle Testing)  bilateral lower extremities;strength is WFL  -FLOR     Row Name 06/18/21 1243          Bed Mobility    Bed Mobility  supine-sit  -FLOR     Supine-Sit [place] (Bed Mobility)  minimum assist (75% patient effort)  -FLOR     Assistive Device (Bed Mobility)  head of bed elevated;bed rails  -FLOR     Row Name 06/18/21 1243          Transfers    Transfers  sit-stand transfer;stand-sit transfer  -FLOR     Sit-Stand [place] (Transfers)  contact guard  -FLOR     Stand-Sit [place] (Transfers)  contact guard  -FLOR     Row Name 06/18/21 1243          Sit-Stand Transfer    Assistive Device (Sit-Stand Transfers)  -- hand hold  -FLOR     Row Name 06/18/21 1243          Gait/Stairs (Locomotion)    Gait/Stairs Locomotion  gait/ambulation independence  -FLOR     [place] Level (Gait)  contact guard  -FLOR     Assistive Device (Gait)  other (see comments) 1 person hand hold  -FLOR     Distance in Feet (Gait)  100  -FLOR     Row Name 06/18/21 1243          Safety Issues, Functional Mobility    Safety Issues Affecting Function (Mobility)  ability to follow commands  -FLOR     Row Name 06/18/21 1243          Balance    Balance Assessment  sitting dynamic balance;sit to stand dynamic balance;standing dynamic balance  -FLOR     Dynamic Sitting Balance  WFL  -FLOR     Dynamic Standing Balance  mild impairment  -FLOR     Row Name             Wound Right shoulder    Wound - Properties Group Side: Right  -JW Location: shoulder  -JW    Retired Wound - Properties Group Side: Right  -JW Location: shoulder  -JW    Row Name 06/18/21 1243          Plan of Care Review    Plan of Care Reviewed With  patient  -FLOR     Outcome Summary  Patient presents with decreased strength, transfers and ambulation.  Skilled physical therapy services  will be required to address these mobility deficits.  -FLOR     Row Name 06/18/21 1243          Physical Therapy Goals    Bed Mobility Goal Selection (PT)  --  -FLOR     Transfer Goal Selection (PT)  transfer, PT goal 1  -FLOR     Gait Training Goal Selection (PT)  gait training, PT goal 1  -FLOR     Row Name 06/18/21 1243          Transfer Goal 1 (PT)    Activity/Assistive Device (Transfer Goal 1, PT)  transfers, all  -FLOR     [place] Level/Cues Needed (Transfer Goal 1, PT)  independent  -FLOR     Time Frame (Transfer Goal 1, PT)  long term goal (LTG);10 days  -FLOR     Row Name 06/18/21 1243          Gait Training Goal 1 (PT)    Activity/Assistive Device (Gait Training Goal 1, PT)  gait (walking locomotion)  -FLOR     Weber Level (Gait Training Goal 1, PT)  independent  -FLOR     Distance (Gait Training Goal 1, PT)  300  -FLOR     Time Frame (Gait Training Goal 1, PT)  long term goal (LTG);10 days  -FLOR     Row Name 06/18/21 1243          Therapy Assessment/Plan (PT)    Patient/Family Therapy Goals Statement (PT)  Patient wants to return home after a short rehab stay  -FLOR     Criteria for Skilled Interventions Met (PT)  skilled treatment is necessary  -FLOR     Predicted Duration of Therapy Intervention (PT)  10 days  -FLOR     Problem List (PT)  problems related to;balance;mobility  -FLOR     Activity Limitations Related to Problem List (PT)  unable to ambulate safely;unable to transfer safely  -FLOR     Row Name 06/18/21 1243          PT Evaluation Complexity    History, PT Evaluation Complexity  3 or more personal factors and/or comorbidities  -FLOR     Examination of Body Systems (PT Eval Complexity)  total of 4 or more elements  -FLOR     Clinical Presentation (PT Evaluation Complexity)  stable  -FLOR     Clinical Decision Making (PT Evaluation Complexity)  low complexity  -FLOR     Overall Complexity (PT Evaluation Complexity)  low complexity  -FLOR     Row Name 06/18/21 1243          Therapy Plan Review/Discharge Plan (PT)     Therapy Plan Review (PT)  evaluation/treatment results reviewed;patient  -FLOR       User Bacon  (r) = Recorded By, (t) = Taken By, (c) = Cosigned By    Initials Name Provider Type    Debbie Ye, RN Registered Nurse    Binu Feliz, PT Physical Therapist        Physical Therapy Education                 Title: PT OT SLP Therapies (In Progress)     Topic: Physical Therapy (Done)     Point: Mobility training (Done)     Learning Progress Summary           Patient Acceptance, E,TB, VU by FLOR at 6/18/2021 1259                   Point: Home exercise program (Done)     Learning Progress Summary           Patient Acceptance, E,TB, VU by FLOR at 6/18/2021 1259                   Point: Body mechanics (Done)     Learning Progress Summary           Patient Acceptance, E,TB, VU by FLOR at 6/18/2021 1259                   Point: Precautions (Done)     Learning Progress Summary           Patient Acceptance, E,TB, VU by FLOR at 6/18/2021 1259                               User Key     Initials Effective Dates Name Provider Type Discipline    FLOR 06/03/21 -  Binu Granados PT Physical Therapist PT              PT Recommendation and Plan  Anticipated Discharge Disposition (PT): skilled nursing facility, inpatient rehabilitation facility  Planned Therapy Interventions (PT): balance training, bed mobility training, gait training, transfer training  Therapy Frequency (PT): daily  Plan of Care Reviewed With: patient  Outcome Summary: Patient presents with decreased strength, transfers and ambulation.  Skilled physical therapy services will be required to address these mobility deficits.  Outcome Measures     Row Name 06/18/21 1200             How much help from another person do you currently need...    Turning from your back to your side while in flat bed without using bedrails?  3  -FLOR      Moving from lying on back to sitting on the side of a flat bed without bedrails?  3  -FLOR      Moving to and from a bed to a chair (including  a wheelchair)?  3  -FLOR      Standing up from a chair using your arms (e.g., wheelchair, bedside chair)?  3  -FLOR      Climbing 3-5 steps with a railing?  3  -FLOR      To walk in hospital room?  3  -FLOR      AM-PAC 6 Clicks Score (PT)  18  -FLOR         Functional Assessment    Outcome Measure Options  AM-PAC 6 Clicks Basic Mobility (PT)  -FLOR        User Key  (r) = Recorded By, (t) = Taken By, (c) = Cosigned By    Initials Name Provider Type    Binu Feliz, PT Physical Therapist           Time Calculation:   PT Charges     Row Name 06/18/21 1300             Time Calculation    PT Received On  06/18/21  -FLOR      PT Goal Re-Cert Due Date  06/28/21  -FLOR         Untimed Charges    PT Eval/Re-eval Minutes  35  -FLOR         Total Minutes    Untimed Charges Total Minutes  35  -FLOR       Total Minutes  35  -FLOR        User Key  (r) = Recorded By, (t) = Taken By, (c) = Cosigned By    Initials Name Provider Type    Binu Feliz, PT Physical Therapist        Therapy Charges for Today     Code Description Service Date Service Provider Modifiers Qty    [id number] HC PT EVAL LOW COMPLEXITY 3 6/18/2021 Binu Granados, PT GP 1          PT G-Codes  Outcome Measure Options: AM-PAC 6 Clicks Basic Mobility (PT)  AM-PAC 6 Clicks Score (PT): 18    Binu Granados PT  6/18/2021

## 2021-06-18 NOTE — PLAN OF CARE
Goal Outcome Evaluation:  Plan of Care Reviewed With: patient           Outcome Summary: Patient presents with decreased strength, transfers and ambulation.  Skilled physical therapy services will be required to address these mobility deficits.

## 2021-06-19 LAB
ANION GAP SERPL CALCULATED.3IONS-SCNC: 7.3 MMOL/L (ref 5–15)
BUN SERPL-MCNC: 18 MG/DL (ref 8–23)
BUN/CREAT SERPL: 19.8 (ref 7–25)
CALCIUM SPEC-SCNC: 9 MG/DL (ref 8.6–10.5)
CHLORIDE SERPL-SCNC: 104 MMOL/L (ref 98–107)
CO2 SERPL-SCNC: 26.7 MMOL/L (ref 22–29)
CREAT SERPL-MCNC: 0.91 MG/DL (ref 0.57–1)
DEPRECATED RDW RBC AUTO: 46.3 FL (ref 37–54)
ERYTHROCYTE [DISTWIDTH] IN BLOOD BY AUTOMATED COUNT: 14 % (ref 12.3–15.4)
GFR SERPL CREATININE-BSD FRML MDRD: 59 ML/MIN/1.73
GLUCOSE BLDC GLUCOMTR-MCNC: 190 MG/DL (ref 70–130)
GLUCOSE BLDC GLUCOMTR-MCNC: 192 MG/DL (ref 70–130)
GLUCOSE BLDC GLUCOMTR-MCNC: 232 MG/DL (ref 70–130)
GLUCOSE BLDC GLUCOMTR-MCNC: 251 MG/DL (ref 70–130)
GLUCOSE SERPL-MCNC: 225 MG/DL (ref 65–99)
HCT VFR BLD AUTO: 41.3 % (ref 34–46.6)
HGB BLD-MCNC: 13.4 G/DL (ref 12–15.9)
MCH RBC QN AUTO: 29.4 PG (ref 26.6–33)
MCHC RBC AUTO-ENTMCNC: 32.4 G/DL (ref 31.5–35.7)
MCV RBC AUTO: 90.6 FL (ref 79–97)
PLATELET # BLD AUTO: 202 10*3/MM3 (ref 140–450)
PMV BLD AUTO: 11.4 FL (ref 6–12)
POTASSIUM SERPL-SCNC: 3.8 MMOL/L (ref 3.5–5.2)
RBC # BLD AUTO: 4.56 10*6/MM3 (ref 3.77–5.28)
SODIUM SERPL-SCNC: 138 MMOL/L (ref 136–145)
WBC # BLD AUTO: 9.37 10*3/MM3 (ref 3.4–10.8)

## 2021-06-19 PROCEDURE — 97116 GAIT TRAINING THERAPY: CPT

## 2021-06-19 PROCEDURE — 63710000001 INSULIN LISPRO (HUMAN) PER 5 UNITS: Performed by: INTERNAL MEDICINE

## 2021-06-19 PROCEDURE — 99232 SBSQ HOSP IP/OBS MODERATE 35: CPT | Performed by: INTERNAL MEDICINE

## 2021-06-19 PROCEDURE — 85027 COMPLETE CBC AUTOMATED: CPT | Performed by: INTERNAL MEDICINE

## 2021-06-19 PROCEDURE — 63710000001 INSULIN DETEMIR PER 5 UNITS: Performed by: INTERNAL MEDICINE

## 2021-06-19 PROCEDURE — 94799 UNLISTED PULMONARY SVC/PX: CPT

## 2021-06-19 PROCEDURE — 80048 BASIC METABOLIC PNL TOTAL CA: CPT | Performed by: INTERNAL MEDICINE

## 2021-06-19 PROCEDURE — 82962 GLUCOSE BLOOD TEST: CPT

## 2021-06-19 PROCEDURE — 51702 INSERT TEMP BLADDER CATH: CPT

## 2021-06-19 RX ADMIN — LEVOTHYROXINE SODIUM 112 MCG: 112 TABLET ORAL at 06:35

## 2021-06-19 RX ADMIN — HYDROCODONE BITARTRATE AND ACETAMINOPHEN 1 TABLET: 7.5; 325 TABLET ORAL at 14:47

## 2021-06-19 RX ADMIN — INSULIN DETEMIR 25 UNITS: 100 INJECTION, SOLUTION SUBCUTANEOUS at 21:08

## 2021-06-19 RX ADMIN — HYDROCODONE BITARTRATE AND ACETAMINOPHEN 2 TABLET: 7.5; 325 TABLET ORAL at 21:08

## 2021-06-19 RX ADMIN — INSULIN LISPRO 2 UNITS: 100 INJECTION, SOLUTION INTRAVENOUS; SUBCUTANEOUS at 09:41

## 2021-06-19 RX ADMIN — INSULIN LISPRO 4 UNITS: 100 INJECTION, SOLUTION INTRAVENOUS; SUBCUTANEOUS at 17:54

## 2021-06-19 RX ADMIN — INSULIN DETEMIR 25 UNITS: 100 INJECTION, SOLUTION SUBCUTANEOUS at 09:42

## 2021-06-19 RX ADMIN — INSULIN LISPRO 4 UNITS: 100 INJECTION, SOLUTION INTRAVENOUS; SUBCUTANEOUS at 12:23

## 2021-06-19 NOTE — THERAPY TREATMENT NOTE
Acute Care - Physical Therapy Treatment Note   Garcia     Patient Name: Sabi Haynes  : 1940  MRN: [id number]  Today's Date: 2021           PT Assessment (last 12 hours)      PT Evaluation and Treatment     Row Name 21          Physical Therapy Time and Intention    Subjective Information  weakness  -RH     Document Type  therapy note (daily note)  -RH     Mode of Treatment  physical therapy  -RH     Patient Effort  good  -RH     Row Name 21          Pain Scale: Word Pre/Post-Treatment    Pain: Word Scale, Pretreatment  0 - no pain  -RH     Posttreatment Pain Rating  0 - no pain  -RH     Row Name 21          Bed Mobility    Supine-Sit [place] (Bed Mobility)  contact guard  -RH     Assistive Device (Bed Mobility)  bed rails  -     Row Name 21          Transfers    Transfers  sit-stand transfer;stand-sit transfer  -RH     Sit-Stand [place] (Transfers)  contact guard  -RH     Stand-Sit [place] (Transfers)  contact guard  -RH     Row Name 21          Gait/Stairs (Locomotion)    [place] Level (Gait)  contact guard  -RH     Distance in Feet (Gait)  200  -RH     [place] Level (Stairs)  contact guard  -RH     Handrail Location (Stairs)  left side (ascending);left side (descending)  -RH     Number of Steps (Stairs)  5  -RH     Ascending Technique (Stairs)  step-to-step  -RH     Descending Technique (Stairs)  step-to-step  -RH     Row Name             Wound Right shoulder    Wound - Properties Group Side: Right  -JW Location: shoulder  -JW    Retired Wound - Properties Group Side: Right  -JW Location: shoulder  -JW    Row Name 21          Progress Summary (PT)    Progress Toward Functional Goals (PT)  progress toward functional goals is good  -RH       User Key  (r) = Recorded By, (t) = Taken By, (c) = Cosigned By    Initials Name Provider Type    Debbie Ye, RN Registered Nurse    Mino Thao PTA  Physical Therapy Assistant        Physical Therapy Education                 Title: PT OT SLP Therapies (In Progress)     Topic: Physical Therapy (Done)     Point: Mobility training (Done)     Learning Progress Summary           Patient Acceptance, E,TB, VU by FLOR at 6/18/2021 1259                   Point: Home exercise program (Done)     Learning Progress Summary           Patient Acceptance, E,TB, VU by FLOR at 6/18/2021 1259                   Point: Body mechanics (Done)     Learning Progress Summary           Patient Acceptance, E,TB, VU by FLOR at 6/18/2021 1259                   Point: Precautions (Done)     Learning Progress Summary           Patient Acceptance, E,TB, VU by FLOR at 6/18/2021 1259                               User Key     Initials Effective Dates Name Provider Type Discipline    FLOR 06/03/21 -  Binu Granados, PT Physical Therapist PT              PT Recommendation and Plan     Progress Summary (PT)  Progress Toward Functional Goals (PT): progress toward functional goals is good  Outcome Measures     Row Name 06/19/21 1600 06/18/21 1200          How much help from another person do you currently need...    Turning from your back to your side while in flat bed without using bedrails?  3  -RH  3  -FLOR     Moving from lying on back to sitting on the side of a flat bed without bedrails?  3  -RH  3  -FLOR     Moving to and from a bed to a chair (including a wheelchair)?  3  -RH  3  -FLOR     Standing up from a chair using your arms (e.g., wheelchair, bedside chair)?  3  -RH  3  -FLOR     Climbing 3-5 steps with a railing?  4  -RH  3  -FLOR     To walk in hospital room?  4  -  3  -FLOR     AM-PAC 6 Clicks Score (PT)  20  -  18  -FLOR        Functional Assessment    Outcome Measure Options  --  AM-PAC 6 Clicks Basic Mobility (PT)  -FLOR       User Key  (r) = Recorded By, (t) = Taken By, (c) = Cosigned By    Initials Name Provider Type    RH Mino Dove, BON Physical Therapy Assistant    Binu Feliz, PT  Physical Therapist           Time Calculation:   PT Charges     Row Name 06/19/21 1654             Time Calculation    PT Received On  06/19/21  -RH         Timed Charges    10858 - Gait Training Minutes   7  -RH      50846 - PT Therapeutic Activity Minutes  5  -RH         Total Minutes    Timed Charges Total Minutes  12  -RH       Total Minutes  12  -RH        User Key  (r) = Recorded By, (t) = Taken By, (c) = Cosigned By    Initials Name Provider Type     Mino Dove PTA Physical Therapy Assistant        Therapy Charges for Today     Code Description Service Date Service Provider Modifiers Qty    56878746859 HC GAIT TRAINING EA 15 MIN 6/19/2021 Mino Dove PTA GP 1          PT G-Codes  Outcome Measure Options: AM-PAC 6 Clicks Basic Mobility (PT)  AM-PAC 6 Clicks Score (PT): 20    Mino Dove PTA  6/19/2021

## 2021-06-19 NOTE — PROGRESS NOTES
[facility]   Hospitalist Progress Note  Date: 2021  Patient Name: Sabi Haynes  : 1940  MRN: [id number]  Date of admission: 2021      Subjective   Subjective     Chief Complaint:   Right shoulder pain     Summary:   Sabi Haynes is an 80 y.o. female with past medical history significant for diabetes, hypothyroidism.  She has been dealing with right shoulder pain for 3 years now, has been receiving injections with Dr. Quintero for some time.  However as these injections were no longer providing optimal relief, decision was made for patient to have right shoulder arthroplasty.  Patient tolerated the procedure well with no intra nor postoperative complications.  Patient has history of home self catheterizations 2/2 ureteral stenosis.  Patient states she self caths herself 4-5 times per day, following her procedure this would not be possible at home alone.  Patient states she is looking for SNF placement as she recovers.    Interval Followup:   Patient Norco has been increased.  Continues to complain of pain.  Patient still requesting Horvath catheter at this time, again reassured her that we will continue straight caths.  As patient is seeking placement in a SNF while she heals, she will have to be straight cath.    Review of Systems   All systems were reviewed and negative except for: Right shoulder pain    Objective   Objective     Vitals:   Temp:  [97.5 °F (36.4 °C)-98.2 °F (36.8 °C)] 98.2 °F (36.8 °C)  Heart Rate:  [68-90] 90  Resp:  [18] 18  BP: (138-180)/(61-80) 138/61  Flow (L/min):  [2] 2  Physical Exam               Constitutional: Awake, alert, no acute distress              Eyes: Pupils equal, sclerae anicteric, no conjunctival injection              HENT: NCAT, mucous membranes dry              Respiratory: Clear to auscultation bilaterally, nonlabored respirations               Cardiovascular: RRR, no murmurs, rubs, or gallops, palpable pedal pulses bilaterally               Gastrointestinal: Positive bowel sounds, soft, nontender, nondistended              Musculoskeletal: No bilateral ankle edema, patient's right upper extremity in surgical brace, moving hand with no numbness or tingling in hand.  States she is having pain in right shoulder              Psychiatric: Appropriate affect, cooperative              Neurologic: Oriented x 3, strength symmetric in all extremities, Cranial Nerves grossly intact to confrontation, speech clear    Result Review    Result Review:  I have personally reviewed the results from the time of this admission to 6/19/2021 11:06 EDT and agree with these findings:  [x]  Laboratory  []  Microbiology  [x]  Radiology  []  EKG/Telemetry   []  Cardiology/Vascular   []  Pathology  [x]  Old records  []  Other:    Assessment/Plan   Assessment / Plan     Assessment/Plan:  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  Diabetes on insulin  Chronic urinary retention, urethral stenosis requires self cath at home  Hypothyroidism     Plan:  Patient has been admitted to the hospital for further care and management  Orthopedic surgery consulted, appreciate assistance  Patient's home medications resumed as indicated  Sliding scale insulin with hypoglycemia protocol  Increasing Levemir to 25 given hyperglycemia  Order for straight cath and bladder scan has been ordered, no Horvath to be placed  PT OT has been consulted  Discussed with social work, as patient is unable to care for herself at home as she can no longer self cath, she is looking for placement at SNF, I was informed her case will be presented to [facility] and we will reassess following this    Discussed plan with RN,     DVT prophylaxis:  Mechanical DVT prophylaxis orders are present.    CODE STATUS:   Code Status: CPR  Medical Interventions (Level of Support Prior to Arrest): Full        Electronically signed by Madhu Lilly MD, 06/18/21, 2:17 PM EDT.

## 2021-06-19 NOTE — SIGNIFICANT NOTE
06/19/21 0756   Plan   Plan Comments Rehab referral has been made. Patient is pending bed offers.

## 2021-06-20 LAB
ANION GAP SERPL CALCULATED.3IONS-SCNC: 8 MMOL/L (ref 5–15)
BUN SERPL-MCNC: 16 MG/DL (ref 8–23)
BUN/CREAT SERPL: 17.8 (ref 7–25)
CALCIUM SPEC-SCNC: 9.1 MG/DL (ref 8.6–10.5)
CHLORIDE SERPL-SCNC: 99 MMOL/L (ref 98–107)
CO2 SERPL-SCNC: 27 MMOL/L (ref 22–29)
CREAT SERPL-MCNC: 0.9 MG/DL (ref 0.57–1)
GFR SERPL CREATININE-BSD FRML MDRD: 60 ML/MIN/1.73
GLUCOSE BLDC GLUCOMTR-MCNC: 214 MG/DL (ref 70–130)
GLUCOSE BLDC GLUCOMTR-MCNC: 232 MG/DL (ref 70–130)
GLUCOSE BLDC GLUCOMTR-MCNC: 274 MG/DL (ref 70–130)
GLUCOSE BLDC GLUCOMTR-MCNC: 277 MG/DL (ref 70–130)
GLUCOSE SERPL-MCNC: 271 MG/DL (ref 65–99)
POTASSIUM SERPL-SCNC: 3.8 MMOL/L (ref 3.5–5.2)
SODIUM SERPL-SCNC: 134 MMOL/L (ref 136–145)

## 2021-06-20 PROCEDURE — 63710000001 INSULIN LISPRO (HUMAN) PER 5 UNITS: Performed by: INTERNAL MEDICINE

## 2021-06-20 PROCEDURE — 63710000001 INSULIN DETEMIR PER 5 UNITS: Performed by: INTERNAL MEDICINE

## 2021-06-20 PROCEDURE — 99232 SBSQ HOSP IP/OBS MODERATE 35: CPT | Performed by: INTERNAL MEDICINE

## 2021-06-20 PROCEDURE — 82962 GLUCOSE BLOOD TEST: CPT

## 2021-06-20 PROCEDURE — 97116 GAIT TRAINING THERAPY: CPT

## 2021-06-20 PROCEDURE — 80048 BASIC METABOLIC PNL TOTAL CA: CPT | Performed by: INTERNAL MEDICINE

## 2021-06-20 RX ADMIN — INSULIN LISPRO 6 UNITS: 100 INJECTION, SOLUTION INTRAVENOUS; SUBCUTANEOUS at 12:10

## 2021-06-20 RX ADMIN — HYDROCODONE BITARTRATE AND ACETAMINOPHEN 1 TABLET: 7.5; 325 TABLET ORAL at 17:23

## 2021-06-20 RX ADMIN — LEVOTHYROXINE SODIUM 112 MCG: 112 TABLET ORAL at 06:30

## 2021-06-20 RX ADMIN — HYDROCODONE BITARTRATE AND ACETAMINOPHEN 1 TABLET: 7.5; 325 TABLET ORAL at 23:27

## 2021-06-20 RX ADMIN — INSULIN LISPRO 4 UNITS: 100 INJECTION, SOLUTION INTRAVENOUS; SUBCUTANEOUS at 08:17

## 2021-06-20 RX ADMIN — INSULIN DETEMIR 30 UNITS: 100 INJECTION, SOLUTION SUBCUTANEOUS at 08:16

## 2021-06-20 RX ADMIN — INSULIN LISPRO 4 UNITS: 100 INJECTION, SOLUTION INTRAVENOUS; SUBCUTANEOUS at 17:23

## 2021-06-20 RX ADMIN — INSULIN DETEMIR 30 UNITS: 100 INJECTION, SOLUTION SUBCUTANEOUS at 20:31

## 2021-06-20 NOTE — PROGRESS NOTES
[facility]   Hospitalist Progress Note  Date: 2021  Patient Name: Sabi Haynes  : 1940  MRN: [id number]  Date of admission: 2021      Subjective   Subjective     Chief Complaint:   Right shoulder pain     Summary:   Sabi Haynes is an 80 y.o. female with past medical history significant for diabetes, hypothyroidism.  She has been dealing with right shoulder pain for 3 years now, has been receiving injections with Dr. Quintero for some time.  However as these injections were no longer providing optimal relief, decision was made for patient to have right shoulder arthroplasty.  Patient tolerated the procedure well with no intra nor postoperative complications.  Patient has history of home self catheterizations 2/2 ureteral stenosis.  Patient states she self caths herself 4-5 times per day, following her procedure this would not be possible at home alone.  Patient states she is looking for SNF placement as she recovers.    Interval Followup:   Patient is pain better controlled today.  Continues work with PT and OT.  Anticipate hearing back from SNFs tomorrow    Review of Systems   All systems were reviewed and negative except for: Right shoulder pain    Objective   Objective     Vitals:   Temp:  [98 °F (36.7 °C)-98.6 °F (37 °C)] 98.6 °F (37 °C)  Heart Rate:  [] 89  Resp:  [14-18] 14  BP: (134-157)/(63-83) 152/77  Physical Exam               Constitutional: Awake, alert, no acute distress              Eyes: Pupils equal, sclerae anicteric, no conjunctival injection              HENT: NCAT, mucous membranes dry              Respiratory: Clear to auscultation bilaterally, nonlabored respirations               Cardiovascular: RRR, no murmurs, rubs, or gallops, palpable pedal pulses bilaterally              Gastrointestinal: Positive bowel sounds, soft, nontender, nondistended              Musculoskeletal: No bilateral ankle edema, patient's right upper extremity in surgical brace, moving hand  with no numbness or tingling in hand.                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  Diabetes on insulin  Chronic urinary retention, urethral stenosis requires self cath at home  Hypothyroidism     Plan:  Patient has been admitted to the hospital for further care and management  Orthopedic surgery consulted, appreciate assistance  Patient's home medications resumed as indicated  Sliding scale insulin with hypoglycemia protocol  Levemir up to 30 units daily  Order for straight cath and bladder scan has been ordered, no Horvath to be placed  PT OT has been consulted  Discussed with social work, as patient is unable to care for herself at home as she can no longer self cath, she is looking for placement at SNF, I was informed her case will be presented to UR and we will reassess following this    Discussed plan with RN,     DVT prophylaxis:  Mechanical DVT prophylaxis orders are present.    CODE STATUS:   Code Status: CPR  Medical Interventions (Level of Support Prior to Arrest): Full      Electronically signed by Madhu Lilly MD, 06/20/21, 12:32 PM EDT.

## 2021-06-20 NOTE — PLAN OF CARE
Goal Outcome Evaluation:           Progress: improving  Outcome Summary: pt tolerated care well, straight cath x3 [phone]

## 2021-06-20 NOTE — THERAPY TREATMENT NOTE
Acute Care - Physical Therapy Treatment Note   Garcia     Patient Name: Sabi Haynes  : 1940  MRN: [id number]  Today's Date: 2021           PT Assessment (last 12 hours)      PT Evaluation and Treatment     Row Name 21 09          Physical Therapy Time and Intention    Subjective Information  complains of;pain  -DF     Document Type  therapy note (daily note)  -DF     Mode of Treatment  individual therapy  -DF     Total Minutes, Physical Therapy  12  -DF     Patient Effort  adequate  -DF     Row Name 21          Mobility    Extremity Weight-bearing Status  right upper extremity  -DF     Right Upper Extremity (Weight-bearing Status)  other (see comments) sling s/p RTC repair  -DF     Row Name 21          Bed Mobility    Bed Mobility  supine-sit  -DF     Scooting/Bridging [place] (Bed Mobility)  contact guard  -DF     Supine-Sit [place] (Bed Mobility)  contact guard  -DF     Assistive Device (Bed Mobility)  head of bed elevated  -DF     Row Name 21          Transfers    Transfers  sit-stand transfer;stand-sit transfer  -DF     Maintains Weight-bearing Status (Transfers)  able to maintain  -DF     Sit-Stand [place] (Transfers)  contact guard  -DF     Stand-Sit [place] (Transfers)  contact guard  -DF     Row Name 21 09          Gait/Stairs (Locomotion)    Gait/Stairs Locomotion  gait/ambulation independence  -DF     [place] Level (Gait)  contact guard  -DF     Assistive Device (Gait)  -- no AD  -DF     Distance in Feet (Gait)  250  -DF     Pattern (Gait)  step-through  -DF     Row Name             Wound Right shoulder    Wound - Properties Group Side: Right  -JW Location: shoulder  -JW    Retired Wound - Properties Group Side: Right  -JW Location: shoulder  -JW    Row Name 21 09          Progress Summary (PT)    Progress Toward Functional Goals (PT)  progress toward functional goals is fair  -DF     Daily Progress Summary  (PT)  improved transfers, levt sitting in recliner  -DF       User Key  (r) = Recorded By, (t) = Taken By, (c) = Cosigned By    Initials Name Provider Type    Debbie Ye, RN Registered Nurse    Chelsea Teresa PTA Physical Therapy Assistant        Physical Therapy Education                 Title: PT OT SLP Therapies (In Progress)     Topic: Physical Therapy (Done)     Point: Mobility training (Done)     Learning Progress Summary           Patient Acceptance, E,TB, VU by  at 6/18/2021 1259                   Point: Home exercise program (Done)     Learning Progress Summary           Patient Acceptance, E,TB, VU by FLOR at 6/18/2021 1259                   Point: Body mechanics (Done)     Learning Progress Summary           Patient Acceptance, E,TB, VU by FLOR at 6/18/2021 1259                   Point: Precautions (Done)     Learning Progress Summary           Patient Acceptance, E,TB, VU by  at 6/18/2021 1259                               User Key     Initials Effective Dates Name Provider Type Discipline     06/03/21 -  Binu Granados, PT Physical Therapist PT              PT Recommendation and Plan     Progress Summary (PT)  Progress Toward Functional Goals (PT): progress toward functional goals is fair  Daily Progress Summary (PT): improved transfers, levt sitting in recliner  Outcome Measures     Row Name 06/20/21 0900 06/19/21 1600 06/18/21 1200       How much help from another person do you currently need...    Turning from your back to your side while in flat bed without using bedrails?  3  -DF  3  -RH  3  -FLOR    Moving from lying on back to sitting on the side of a flat bed without bedrails?  3  -DF  3  -RH  3  -FLOR    Moving to and from a bed to a chair (including a wheelchair)?  3  -DF  3  -RH  3  -FLOR    Standing up from a chair using your arms (e.g., wheelchair, bedside chair)?  3  -DF  3  -RH  3  -FLOR    Climbing 3-5 steps with a railing?  4  -DF  4  -RH  3  -FLOR    To walk in hospital room?  4   -DF  4  -RH  3  -FLOR    AM-PAC 6 Clicks Score (PT)  20  -DF  20  -RH  18  -FLOR       Functional Assessment    Outcome Measure Options  --  --  AM-PAC 6 Clicks Basic Mobility (PT)  -FLOR      User Key  (r) = Recorded By, (t) = Taken By, (c) = Cosigned By    Initials Name Provider Type     Mino Dove, BON Physical Therapy Assistant    DF Chelsea Miller PTA Physical Therapy Assistant    Binu Feliz, PT Physical Therapist           Time Calculation:   PT Charges     Row Name 06/20/21 0942             Time Calculation    PT Received On  06/20/21  -DF      PT Goal Re-Cert Due Date  06/28/21  -DF         Timed Charges    78326 - Gait Training Minutes   10  -DF      52173 - PT Therapeutic Activity Minutes  2  -DF         Total Minutes    Timed Charges Total Minutes  12  -DF       Total Minutes  12  -DF        User Key  (r) = Recorded By, (t) = Taken By, (c) = Cosigned By    Initials Name Provider Type     Chelsea Miller PTA Physical Therapy Assistant        Therapy Charges for Today     Code Description Service Date Service Provider Modifiers Qty    01905168105 HC GAIT TRAINING EA 15 MIN 6/20/2021 Chelsea Miller PTA GP 1          PT G-Codes  Outcome Measure Options: AM-PAC 6 Clicks Basic Mobility (PT)  AM-PAC 6 Clicks Score (PT): 20    Chelsea Miller PTA  6/20/2021

## 2021-06-20 NOTE — PLAN OF CARE
Goal Outcome Evaluation:  Plan of Care Reviewed With: patient        Progress: improving  Outcome Summary: Patient rested well. Pain managed well with PRN pain medication. Straight cath x1 this shift.

## 2021-06-20 NOTE — SIGNIFICANT NOTE
06/20/21 0754   Plan   Plan Comments Rehab referrals has been made. However, still no bed offers at this time.

## 2021-06-21 LAB
ANION GAP SERPL CALCULATED.3IONS-SCNC: 11 MMOL/L (ref 5–15)
BUN SERPL-MCNC: 20 MG/DL (ref 8–23)
BUN/CREAT SERPL: 22.5 (ref 7–25)
CALCIUM SPEC-SCNC: 8.9 MG/DL (ref 8.6–10.5)
CHLORIDE SERPL-SCNC: 101 MMOL/L (ref 98–107)
CO2 SERPL-SCNC: 22 MMOL/L (ref 22–29)
CREAT SERPL-MCNC: 0.89 MG/DL (ref 0.57–1)
GFR SERPL CREATININE-BSD FRML MDRD: 61 ML/MIN/1.73
GLUCOSE BLDC GLUCOMTR-MCNC: 219 MG/DL (ref 70–130)
GLUCOSE BLDC GLUCOMTR-MCNC: 233 MG/DL (ref 70–130)
GLUCOSE BLDC GLUCOMTR-MCNC: 251 MG/DL (ref 70–130)
GLUCOSE BLDC GLUCOMTR-MCNC: 266 MG/DL (ref 70–130)
GLUCOSE SERPL-MCNC: 235 MG/DL (ref 65–99)
POTASSIUM SERPL-SCNC: 4.2 MMOL/L (ref 3.5–5.2)
SARS-COV-2 RNA RESP QL NAA+PROBE: NOT DETECTED
SODIUM SERPL-SCNC: 134 MMOL/L (ref 136–145)

## 2021-06-21 PROCEDURE — 63710000001 INSULIN DETEMIR PER 5 UNITS: Performed by: INTERNAL MEDICINE

## 2021-06-21 PROCEDURE — 82962 GLUCOSE BLOOD TEST: CPT

## 2021-06-21 PROCEDURE — 63710000001 INSULIN LISPRO (HUMAN) PER 5 UNITS: Performed by: INTERNAL MEDICINE

## 2021-06-21 PROCEDURE — 94799 UNLISTED PULMONARY SVC/PX: CPT

## 2021-06-21 PROCEDURE — 97116 GAIT TRAINING THERAPY: CPT

## 2021-06-21 PROCEDURE — U0005 INFEC AGEN DETEC AMPLI PROBE: HCPCS | Performed by: INTERNAL MEDICINE

## 2021-06-21 PROCEDURE — 80048 BASIC METABOLIC PNL TOTAL CA: CPT | Performed by: INTERNAL MEDICINE

## 2021-06-21 PROCEDURE — U0003 INFECTIOUS AGENT DETECTION BY NUCLEIC ACID (DNA OR RNA); SEVERE ACUTE RESPIRATORY SYNDROME CORONAVIRUS 2 (SARS-COV-2) (CORONAVIRUS DISEASE [COVID-19]), AMPLIFIED PROBE TECHNIQUE, MAKING USE OF HIGH THROUGHPUT TECHNOLOGIES AS DESCRIBED BY CMS-2020-01-R: HCPCS | Performed by: INTERNAL MEDICINE

## 2021-06-21 PROCEDURE — 99232 SBSQ HOSP IP/OBS MODERATE 35: CPT | Performed by: INTERNAL MEDICINE

## 2021-06-21 RX ORDER — POLYETHYLENE GLYCOL 3350 17 G/17G
17 POWDER, FOR SOLUTION ORAL DAILY
Status: DISCONTINUED | OUTPATIENT
Start: 2021-06-21 | End: 2021-06-22 | Stop reason: HOSPADM

## 2021-06-21 RX ORDER — SENNA PLUS 8.6 MG/1
1 TABLET ORAL NIGHTLY
Status: DISCONTINUED | OUTPATIENT
Start: 2021-06-21 | End: 2021-06-22 | Stop reason: HOSPADM

## 2021-06-21 RX ORDER — BISACODYL 10 MG
10 SUPPOSITORY, RECTAL RECTAL ONCE
Status: COMPLETED | OUTPATIENT
Start: 2021-06-21 | End: 2021-06-21

## 2021-06-21 RX ADMIN — HYDROCODONE BITARTRATE AND ACETAMINOPHEN 1 TABLET: 7.5; 325 TABLET ORAL at 22:15

## 2021-06-21 RX ADMIN — HYDROCODONE BITARTRATE AND ACETAMINOPHEN 1 TABLET: 7.5; 325 TABLET ORAL at 15:09

## 2021-06-21 RX ADMIN — HYDROCODONE BITARTRATE AND ACETAMINOPHEN 1 TABLET: 7.5; 325 TABLET ORAL at 07:56

## 2021-06-21 RX ADMIN — BISACODYL 10 MG: 10 SUPPOSITORY RECTAL at 14:55

## 2021-06-21 RX ADMIN — INSULIN LISPRO 6 UNITS: 100 INJECTION, SOLUTION INTRAVENOUS; SUBCUTANEOUS at 12:45

## 2021-06-21 RX ADMIN — INSULIN LISPRO 4 UNITS: 100 INJECTION, SOLUTION INTRAVENOUS; SUBCUTANEOUS at 18:15

## 2021-06-21 RX ADMIN — INSULIN DETEMIR 35 UNITS: 100 INJECTION, SOLUTION SUBCUTANEOUS at 22:00

## 2021-06-21 RX ADMIN — LEVOTHYROXINE SODIUM 112 MCG: 112 TABLET ORAL at 06:11

## 2021-06-21 RX ADMIN — INSULIN DETEMIR 35 UNITS: 100 INJECTION, SOLUTION SUBCUTANEOUS at 09:35

## 2021-06-21 RX ADMIN — INSULIN LISPRO 4 UNITS: 100 INJECTION, SOLUTION INTRAVENOUS; SUBCUTANEOUS at 08:25

## 2021-06-21 RX ADMIN — SENNOSIDES 1 TABLET: 8.6 TABLET, FILM COATED ORAL at 22:00

## 2021-06-21 NOTE — PLAN OF CARE
Goal Outcome Evaluation:  Plan of Care Reviewed With: patient        Progress: improving  Outcome Summary: Patient rested well. Pain managed with PRN pain meds. Straight cath x1

## 2021-06-21 NOTE — PROGRESS NOTES
[facility]   Hospitalist Progress Note  Date: 2021  Patient Name: Sabi Haynes  : 1940  MRN: [id number]  Date of admission: 2021      Subjective   Subjective     Chief Complaint:   Right shoulder pain     Summary:   Sabi Haynes is an 80 y.o. female with past medical history significant for diabetes, hypothyroidism.  She has been dealing with right shoulder pain for 3 years now, has been receiving injections with Dr. Quintero for some time.  However as these injections were no longer providing optimal relief, decision was made for patient to have right shoulder arthroplasty.  Patient tolerated the procedure well with no intra nor postoperative complications.  Patient has history of home self catheterizations 2/2 ureteral stenosis.  Patient states she self caths herself 4-5 times per day, following her procedure this would not be possible at home alone.  Patient states she is looking for SNF placement as she recovers.    Interval Followup:    has approved patient's admission. Patient has bed in SNF at [facility], will DC in the morning. Patient will need to have BM prior to DC, meds have been added.     Review of Systems   All systems were reviewed and negative except for: Right shoulder pain    Objective   Objective     Vitals:   Temp:  [97.3 °F (36.3 °C)-98.6 °F (37 °C)] 98.6 °F (37 °C)  Heart Rate:  [] 88  Resp:  [15-18] 18  BP: (106-158)/(49-89) 158/80  Physical Exam               Constitutional: Awake, alert, no acute distress              Eyes: Pupils equal, sclerae anicteric, no conjunctival injection              HENT: NCAT, mucous membranes dry              Respiratory: Clear to auscultation bilaterally, nonlabored respirations               Cardiovascular: RRR, no murmurs, rubs, or gallops, palpable pedal pulses bilaterally              Gastrointestinal: Positive bowel sounds, soft, nontender, nondistended              Musculoskeletal: No bilateral ankle edema,  patient's right upper extremity in surgical brace, moving hand with no numbness or tingling in hand.                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  Diabetes on insulin  Chronic urinary retention, urethral stenosis requires self cath at home  Hypothyroidism     Plan:  Patient has been admitted to the hospital for further care and management  Orthopedic surgery consulted, appreciate assistance  Patient's home medications resumed as indicated  Sliding scale insulin with hypoglycemia protocol  Levemir increased up to 35 units daily  Order for straight cath and bladder scan has been ordered, no Horvath to be placed  PT OT has been consulted  Plan to discharge in the morning    Discussed plan with RN,     DVT prophylaxis:  Mechanical DVT prophylaxis orders are present.    CODE STATUS:   Code Status: CPR  Medical Interventions (Level of Support Prior to Arrest): Full    Electronically signed by Madhu Lilly MD, 06/21/21, 5:48 PM EDT.

## 2021-06-21 NOTE — THERAPY TREATMENT NOTE
Acute Care - Physical Therapy Treatment Note   [facility]     Patient Name: Sabi Haynes  : 1940  MRN: [id number]  Today's Date: 2021           PT Assessment (last 12 hours)      PT Evaluation and Treatment     Row Name 21 0800          Physical Therapy Time and Intention    Subjective Information  complains of;pain  -RH     Document Type  therapy note (daily note)  -RH     Mode of Treatment  physical therapy  -RH     Patient Effort  good  -RH     Row Name 21 08          Pain Scale: Word Pre/Post-Treatment    Posttreatment Pain Rating  8 - severe pain  -RH     Pain Location - Side  Right  -RH     Pain Location - Orientation  generalized  -RH     Pain Location  shoulder  -RH     Pain Intervention(s)  Emotional support;Nursing Notified  -     Row Name 21 08          Bed Mobility    Supine-Sit [place] (Bed Mobility)  contact guard;minimum assist (75% patient effort)  -     Assistive Device (Bed Mobility)  bed rails  -     Row Name 21 08          Transfers    Sit-Stand [place] (Transfers)  contact guard  -     Stand-Sit [place] (Transfers)  contact guard  -     Row Name 21 0800          Gait/Stairs (Locomotion)    Distance in Feet (Gait)  275  -RH     Row Name             Wound Right shoulder    Wound - Properties Group Side: Right  -JW Location: shoulder  -JW    Retired Wound - Properties Group Side: Right  -JW Location: shoulder  -JW    Row Name 21 0800          Progress Summary (PT)    Progress Toward Functional Goals (PT)  progress toward functional goals as expected  -       User Key  (r) = Recorded By, (t) = Taken By, (c) = Cosigned By    Initials Name Provider Type    Debbie Ye, RN Registered Nurse    Mino Thao PTA Physical Therapy Assistant        Physical Therapy Education                 Title: PT OT SLP Therapies (In Progress)     Topic: Physical Therapy (Done)     Point: Mobility training (Done)     Learning  Progress Summary           Patient Acceptance, E, VU by ETHAN at 6/21/2021 0818    Acceptance, E,TB, VU by FLOR at 6/18/2021 1259                   Point: Home exercise program (Done)     Learning Progress Summary           Patient Acceptance, E, VU by ETHAN at 6/21/2021 0818    Acceptance, E,TB, VU by FLOR at 6/18/2021 1259                   Point: Body mechanics (Done)     Learning Progress Summary           Patient Acceptance, E, VU by ETHAN at 6/21/2021 0818    Acceptance, E,TB, VU by FLOR at 6/18/2021 1259                   Point: Precautions (Done)     Learning Progress Summary           Patient Acceptance, E, VU by ETHAN at 6/21/2021 0818    Acceptance, E,TB, VU by FLOR at 6/18/2021 1259                               User Key     Initials Effective Dates Name Provider Type Discipline     06/16/21 -  Lorrie Zhou, RN Registered Nurse Nurse    FLOR 06/03/21 -  Binu Granados, PT Physical Therapist PT              PT Recommendation and Plan     Progress Summary (PT)  Progress Toward Functional Goals (PT): progress toward functional goals as expected  Outcome Measures     Row Name 06/21/21 0841 06/20/21 0900 06/19/21 1600       How much help from another person do you currently need...    Turning from your back to your side while in flat bed without using bedrails?  4  -RH  3  -DF  3  -RH    Moving from lying on back to sitting on the side of a flat bed without bedrails?  3  -RH  3  -DF  3  -RH    Moving to and from a bed to a chair (including a wheelchair)?  3  -RH  3  -DF  3  -RH    Standing up from a chair using your arms (e.g., wheelchair, bedside chair)?  3  -RH  3  -DF  3  -RH    Climbing 3-5 steps with a railing?  3  -RH  4  -DF  4  -RH    To walk in hospital room?  4  -RH  4  -DF  4  -RH    AM-PAC 6 Clicks Score (PT)  20  -RH  20  -DF  20  -RH    Row Name 06/18/21 1200             How much help from another person do you currently need...    Turning from your back to your side while in flat bed without using bedrails?  3   -FLOR      Moving from lying on back to sitting on the side of a flat bed without bedrails?  3  -FLOR      Moving to and from a bed to a chair (including a wheelchair)?  3  -FLOR      Standing up from a chair using your arms (e.g., wheelchair, bedside chair)?  3  -FLOR      Climbing 3-5 steps with a railing?  3  -FLOR      To walk in hospital room?  3  -FLOR      AM-PAC 6 Clicks Score (PT)  18  -FLOR         Functional Assessment    Outcome Measure Options  AM-PAC 6 Clicks Basic Mobility (PT)  -FLOR        User Key  (r) = Recorded By, (t) = Taken By, (c) = Cosigned By    Initials Name Provider Type    Mino Thao PTA Physical Therapy Assistant    Chelsea Teresa PTA Physical Therapy Assistant    Binu Feliz, PT Physical Therapist           Time Calculation:   PT Charges     Row Name 06/21/21 0835             Time Calculation    PT Received On  06/21/21  -RH         Timed Charges    21081 - Gait Training Minutes   6  -RH      57873 - PT Therapeutic Activity Minutes  4  -RH         Total Minutes    Timed Charges Total Minutes  10  -RH       Total Minutes  10  -RH        User Key  (r) = Recorded By, (t) = Taken By, (c) = Cosigned By    Initials Name Provider Type     Mino Dove PTA Physical Therapy Assistant        Therapy Charges for Today     Code Description Service Date Service Provider Modifiers Qty    [id number] HC GAIT TRAINING EA 15 MIN 6/21/2021 Mino Dove PTA GP 1          PT G-Codes  Outcome Measure Options: AM-PAC 6 Clicks Basic Mobility (PT)  AM-PAC 6 Clicks Score (PT): 20    Mino Dove PTA  6/21/2021

## 2021-06-21 NOTE — PLAN OF CARE
Goal Outcome Evaluation:           Pt tolerating well. Continue straight cathingas needed. Pain controlled. Awaiting rehab placement tomorrow.

## 2021-06-22 ENCOUNTER — HOSPITAL ENCOUNTER (INPATIENT)
Facility: HOSPITAL | Age: 81
LOS: 6 days | Discharge: HOME | End: 2021-06-28
Attending: INTERNAL MEDICINE | Admitting: INTERNAL MEDICINE

## 2021-06-22 VITALS
HEART RATE: 82 BPM | TEMPERATURE: 98 F | OXYGEN SATURATION: 94 % | WEIGHT: 179.68 LBS | RESPIRATION RATE: 12 BRPM | HEIGHT: 66 IN | SYSTOLIC BLOOD PRESSURE: 124 MMHG | BODY MASS INDEX: 28.88 KG/M2 | DIASTOLIC BLOOD PRESSURE: 63 MMHG

## 2021-06-22 DIAGNOSIS — R26.2 DIFFICULTY WALKING: Primary | ICD-10-CM

## 2021-06-22 DIAGNOSIS — Z78.9 DECREASED ACTIVITIES OF DAILY LIVING (ADL): ICD-10-CM

## 2021-06-22 PROBLEM — E11.9 DIABETES MELLITUS (HCC): Status: ACTIVE | Noted: 2021-06-22

## 2021-06-22 PROBLEM — N32.9 BLADDER DISORDER: Status: ACTIVE | Noted: 2021-06-22

## 2021-06-22 PROBLEM — I10 HYPERTENSION: Status: ACTIVE | Noted: 2021-06-22

## 2021-06-22 PROBLEM — E78.5 HYPERLIPEMIA: Status: ACTIVE | Noted: 2021-06-22

## 2021-06-22 LAB
ANION GAP SERPL CALCULATED.3IONS-SCNC: 15.6 MMOL/L (ref 5–15)
BUN SERPL-MCNC: 28 MG/DL (ref 8–23)
BUN/CREAT SERPL: 25.5 (ref 7–25)
CALCIUM SPEC-SCNC: 9.1 MG/DL (ref 8.6–10.5)
CHLORIDE SERPL-SCNC: 98 MMOL/L (ref 98–107)
CO2 SERPL-SCNC: 14.4 MMOL/L (ref 22–29)
CREAT SERPL-MCNC: 1.1 MG/DL (ref 0.57–1)
GFR SERPL CREATININE-BSD FRML MDRD: 48 ML/MIN/1.73
GLUCOSE BLDC GLUCOMTR-MCNC: 237 MG/DL (ref 70–130)
GLUCOSE BLDC GLUCOMTR-MCNC: 240 MG/DL (ref 70–130)
GLUCOSE BLDC GLUCOMTR-MCNC: 259 MG/DL (ref 70–130)
GLUCOSE BLDC GLUCOMTR-MCNC: 269 MG/DL (ref 70–130)
GLUCOSE SERPL-MCNC: 310 MG/DL (ref 65–99)
POTASSIUM SERPL-SCNC: 4.8 MMOL/L (ref 3.5–5.2)
SODIUM SERPL-SCNC: 128 MMOL/L (ref 136–145)

## 2021-06-22 PROCEDURE — 94799 UNLISTED PULMONARY SVC/PX: CPT

## 2021-06-22 PROCEDURE — 63710000001 DIPHENHYDRAMINE PER 50 MG: Performed by: PHYSICIAN ASSISTANT

## 2021-06-22 PROCEDURE — 63710000001 INSULIN DETEMIR PER 5 UNITS: Performed by: INTERNAL MEDICINE

## 2021-06-22 PROCEDURE — 82962 GLUCOSE BLOOD TEST: CPT

## 2021-06-22 PROCEDURE — 63710000001 INSULIN LISPRO (HUMAN) PER 5 UNITS: Performed by: INTERNAL MEDICINE

## 2021-06-22 PROCEDURE — 99239 HOSP IP/OBS DSCHRG MGMT >30: CPT | Performed by: INTERNAL MEDICINE

## 2021-06-22 PROCEDURE — 80048 BASIC METABOLIC PNL TOTAL CA: CPT | Performed by: INTERNAL MEDICINE

## 2021-06-22 RX ORDER — POLYETHYLENE GLYCOL 3350 17 G/17G
17 POWDER, FOR SOLUTION ORAL DAILY
Status: CANCELLED | OUTPATIENT
Start: 2021-06-23

## 2021-06-22 RX ORDER — HYDROCODONE BITARTRATE AND ACETAMINOPHEN 7.5; 325 MG/1; MG/1
1 TABLET ORAL EVERY 8 HOURS PRN
Status: DISCONTINUED | OUTPATIENT
Start: 2021-06-22 | End: 2021-06-23

## 2021-06-22 RX ORDER — LEVOTHYROXINE SODIUM 112 UG/1
112 TABLET ORAL EVERY MORNING
Status: DISCONTINUED | OUTPATIENT
Start: 2021-06-23 | End: 2021-06-28 | Stop reason: HOSPADM

## 2021-06-22 RX ORDER — LEVOTHYROXINE SODIUM 112 UG/1
112 TABLET ORAL DAILY
Status: CANCELLED | OUTPATIENT
Start: 2021-06-22

## 2021-06-22 RX ORDER — CHOLECALCIFEROL (VITAMIN D3) 125 MCG
500 CAPSULE ORAL DAILY
Status: CANCELLED | OUTPATIENT
Start: 2021-06-22

## 2021-06-22 RX ORDER — DIPHENHYDRAMINE HCL 25 MG
25 CAPSULE ORAL ONCE
Status: COMPLETED | OUTPATIENT
Start: 2021-06-22 | End: 2021-06-22

## 2021-06-22 RX ORDER — HYDROCODONE BITARTRATE AND ACETAMINOPHEN 7.5; 325 MG/1; MG/1
1 TABLET ORAL EVERY 8 HOURS PRN
Status: CANCELLED | OUTPATIENT
Start: 2021-06-22

## 2021-06-22 RX ORDER — CHOLECALCIFEROL (VITAMIN D3) 125 MCG
500 CAPSULE ORAL DAILY
Status: DISCONTINUED | OUTPATIENT
Start: 2021-06-22 | End: 2021-06-28 | Stop reason: HOSPADM

## 2021-06-22 RX ORDER — POLYETHYLENE GLYCOL 3350 17 G/17G
17 POWDER, FOR SOLUTION ORAL DAILY
Status: DISCONTINUED | OUTPATIENT
Start: 2021-06-23 | End: 2021-06-25

## 2021-06-22 RX ADMIN — TUBERCULIN PURIFIED PROTEIN DERIVATIVE 5 UNITS: 5 INJECTION, SOLUTION INTRADERMAL at 20:21

## 2021-06-22 RX ADMIN — POLYETHYLENE GLYCOL 3350 17 G: 17 POWDER, FOR SOLUTION ORAL at 08:56

## 2021-06-22 RX ADMIN — INSULIN DETEMIR 35 UNITS: 100 INJECTION, SOLUTION SUBCUTANEOUS at 08:55

## 2021-06-22 RX ADMIN — INSULIN LISPRO 4 UNITS: 100 INJECTION, SOLUTION INTRAVENOUS; SUBCUTANEOUS at 17:49

## 2021-06-22 RX ADMIN — LEVOTHYROXINE SODIUM 112 MCG: 112 TABLET ORAL at 05:13

## 2021-06-22 RX ADMIN — INSULIN LISPRO 6 UNITS: 100 INJECTION, SOLUTION INTRAVENOUS; SUBCUTANEOUS at 11:42

## 2021-06-22 RX ADMIN — CYANOCOBALAMIN TAB 500 MCG 500 MCG: 500 TAB at 17:49

## 2021-06-22 RX ADMIN — HYDROCODONE BITARTRATE AND ACETAMINOPHEN 2 TABLET: 7.5; 325 TABLET ORAL at 11:42

## 2021-06-22 RX ADMIN — DIPHENHYDRAMINE HYDROCHLORIDE 25 MG: 25 CAPSULE ORAL at 05:13

## 2021-06-22 RX ADMIN — HYDROCODONE BITARTRATE AND ACETAMINOPHEN 1 TABLET: 7.5; 325 TABLET ORAL at 22:10

## 2021-06-22 RX ADMIN — INSULIN DETEMIR 35 UNITS: 100 INJECTION, SOLUTION SUBCUTANEOUS at 20:19

## 2021-06-22 RX ADMIN — HYDROCODONE BITARTRATE AND ACETAMINOPHEN 1 TABLET: 7.5; 325 TABLET ORAL at 05:13

## 2021-06-22 RX ADMIN — INSULIN LISPRO 4 UNITS: 100 INJECTION, SOLUTION INTRAVENOUS; SUBCUTANEOUS at 08:54

## 2021-06-22 RX ADMIN — Medication 5000 UNITS: at 18:18

## 2021-06-22 NOTE — DISCHARGE SUMMARY
[facility]         HOSPITALIST  DISCHARGE SUMMARY    Patient Name: Sabi Haynes  : 1940  MRN: [id number]    Date of Admission: 2021  Date of Discharge:  2021  Primary Care Physician: Drea Villalpando MD    Consultants: Dr. Quintero    Active and Resolved Hospital Problems:  Active Hospital Problems    Diagnosis POA   • Rotator cuff tear [M75.100] Yes   • S/P right rotator cuff repair [Z98.890] Not Applicable      Resolved Hospital Problems   No resolved problems to display.       Hospital Course     Hospital Course:  Sabi Haynes is a 80 y.o. female with diabetes, hypothyroidism presents due to right shoulder pain and underwent right total shoulder arthroplasty and tolerated the procedure well.  Patient performs home self catheterizations due to ureteral stenosis 4-5 times per day and is unable to perform this due to her shoulder repair.  She is being transferred to skilled nursing facility today for further rehab in stable condition.      DISCHARGE Follow Up Recommendations for labs and diagnostics:   Follow-up with orthopedic surgery  Follow-up with PCP  Transferring to skilled nursing facility today      Day of Discharge     Vital Signs:  Temp:  [98 °F (36.7 °C)-98.6 °F (37 °C)] 98 °F (36.7 °C)  Heart Rate:  [78-95] 82  Resp:  [12-20] 12  BP: (111-158)/(57-80) 124/63  Physical Exam:   Gen: NAD, WDWN  Resp: no dyspnea  CV: no LE edema  GI: Abdomen soft +bs  Psych: AOx3, normal mood and affect  MSK: Right shoulder in immobilizer    Discharge Details        Discharge Medications      ASK your doctor about these medications      Instructions Start Date   HYDROcodone-acetaminophen 7.5-325 MG per tablet  Commonly known as: NORCO   1 tablet, Oral, Every 8 Hours PRN      Lantus SoloStar 100 UNIT/ML injection pen  Generic drug: Insulin Glargine   46 Units, Subcutaneous, Every Evening, TAKES AROUND 1430 EVERY DAY. INSTRUCTED PER ANESTHESIA PROTOCOL      levothyroxine 112 MCG  tablet  Commonly known as: SYNTHROID, LEVOTHROID   112 mcg, Oral, Daily      vitamin B-12 500 MCG tablet  Commonly known as: CYANOCOBALAMIN   500 mcg, Oral, Daily      vitamin D3 125 MCG (5000 UT) capsule capsule   5,000 Units, Oral, Daily             Discharge Disposition:  Skilled Nursing Facility ([facility])    Diet:  Hospital:  Diet Order   Procedures   • Diet Regular; Consistent Carbohydrate       Discharge Activity: Per PT OT      CODE STATUS:  Code Status and Medical Interventions:   Ordered at: 06/17/21 1739     Code Status:    CPR     Medical Interventions (Level of Support Prior to Arrest):    Full       Future Appointments   Date Time Provider Department Center   6/25/2021 12:45 PM C19 VAC [facility] OUT [facility]  [facility] C19[facility]   6/30/2021  1:45 PM Lilibeth Vallejo PA [facility] ORS [facility]           Pertinent  and/or Most Recent Results     PROCEDURES:   Right shoulder arthroscopy, subacromial decompression, distal claviculectomy, rotator cuff repair    LAB RESULTS:      Lab 06/19/21  0450 06/18/21  0659   WBC 9.37 9.94   HEMOGLOBIN 13.4 12.5   HEMATOCRIT 41.3 39.1   PLATELETS 202 198   MCV 90.6 91.4         Lab 06/22/21  0421 06/21/21  0411 06/20/21  0413 06/19/21  0450 06/18/21  0659   SODIUM 128* 134* 134* 138 133*   POTASSIUM 4.8 4.2 3.8 3.8 4.3   CHLORIDE 98 101 99 104 106   CO2 14.4* 22.0 27.0 26.7 21.4*   ANION GAP 15.6* 11.0 8.0 7.3 5.6   BUN 28* 20 16 18 19   CREATININE 1.10* 0.89 0.90 0.91 0.98   GLUCOSE 310* 235* 271* 225* 272*   CALCIUM 9.1 8.9 9.1 9.0 8.5*   MAGNESIUM  --   --   --   --  1.8                         Brief Urine Lab Results  (Last result in the past 365 days)      Color   Clarity   Blood   Leuk Est   Nitrite   Protein   CREAT   Urine HCG        04/02/21 1422 Yellow Cloudy Trace-Intact Trace Positive Negative             Microbiology Results (last 10 days)     Procedure Component Value - Date/Time    COVID PRE-OP / PRE-PROCEDURE SCREENING ORDER (NO ISOLATION) - Swab,  Nasopharynx [[id number]]  (Normal) Collected: 06/21/21 1320    Lab Status: Final result Specimen: Swab from Nasopharynx Updated: 06/21/21 1756    Narrative:      The following orders were created for panel order COVID PRE-OP / PRE-PROCEDURE SCREENING ORDER (NO ISOLATION) - Swab, Nasopharynx.  Procedure                               Abnormality         Status                     ---------                               -----------         ------                     COVID-19,CEPHEID,COR/MELINDA...[[id number]]  Normal              Final result                 Please view results for these tests on the individual orders.    COVID-19,CEPHEID,COR/MELINDA/PAD/[facility] IN-HOUSE(OR EMERGENT/ADD-ON),NP SWAB IN TRANSPORT MEDIA 3-4 HR TAT, RT-PCR - Swab, Nasopharynx [[id number]]  (Normal) Collected: 06/21/21 1320    Lab Status: Final result Specimen: Swab from Nasopharynx Updated: 06/21/21 1756     COVID19 Not Detected    Narrative:      Fact sheet for providers: https://www.fda.gov/media/192804/download     Fact sheet for patients: https://www.fda.gov/media/409272/download  Fact sheet for providers: https://www.fda.gov/media/470186/download     Fact sheet for patients: https://www.fda.gov/media/590534/download                     Labs Pending at Discharge: None      Time spent on Discharge including face to face service: Greater than 35 minutes    Electronically signed by Silvia Doherty MD, 06/22/21, 12:36 PM EDT.

## 2021-06-22 NOTE — SIGNIFICANT NOTE
06/22/21 0804   Plan   Final Discharge Disposition Code 03 - skilled nursing facility (SNF)   Final Note Patient can discharge to [facility] today with a negative COVID.

## 2021-06-22 NOTE — PLAN OF CARE
Goal Outcome Evaluation:              Outcome Summary: pt slept well throughout the night, straight cathed x2 per patient request, rash developed on coccyx area (one time benadryl ordered from night MD)

## 2021-06-22 NOTE — PLAN OF CARE
Goal Outcome Evaluation:  Plan of Care Reviewed With: patient        Progress: improving  Outcome Summary: patient transfreing to skilled nursing facility.

## 2021-06-23 LAB
GLUCOSE BLDC GLUCOMTR-MCNC: 209 MG/DL (ref 70–130)
GLUCOSE BLDC GLUCOMTR-MCNC: 226 MG/DL (ref 70–130)
GLUCOSE BLDC GLUCOMTR-MCNC: 265 MG/DL (ref 70–130)
GLUCOSE BLDC GLUCOMTR-MCNC: 307 MG/DL (ref 70–130)

## 2021-06-23 PROCEDURE — 82962 GLUCOSE BLOOD TEST: CPT

## 2021-06-23 PROCEDURE — 63710000001 INSULIN DETEMIR PER 5 UNITS: Performed by: INTERNAL MEDICINE

## 2021-06-23 PROCEDURE — 94799 UNLISTED PULMONARY SVC/PX: CPT

## 2021-06-23 PROCEDURE — 97161 PT EVAL LOW COMPLEX 20 MIN: CPT

## 2021-06-23 PROCEDURE — 63710000001 INSULIN LISPRO (HUMAN) PER 5 UNITS: Performed by: INTERNAL MEDICINE

## 2021-06-23 PROCEDURE — 99306 1ST NF CARE HIGH MDM 50: CPT | Performed by: PHYSICIAN ASSISTANT

## 2021-06-23 RX ORDER — HYDROCODONE BITARTRATE AND ACETAMINOPHEN 7.5; 325 MG/1; MG/1
1 TABLET ORAL EVERY 6 HOURS PRN
Status: DISCONTINUED | OUTPATIENT
Start: 2021-06-23 | End: 2021-06-28 | Stop reason: HOSPADM

## 2021-06-23 RX ORDER — MAG HYDROX/ALUMINUM HYD/SIMETH 400-400-40
1 SUSPENSION, ORAL (FINAL DOSE FORM) ORAL DAILY PRN
Status: DISCONTINUED | OUTPATIENT
Start: 2021-06-23 | End: 2021-06-28 | Stop reason: HOSPADM

## 2021-06-23 RX ADMIN — Medication 5000 UNITS: at 08:40

## 2021-06-23 RX ADMIN — INSULIN LISPRO 4 UNITS: 100 INJECTION, SOLUTION INTRAVENOUS; SUBCUTANEOUS at 08:39

## 2021-06-23 RX ADMIN — HYDROCODONE BITARTRATE AND ACETAMINOPHEN 1 TABLET: 7.5; 325 TABLET ORAL at 14:00

## 2021-06-23 RX ADMIN — CYANOCOBALAMIN TAB 500 MCG 500 MCG: 500 TAB at 08:40

## 2021-06-23 RX ADMIN — GLYCERIN 1 SUPPOSITORY: 2 SUPPOSITORY RECTAL at 14:38

## 2021-06-23 RX ADMIN — INSULIN DETEMIR 35 UNITS: 100 INJECTION, SOLUTION SUBCUTANEOUS at 20:05

## 2021-06-23 RX ADMIN — LEVOTHYROXINE SODIUM 112 MCG: 0.11 TABLET ORAL at 06:22

## 2021-06-23 RX ADMIN — POLYETHYLENE GLYCOL 3350 17 G: 17 POWDER, FOR SOLUTION ORAL at 08:39

## 2021-06-23 RX ADMIN — HYDROCODONE BITARTRATE AND ACETAMINOPHEN 1 TABLET: 7.5; 325 TABLET ORAL at 06:26

## 2021-06-23 RX ADMIN — INSULIN LISPRO 4 UNITS: 100 INJECTION, SOLUTION INTRAVENOUS; SUBCUTANEOUS at 12:18

## 2021-06-23 RX ADMIN — HYDROCODONE BITARTRATE AND ACETAMINOPHEN 1 TABLET: 7.5; 325 TABLET ORAL at 20:07

## 2021-06-23 RX ADMIN — INSULIN LISPRO 6 UNITS: 100 INJECTION, SOLUTION INTRAVENOUS; SUBCUTANEOUS at 17:41

## 2021-06-24 LAB
GLUCOSE BLDC GLUCOMTR-MCNC: 247 MG/DL (ref 70–130)
GLUCOSE BLDC GLUCOMTR-MCNC: 249 MG/DL (ref 70–130)
GLUCOSE BLDC GLUCOMTR-MCNC: 265 MG/DL (ref 70–130)
GLUCOSE BLDC GLUCOMTR-MCNC: 282 MG/DL (ref 70–130)
INDURATION: 0 MM (ref 0–10)
Lab: NORMAL
TB SKIN TEST: NEGATIVE

## 2021-06-24 PROCEDURE — 63710000001 INSULIN LISPRO (HUMAN) PER 5 UNITS: Performed by: PHYSICIAN ASSISTANT

## 2021-06-24 PROCEDURE — 99309 SBSQ NF CARE MODERATE MDM 30: CPT | Performed by: PHYSICIAN ASSISTANT

## 2021-06-24 PROCEDURE — 97535 SELF CARE MNGMENT TRAINING: CPT

## 2021-06-24 PROCEDURE — 97530 THERAPEUTIC ACTIVITIES: CPT

## 2021-06-24 PROCEDURE — 97110 THERAPEUTIC EXERCISES: CPT

## 2021-06-24 PROCEDURE — 63710000001 INSULIN LISPRO (HUMAN) PER 5 UNITS: Performed by: INTERNAL MEDICINE

## 2021-06-24 PROCEDURE — 82962 GLUCOSE BLOOD TEST: CPT

## 2021-06-24 PROCEDURE — 97165 OT EVAL LOW COMPLEX 30 MIN: CPT

## 2021-06-24 PROCEDURE — 94799 UNLISTED PULMONARY SVC/PX: CPT

## 2021-06-24 PROCEDURE — 63710000001 INSULIN DETEMIR PER 5 UNITS: Performed by: PHYSICIAN ASSISTANT

## 2021-06-24 RX ORDER — ONDANSETRON 4 MG/1
4 TABLET, ORALLY DISINTEGRATING ORAL EVERY 6 HOURS PRN
Status: DISCONTINUED | OUTPATIENT
Start: 2021-06-24 | End: 2021-06-28 | Stop reason: HOSPADM

## 2021-06-24 RX ORDER — DOCUSATE SODIUM 100 MG/1
100 CAPSULE, LIQUID FILLED ORAL 2 TIMES DAILY
Status: DISCONTINUED | OUTPATIENT
Start: 2021-06-24 | End: 2021-06-28 | Stop reason: HOSPADM

## 2021-06-24 RX ADMIN — Medication 5000 UNITS: at 08:17

## 2021-06-24 RX ADMIN — INSULIN LISPRO 4 UNITS: 100 INJECTION, SOLUTION INTRAVENOUS; SUBCUTANEOUS at 17:30

## 2021-06-24 RX ADMIN — LEVOTHYROXINE SODIUM 112 MCG: 0.11 TABLET ORAL at 06:23

## 2021-06-24 RX ADMIN — POLYETHYLENE GLYCOL 3350 17 G: 17 POWDER, FOR SOLUTION ORAL at 08:17

## 2021-06-24 RX ADMIN — DOCUSATE SODIUM 100 MG: 100 CAPSULE, LIQUID FILLED ORAL at 12:07

## 2021-06-24 RX ADMIN — HYDROCODONE BITARTRATE AND ACETAMINOPHEN 1 TABLET: 7.5; 325 TABLET ORAL at 09:53

## 2021-06-24 RX ADMIN — CYANOCOBALAMIN TAB 500 MCG 500 MCG: 500 TAB at 08:17

## 2021-06-24 RX ADMIN — HYDROCODONE BITARTRATE AND ACETAMINOPHEN 1 TABLET: 7.5; 325 TABLET ORAL at 16:43

## 2021-06-24 RX ADMIN — INSULIN LISPRO 6 UNITS: 100 INJECTION, SOLUTION INTRAVENOUS; SUBCUTANEOUS at 08:17

## 2021-06-24 RX ADMIN — INSULIN LISPRO 6 UNITS: 100 INJECTION, SOLUTION INTRAVENOUS; SUBCUTANEOUS at 17:31

## 2021-06-24 RX ADMIN — INSULIN LISPRO 6 UNITS: 100 INJECTION, SOLUTION INTRAVENOUS; SUBCUTANEOUS at 12:08

## 2021-06-24 RX ADMIN — INSULIN DETEMIR 40 UNITS: 100 INJECTION, SOLUTION SUBCUTANEOUS at 21:21

## 2021-06-24 RX ADMIN — DOCUSATE SODIUM 100 MG: 100 CAPSULE, LIQUID FILLED ORAL at 21:22

## 2021-06-25 ENCOUNTER — APPOINTMENT (OUTPATIENT)
Dept: GENERAL RADIOLOGY | Facility: HOSPITAL | Age: 81
End: 2021-06-25

## 2021-06-25 LAB
ALBUMIN SERPL-MCNC: 3.7 G/DL (ref 3.5–5.2)
ANION GAP SERPL CALCULATED.3IONS-SCNC: 10.8 MMOL/L (ref 5–15)
BUN SERPL-MCNC: 22 MG/DL (ref 8–23)
BUN/CREAT SERPL: 24.2 (ref 7–25)
CALCIUM SPEC-SCNC: 9.2 MG/DL (ref 8.6–10.5)
CHLORIDE SERPL-SCNC: 99 MMOL/L (ref 98–107)
CO2 SERPL-SCNC: 24.2 MMOL/L (ref 22–29)
CREAT SERPL-MCNC: 0.91 MG/DL (ref 0.57–1)
DEPRECATED RDW RBC AUTO: 44.8 FL (ref 37–54)
ERYTHROCYTE [DISTWIDTH] IN BLOOD BY AUTOMATED COUNT: 13.5 % (ref 12.3–15.4)
GFR SERPL CREATININE-BSD FRML MDRD: 59 ML/MIN/1.73
GLUCOSE BLDC GLUCOMTR-MCNC: 167 MG/DL (ref 70–130)
GLUCOSE BLDC GLUCOMTR-MCNC: 192 MG/DL (ref 70–130)
GLUCOSE BLDC GLUCOMTR-MCNC: 217 MG/DL (ref 70–130)
GLUCOSE BLDC GLUCOMTR-MCNC: 337 MG/DL (ref 70–130)
GLUCOSE SERPL-MCNC: 238 MG/DL (ref 65–99)
HCT VFR BLD AUTO: 39.4 % (ref 34–46.6)
HGB BLD-MCNC: 13.4 G/DL (ref 12–15.9)
MCH RBC QN AUTO: 30.4 PG (ref 26.6–33)
MCHC RBC AUTO-ENTMCNC: 34 G/DL (ref 31.5–35.7)
MCV RBC AUTO: 89.3 FL (ref 79–97)
PHOSPHATE SERPL-MCNC: 2.9 MG/DL (ref 2.5–4.5)
PLATELET # BLD AUTO: 261 10*3/MM3 (ref 140–450)
PMV BLD AUTO: 11.3 FL (ref 6–12)
POTASSIUM SERPL-SCNC: 4.4 MMOL/L (ref 3.5–5.2)
RBC # BLD AUTO: 4.41 10*6/MM3 (ref 3.77–5.28)
SODIUM SERPL-SCNC: 134 MMOL/L (ref 136–145)
WBC # BLD AUTO: 6.73 10*3/MM3 (ref 3.4–10.8)

## 2021-06-25 PROCEDURE — 63710000001 INSULIN LISPRO (HUMAN) PER 5 UNITS: Performed by: INTERNAL MEDICINE

## 2021-06-25 PROCEDURE — 97110 THERAPEUTIC EXERCISES: CPT

## 2021-06-25 PROCEDURE — 85027 COMPLETE CBC AUTOMATED: CPT | Performed by: PHYSICIAN ASSISTANT

## 2021-06-25 PROCEDURE — 97116 GAIT TRAINING THERAPY: CPT

## 2021-06-25 PROCEDURE — 63710000001 INSULIN LISPRO (HUMAN) PER 5 UNITS: Performed by: PHYSICIAN ASSISTANT

## 2021-06-25 PROCEDURE — 82962 GLUCOSE BLOOD TEST: CPT

## 2021-06-25 PROCEDURE — 80069 RENAL FUNCTION PANEL: CPT | Performed by: PHYSICIAN ASSISTANT

## 2021-06-25 PROCEDURE — 63710000001 INSULIN DETEMIR PER 5 UNITS: Performed by: PHYSICIAN ASSISTANT

## 2021-06-25 PROCEDURE — 94799 UNLISTED PULMONARY SVC/PX: CPT

## 2021-06-25 PROCEDURE — 74019 RADEX ABDOMEN 2 VIEWS: CPT

## 2021-06-25 PROCEDURE — 97530 THERAPEUTIC ACTIVITIES: CPT

## 2021-06-25 RX ORDER — POLYETHYLENE GLYCOL 3350 17 G/17G
17 POWDER, FOR SOLUTION ORAL 2 TIMES DAILY
Status: DISCONTINUED | OUTPATIENT
Start: 2021-06-25 | End: 2021-06-28 | Stop reason: HOSPADM

## 2021-06-25 RX ORDER — SODIUM PHOSPHATE,MONO-DIBASIC 19G-7G/118
1 ENEMA (ML) RECTAL ONCE
Status: COMPLETED | OUTPATIENT
Start: 2021-06-25 | End: 2021-06-25

## 2021-06-25 RX ADMIN — INSULIN LISPRO 4 UNITS: 100 INJECTION, SOLUTION INTRAVENOUS; SUBCUTANEOUS at 08:05

## 2021-06-25 RX ADMIN — INSULIN DETEMIR 40 UNITS: 100 INJECTION, SOLUTION SUBCUTANEOUS at 21:29

## 2021-06-25 RX ADMIN — DOCUSATE SODIUM 100 MG: 100 CAPSULE, LIQUID FILLED ORAL at 09:26

## 2021-06-25 RX ADMIN — INSULIN LISPRO 4 UNITS: 100 INJECTION, SOLUTION INTRAVENOUS; SUBCUTANEOUS at 12:08

## 2021-06-25 RX ADMIN — LEVOTHYROXINE SODIUM 112 MCG: 0.11 TABLET ORAL at 06:10

## 2021-06-25 RX ADMIN — INSULIN LISPRO 7 UNITS: 100 INJECTION, SOLUTION INTRAVENOUS; SUBCUTANEOUS at 12:08

## 2021-06-25 RX ADMIN — Medication 5000 UNITS: at 09:26

## 2021-06-25 RX ADMIN — GLYCERIN 1 SUPPOSITORY: 2 SUPPOSITORY RECTAL at 10:40

## 2021-06-25 RX ADMIN — CYANOCOBALAMIN TAB 500 MCG 500 MCG: 500 TAB at 09:26

## 2021-06-25 RX ADMIN — POLYETHYLENE GLYCOL 3350 17 G: 17 POWDER, FOR SOLUTION ORAL at 09:26

## 2021-06-25 RX ADMIN — SODIUM PHOSPHATE 1 APPLICATION: 7; 19 ENEMA RECTAL at 14:16

## 2021-06-25 RX ADMIN — INSULIN LISPRO 4 UNITS: 100 INJECTION, SOLUTION INTRAVENOUS; SUBCUTANEOUS at 07:58

## 2021-06-26 LAB
GLUCOSE BLDC GLUCOMTR-MCNC: 178 MG/DL (ref 70–130)
GLUCOSE BLDC GLUCOMTR-MCNC: 195 MG/DL (ref 70–130)
GLUCOSE BLDC GLUCOMTR-MCNC: 203 MG/DL (ref 70–130)
GLUCOSE BLDC GLUCOMTR-MCNC: 211 MG/DL (ref 70–130)

## 2021-06-26 PROCEDURE — 63710000001 INSULIN DETEMIR PER 5 UNITS: Performed by: PHYSICIAN ASSISTANT

## 2021-06-26 PROCEDURE — 97116 GAIT TRAINING THERAPY: CPT

## 2021-06-26 PROCEDURE — 63710000001 INSULIN LISPRO (HUMAN) PER 5 UNITS: Performed by: INTERNAL MEDICINE

## 2021-06-26 PROCEDURE — 82962 GLUCOSE BLOOD TEST: CPT

## 2021-06-26 PROCEDURE — 63710000001 INSULIN LISPRO (HUMAN) PER 5 UNITS: Performed by: PHYSICIAN ASSISTANT

## 2021-06-26 PROCEDURE — 97110 THERAPEUTIC EXERCISES: CPT

## 2021-06-26 PROCEDURE — 99309 SBSQ NF CARE MODERATE MDM 30: CPT | Performed by: PHYSICIAN ASSISTANT

## 2021-06-26 RX ADMIN — INSULIN LISPRO 6 UNITS: 100 INJECTION, SOLUTION INTRAVENOUS; SUBCUTANEOUS at 18:06

## 2021-06-26 RX ADMIN — LEVOTHYROXINE SODIUM 112 MCG: 0.11 TABLET ORAL at 06:00

## 2021-06-26 RX ADMIN — INSULIN LISPRO 6 UNITS: 100 INJECTION, SOLUTION INTRAVENOUS; SUBCUTANEOUS at 08:16

## 2021-06-26 RX ADMIN — INSULIN LISPRO 4 UNITS: 100 INJECTION, SOLUTION INTRAVENOUS; SUBCUTANEOUS at 08:15

## 2021-06-26 RX ADMIN — CYANOCOBALAMIN TAB 500 MCG 500 MCG: 500 TAB at 08:15

## 2021-06-26 RX ADMIN — DOCUSATE SODIUM 100 MG: 100 CAPSULE, LIQUID FILLED ORAL at 20:50

## 2021-06-26 RX ADMIN — Medication 5000 UNITS: at 08:15

## 2021-06-26 RX ADMIN — INSULIN LISPRO 2 UNITS: 100 INJECTION, SOLUTION INTRAVENOUS; SUBCUTANEOUS at 12:21

## 2021-06-26 RX ADMIN — INSULIN LISPRO 6 UNITS: 100 INJECTION, SOLUTION INTRAVENOUS; SUBCUTANEOUS at 12:20

## 2021-06-26 RX ADMIN — INSULIN LISPRO 2 UNITS: 100 INJECTION, SOLUTION INTRAVENOUS; SUBCUTANEOUS at 18:07

## 2021-06-26 RX ADMIN — POLYETHYLENE GLYCOL 3350 17 G: 17 POWDER, FOR SOLUTION ORAL at 08:15

## 2021-06-26 RX ADMIN — DOCUSATE SODIUM 100 MG: 100 CAPSULE, LIQUID FILLED ORAL at 08:15

## 2021-06-26 RX ADMIN — INSULIN DETEMIR 40 UNITS: 100 INJECTION, SOLUTION SUBCUTANEOUS at 20:51

## 2021-06-27 LAB
GLUCOSE BLDC GLUCOMTR-MCNC: 131 MG/DL (ref 70–130)
GLUCOSE BLDC GLUCOMTR-MCNC: 141 MG/DL (ref 70–130)
GLUCOSE BLDC GLUCOMTR-MCNC: 147 MG/DL (ref 70–130)
GLUCOSE BLDC GLUCOMTR-MCNC: 181 MG/DL (ref 70–130)
GLUCOSE BLDC GLUCOMTR-MCNC: 185 MG/DL (ref 70–130)

## 2021-06-27 PROCEDURE — 63710000001 INSULIN DETEMIR PER 5 UNITS: Performed by: PHYSICIAN ASSISTANT

## 2021-06-27 PROCEDURE — 94799 UNLISTED PULMONARY SVC/PX: CPT

## 2021-06-27 PROCEDURE — 63710000001 INSULIN LISPRO (HUMAN) PER 5 UNITS: Performed by: PHYSICIAN ASSISTANT

## 2021-06-27 PROCEDURE — 82962 GLUCOSE BLOOD TEST: CPT

## 2021-06-27 PROCEDURE — 63710000001 INSULIN LISPRO (HUMAN) PER 5 UNITS: Performed by: INTERNAL MEDICINE

## 2021-06-27 RX ADMIN — INSULIN LISPRO 6 UNITS: 100 INJECTION, SOLUTION INTRAVENOUS; SUBCUTANEOUS at 12:31

## 2021-06-27 RX ADMIN — DOCUSATE SODIUM 100 MG: 100 CAPSULE, LIQUID FILLED ORAL at 21:01

## 2021-06-27 RX ADMIN — LEVOTHYROXINE SODIUM 112 MCG: 0.11 TABLET ORAL at 06:07

## 2021-06-27 RX ADMIN — INSULIN LISPRO 2 UNITS: 100 INJECTION, SOLUTION INTRAVENOUS; SUBCUTANEOUS at 12:27

## 2021-06-27 RX ADMIN — INSULIN DETEMIR 40 UNITS: 100 INJECTION, SOLUTION SUBCUTANEOUS at 21:01

## 2021-06-27 RX ADMIN — DOCUSATE SODIUM 100 MG: 100 CAPSULE, LIQUID FILLED ORAL at 08:19

## 2021-06-27 RX ADMIN — POLYETHYLENE GLYCOL 3350 17 G: 17 POWDER, FOR SOLUTION ORAL at 08:19

## 2021-06-27 RX ADMIN — INSULIN LISPRO 6 UNITS: 100 INJECTION, SOLUTION INTRAVENOUS; SUBCUTANEOUS at 18:06

## 2021-06-27 RX ADMIN — Medication 5000 UNITS: at 08:19

## 2021-06-27 RX ADMIN — INSULIN LISPRO 6 UNITS: 100 INJECTION, SOLUTION INTRAVENOUS; SUBCUTANEOUS at 08:19

## 2021-06-27 RX ADMIN — CYANOCOBALAMIN TAB 500 MCG 500 MCG: 500 TAB at 08:19

## 2021-06-28 VITALS
TEMPERATURE: 98.2 F | SYSTOLIC BLOOD PRESSURE: 116 MMHG | HEIGHT: 66 IN | DIASTOLIC BLOOD PRESSURE: 50 MMHG | WEIGHT: 170.64 LBS | OXYGEN SATURATION: 94 % | HEART RATE: 81 BPM | RESPIRATION RATE: 16 BRPM | BODY MASS INDEX: 27.42 KG/M2

## 2021-06-28 LAB
GLUCOSE BLDC GLUCOMTR-MCNC: 111 MG/DL (ref 70–130)
GLUCOSE BLDC GLUCOMTR-MCNC: 224 MG/DL (ref 70–130)

## 2021-06-28 PROCEDURE — 97530 THERAPEUTIC ACTIVITIES: CPT

## 2021-06-28 PROCEDURE — 97535 SELF CARE MNGMENT TRAINING: CPT

## 2021-06-28 PROCEDURE — 94799 UNLISTED PULMONARY SVC/PX: CPT

## 2021-06-28 PROCEDURE — 99316 NF DSCHRG MGMT 30 MIN+: CPT | Performed by: PHYSICIAN ASSISTANT

## 2021-06-28 PROCEDURE — 82962 GLUCOSE BLOOD TEST: CPT

## 2021-06-28 PROCEDURE — 97116 GAIT TRAINING THERAPY: CPT

## 2021-06-28 PROCEDURE — 97110 THERAPEUTIC EXERCISES: CPT

## 2021-06-28 PROCEDURE — 63710000001 INSULIN LISPRO (HUMAN) PER 5 UNITS: Performed by: PHYSICIAN ASSISTANT

## 2021-06-28 PROCEDURE — 63710000001 INSULIN LISPRO (HUMAN) PER 5 UNITS: Performed by: INTERNAL MEDICINE

## 2021-06-28 RX ADMIN — INSULIN LISPRO 4 UNITS: 100 INJECTION, SOLUTION INTRAVENOUS; SUBCUTANEOUS at 12:11

## 2021-06-28 RX ADMIN — LEVOTHYROXINE SODIUM 112 MCG: 0.11 TABLET ORAL at 06:03

## 2021-06-28 RX ADMIN — INSULIN LISPRO 3 UNITS: 100 INJECTION, SOLUTION INTRAVENOUS; SUBCUTANEOUS at 12:12

## 2021-06-28 RX ADMIN — DOCUSATE SODIUM 100 MG: 100 CAPSULE, LIQUID FILLED ORAL at 09:21

## 2021-06-28 RX ADMIN — CYANOCOBALAMIN TAB 500 MCG 500 MCG: 500 TAB at 09:21

## 2021-06-28 RX ADMIN — POLYETHYLENE GLYCOL 3350 17 G: 17 POWDER, FOR SOLUTION ORAL at 09:21

## 2021-06-28 RX ADMIN — Medication 5000 UNITS: at 09:21

## 2021-06-30 ENCOUNTER — TELEPHONE (OUTPATIENT)
Dept: FAMILY MEDICINE CLINIC | Facility: CLINIC | Age: 81
End: 2021-06-30

## 2021-06-30 ENCOUNTER — OFFICE VISIT (OUTPATIENT)
Dept: ORTHOPEDIC SURGERY | Facility: CLINIC | Age: 81
End: 2021-06-30

## 2021-06-30 VITALS — BODY MASS INDEX: 27.32 KG/M2 | HEIGHT: 66 IN | WEIGHT: 170 LBS

## 2021-06-30 DIAGNOSIS — Z98.890 S/P ROTATOR CUFF SURGERY: Primary | ICD-10-CM

## 2021-06-30 DIAGNOSIS — Z47.89 AFTERCARE FOLLOWING SURGERY OF THE MUSCULOSKELETAL SYSTEM: Primary | ICD-10-CM

## 2021-06-30 PROCEDURE — 99024 POSTOP FOLLOW-UP VISIT: CPT | Performed by: PHYSICIAN ASSISTANT

## 2021-06-30 RX ORDER — HYDROCODONE BITARTRATE AND ACETAMINOPHEN 7.5; 325 MG/1; MG/1
1 TABLET ORAL EVERY 8 HOURS PRN
Qty: 20 TABLET | Refills: 0 | Status: SHIPPED | OUTPATIENT
Start: 2021-06-30 | End: 2021-08-02 | Stop reason: SDUPTHER

## 2021-06-30 NOTE — PROGRESS NOTES
"Chief Complaint  Follow-up of the Right Shoulder and Suture / Staple Removal    Subjective          Sabi Haynes presents to [facility] for s/p right shoulder arthroscopy with RCR, AMANDO and PATIENCE on 06-17-21 by Dr. Quintero. She states she has been compliant with gentle ROM exercises of shoulder and elbow. She states her pain is controlled with medication.     Objective   Vital Signs:   Ht 167.6 cm (66\")   Wt 77.1 kg (170 lb)   BMI 27.44 kg/m²       Physical Exam  Constitutional:       Appearance: Normal appearance. He is well-developed and normal weight.   HENT:      Head: Normocephalic.      Right Ear: Hearing and external ear normal.      Left Ear: Hearing and external ear normal.      Nose: Nose normal.   Eyes:      Conjunctiva/sclera: Conjunctivae normal.   Cardiovascular:      Rate and Rhythm: Normal rate.   Pulmonary:      Effort: Pulmonary effort is normal.      Breath sounds: No wheezing or rales.   Abdominal:      Palpations: Abdomen is soft.      Tenderness: There is no abdominal tenderness.   Musculoskeletal:      Cervical back: Normal range of motion.   Skin:     Findings: No rash.   Neurological:      Mental Status: He is alert and oriented to person, place, and time.   Psychiatric:         Mood and Affect: Mood and affect normal.         Judgment: Judgment normal.     Ortho Exam  Right shoulder: Tenderness on superior aspect of shoulder.  Incision well healing.  Skin is dry and intact.  Mild swelling.  Sensation is intact.  Neurovascular intact.  Radial and ulnar pulse are 2+.    Result Review :            Imaging Results (Most Recent)     None                Assessment and Plan    Problem List Items Addressed This Visit        Musculoskeletal and Injuries    Aftercare following right shoulder arthroscopy - Primary          Follow Up   Return in about 4 weeks (around 7/28/2021).  Patient Instructions   Discussed treatment course with patient. Recommend following up with PCP " this week to monitor sore obtained from hospital.  Recommend use of sling x 2 weeks  No heavy pushing/pulling/overhead lifting  PT begin on Friday     Patient was given instructions and counseling regarding her condition or for health maintenance advice. Please see specific information pulled into the AVS if appropriate.

## 2021-06-30 NOTE — PATIENT INSTRUCTIONS
Discussed treatment course with patient. Recommend following up with PCP this week to monitor sore obtained from hospital.  Recommend use of sling x 2 weeks  No heavy pushing/pulling/overhead lifting  PT begin on Friday

## 2021-07-01 ENCOUNTER — TELEPHONE (OUTPATIENT)
Dept: FAMILY MEDICINE CLINIC | Facility: CLINIC | Age: 81
End: 2021-07-01

## 2021-07-01 NOTE — TELEPHONE ENCOUNTER
Caller: Sabi Haynes    Relationship: Self    Best call back number: [phone]    What medication are you requesting: MEDICATION FOR A BED SORE    What are your current symptoms: BEDSORE ON RIGHT SIDE OF HER BOTTOM     How long have you been experiencing symptoms: ABOUT 11 DAYS    Have you had these symptoms before:    [] Yes  [x] No    Have you been treated for these symptoms before:   [] Yes  [x] No    If a prescription is needed, what is your preferred pharmacy and phone number:      [facility]  [address] [place] [address]  [phone]        Additional notes: PATIENT STATES THAT SHE WAS IN THE HOSPITAL AND THAT IS WHEN SHE STARTED HAVING ISSUES WITH A BEDSORE.     PLEASE CALL PATIENT AS SOON AS THE PRESCRIPTION IS READY.    HUB WAS UNABLE TO TRANSFER.

## 2021-07-02 ENCOUNTER — TELEPHONE (OUTPATIENT)
Dept: ORTHOPEDIC SURGERY | Facility: CLINIC | Age: 81
End: 2021-07-02

## 2021-07-02 NOTE — THERAPY DISCHARGE NOTE
SNF - Occupational Therapy Discharge  [facility]     Patient Name: Sabi Haynes  : 1940  MRN: [id number]  Today's Date: 2021  Onset of Illness/Injury or Date of Surgery: 21 (right rotator cuff repair on 21)            Admit Date: 2021       ICD-10-CM ICD-9-CM   1. Difficulty walking  R26.2 719.7   2. Decreased activities of daily living (ADL)  Z78.9 V49.89     Patient Active Problem List   Diagnosis   • Renal calculus, left   • Rotator cuff tear   • S/P right rotator cuff repair   • Bladder disorder   • Hyperlipemia   • Hypertension   • Diabetes mellitus (CMS/HCC)   • Aftercare following right shoulder arthroscopy     Past Medical History:   Diagnosis Date   • Acid reflux    • Arthritis    • Bladder disorder    • Bladder paralysis     DUE TO SPINAL STENOSIS   • Blind spot scotoma, left    • Condition not found     ulcer   • DDD (degenerative disc disease), cervical    • DDD (degenerative disc disease), lumbar    • Diabetes mellitus (CMS/HCC)    • Gastric ulcer    • Hearing loss    • Heart attack (CMS/HCC)     DENIES CP BUT GETS SOA WITH EXERTION . OCCURRED IN THE 1970S. SAW DR BAUTISTA IN [place]   • Heart disease    • Hemorrhoids    • History of confusion    • History of migraine headaches    • Hyperlipemia    • Hyperlipidemia    • Hypothyroidism    • Incontinence of bowel     DUE TO SPINAL STENOSIS   • Incontinence of urine     DUE TO SPINAL STENOSIS   • Kidney disease    • Left hand pain    • Limb swelling    • Macular degeneration    • PONV (postoperative nausea and vomiting)    • Primary osteoarthritis of right knee 2018   • Recent total retinal detachment    • Renal calculi    • Right BBB/left ant fasc block    • Ringing in ears    • Rotator cuff tear 2021   • S/P arthroscopic partial medial meniscectomy 2017    right knee   • S/P medial meniscectomy of right knee 10/11/2017   • Seasonal allergies    • Self-catheterizes urinary bladder     DUE TO SPINAL  STENOSIS   • Short-term memory loss     MILD   • Shortness of breath    • Spinal stenosis    • Stroke (CMS/HCC)     no residual   • Thyroid disorder    • Vitamin D deficiency      Past Surgical History:   Procedure Laterality Date   • BLADDER SURGERY  2002   • CARPAL TUNNEL RELEASE     • CHOLECYSTECTOMY     • COLONOSCOPY     • CYST REMOVAL     • EXTRACORPOREAL SHOCK WAVE LITHOTRIPSY (ESWL) Left 9/13/2019    Procedure: EXTRACORPOREAL SHOCKWAVE LITHOTRIPSY;  Surgeon: Pipe Pat MD;  Location: [facility];  Service: Urology   • EYE SURGERY      CATARACT SURGERY   • GALLBLADDER SURGERY     • HYSTERECTOMY     • HYSTERECTOMY  1969   • JOINT REPLACEMENT      joint surgery   • KNEE SURGERY  08/22   • LAPAROSCOPIC TUBAL LIGATION     • OTHER SURGICAL HISTORY      Artificial joint/limbs   • SHOULDER ARTHROSCOPY W/ ROTATOR CUFF REPAIR Right 6/17/2021    Procedure: RIGHT SHOULDER ARTHROSCOPY, SUBACROMIAL DECOMPRESSION, DISTAL CLAVICULECTOMY, ROTATOR CUFF REPAIR;  Surgeon: Henri Quintero MD;  Location: [facility] OR [facility];  Service: Orthopedics;  Laterality: Right;   • TEAR DUCT SURGERY     • TOTAL HIP ARTHROPLASTY  2010    HAS HAD BOTH HIPS REPLACED   • VARICOSE VEIN SURGERY            OT ASSESSMENT FLOWSHEET (last 12 hours)      OT Evaluation and Treatment    No documentation.         Occupational Therapy Education                 Title: PT OT SLP Therapies (Done)     Topic: Occupational Therapy (Done)     Point: ADL training (Done)     Description:   Instruct learner(s) on proper safety adaptation and remediation techniques during self care or transfers.   Instruct in proper use of assistive devices.              Learning Progress Summary           Patient Acceptance, E,D, VU,NR by  at 6/24/2021 0170    Comment: Beatriz   One handed techniques                   Point: Home exercise program (Done)     Description:   Instruct learner(s) on appropriate technique for monitoring, assisting and/or progressing therapeutic  exercises/activities.              Learning Progress Summary           Patient Acceptance, E,D, VU,NR by  at 6/24/2021 1352    Comment: [place]   One handed techniques                   Point: Precautions (Done)     Description:   Instruct learner(s) on prescribed precautions during self-care and functional transfers.              Learning Progress Summary           Patient Acceptance, E,D, VU,NR by  at 6/24/2021 1352    Comment: [place]   One handed techniques                   Point: Body mechanics (Done)     Description:   Instruct learner(s) on proper positioning and spine alignment during self-care, functional mobility activities and/or exercises.              Learning Progress Summary           Patient Acceptance, E,D, VU,NR by  at 6/24/2021 1352    Comment: Beatriz   One handed techniques                               User Key     Initials Effective Dates Name Provider Type Discipline     06/16/21 -  Margie Nichols OT Occupational Therapist OT                OT Recommendation and Plan     Plan of Care Review  Progress: improving  Outcome Summary: Patient is eager to discharge, stating that she is leaving repeatedly during session. Therapist re-educated her on one arm techniques during ADLs, shoulder precautions, and donning/doffing of upper extremity immobilizer. Per nursing patient is required to straight cath herself upon discharge at home, therapist instructed patient on one arm techniques and emphaized the importance of maintaining shoulder precautions and potential need for assistance fror her daughter in-law upon discharge.  Outcome Summary: Patient is eager to discharge, stating that she is leaving repeatedly during session. Therapist re-educated her on one arm techniques during ADLs, shoulder precautions, and donning/doffing of upper extremity immobilizer. Per nursing patient is required to straight cath herself upon discharge at home, therapist instructed patient on one arm techniques and  emphaized the importance of maintaining shoulder precautions and potential need for assistance fror her daughter in-law upon discharge.             Time Calculation:     Timed Therapy Charges  Total Units: 4    Charges  Total Units: 4    Procedure Name Documented Minutes Units Code    HC OT SELF CARE/MGMT/TRAIN EA 15 MIN 25  2    86986 (CPT®)      HC OT THERAPEUTIC ACT EA 15 MIN 15  1    06535 (CPT®)      HC OT THER PROC EA 15 MIN 15  1    12772 (CPT®)               Documented Minutes  Total Minutes: 55    Therapy Provided Minutes    24326 - OT Self Care/Mgmt Minutes 25    46852 - OT Therapeutic Exercise Minutes 15    82487 - OT Therapeutic Activity Minutes 15                       OT Discharge Summary  Reason for Discharge: Discharge from facility, Per MD order (at patient's request)  Outcomes Achieved: Patient able to partially acheive established goals  Discharge Destination: Home with assist, Home with outpatient services    Morelia Thapa OT  7/2/2021

## 2021-07-02 NOTE — TELEPHONE ENCOUNTER
Called and spoke with patient.  Patient very upset.  Explained to patient that Dr. Villalpando is out of the office.  Strongly advised patient to go to ER or Urgent care to be seen for bed sore.  Patient stated she was given cream on Monday that is not helping.  Once again advised patient to go to ER or Urgent care so she could be seen by someone who can determine what would be the best course of treatment for her.  Patient was calm by the end of the conversation and expressed understanding of the advise.

## 2021-07-02 NOTE — TELEPHONE ENCOUNTER
Caller: Sabi Haynes    Relationship to patient: Self    Best call back number: [phone]    Patient is needing: PATIENT CALLED TO CHECK ON THE STATUS OF THIS REQUEST.     DUE TO PATIENT BEING DISGRUNTLED, AN ATTEMPT WAS MADE TO WARM TRANSFER.     I WAS UNABLE TO REACH THE OFFICE.     UPON INFORMING THE PATIENT I WAS UNABLE TO WARM TRANSFER, PATIENT BECAME MORE DISGRUNTLED AND EXPRESSED THAT SHE IS IN SEVERE PAIN AND SPOKE ABOUT THE POSSIBILITY OF DEATH.     DUE TO THIS, A SECOND ATTEMPT TO WARM TRANSFER WAS MADE.     I WAS UNABLE TO CONNECT WITH THE OFFICE AND PATIENT DISCONNECTED.     PLEASE CALL AND ADVISE AS SOON AS POSSIBLE.

## 2021-07-02 NOTE — TELEPHONE ENCOUNTER
REQUEST FOR MEDICINE:    Pt IS REQUESTING TO SEE IF DR OLSEN CAN CALL IN SOME MEDICINE FOR Pt'S BED SORE, AS THE PCP WILL NOT CALL IN RX UNTIL SEEN, AND Pt APPT IS 7-.     TOLD Pt THAT NORMALLY FALLS UNDER PCP, BUT Pt WAS ADAMANT THAT SOMEONE BE ASKED.

## 2021-07-06 ENCOUNTER — TREATMENT (OUTPATIENT)
Dept: PHYSICAL THERAPY | Facility: CLINIC | Age: 81
End: 2021-07-06

## 2021-07-06 DIAGNOSIS — Z47.89 AFTERCARE FOLLOWING SURGERY OF THE MUSCULOSKELETAL SYSTEM: Primary | ICD-10-CM

## 2021-07-06 DIAGNOSIS — Z98.890 S/P RIGHT ROTATOR CUFF REPAIR: ICD-10-CM

## 2021-07-06 DIAGNOSIS — Z98.890 STATUS POST SUBACROMIAL DECOMPRESSION: ICD-10-CM

## 2021-07-06 PROCEDURE — 97110 THERAPEUTIC EXERCISES: CPT | Performed by: OCCUPATIONAL THERAPIST

## 2021-07-06 PROCEDURE — 97165 OT EVAL LOW COMPLEX 30 MIN: CPT | Performed by: OCCUPATIONAL THERAPIST

## 2021-07-06 NOTE — PROGRESS NOTES
Occupational Therapy Initial Evaluation and Plan of Care    Patient: Sabi Haynes   : 1940  Diagnosis/ICD-10 Code:  Aftercare following surgery of the musculoskeletal system [Z47.89]  Referring practitioner: No ref. provider found  Date of Initial Visit: 2021  Today's Date: 2021  Patient seen for 1 sessions           Subjective Questionnaire: QuickDASH: 38/55 (60-79%)      Subjective Evaluation    History of Present Illness  Date of surgery: 2021  Mechanism of injury: Pt is a 81 y/o RHD female who presents to therapy following rotator cuff repair, subacromial decompression, distal claviculectomy, shoulder arthroscopy on 21. Pt reports 4 years ago she fell in the bath tub and hit her shoulder. She reports 2 years ago it started getting worse and finally decided it was time to undergo surgery.    Pain  Current pain ratin  At best pain ratin  At worst pain ratin  Location: right forearm  Quality: sore.  Relieving factors: ice  Progression: improved    Hand dominance: right    Diagnostic Tests  MRI studies: abnormal    Treatments  Previous treatment: physical therapy (occupational therapy)  Discharged from (in last 30 days): inpatient hospitalization  Patient Goals  Patient goals for therapy: decreased pain, increased strength, independence with ADLs/IADLs, return to sport/leisure activities and increased motion             Objective          Neurological Testing     Additional Neurological Details  WNL B shoulders.    Active Range of Motion     Left Elbow   Normal active range of motion    Right Elbow   Normal active range of motion    Left Wrist   Normal active range of motion    Right Wrist   Normal active range of motion    Additional Active Range of Motion Details  Pt able to make full, composite fist.    Passive Range of Motion     Right Shoulder   Flexion: 80 degrees   External rotation 0°: 15 degrees     Additional Passive Range of Motion Details  Pt does not feel pain  with passive flexion but pt displays tightness that limits further movement.          Assessment & Plan     Assessment  Impairments: abnormal or restricted ROM, activity intolerance, impaired physical strength, lacks appropriate home exercise program and pain with function  Assessment details: Pt will benefit from skilled OT secondary to decreased ROM in right shoulder which limits pt ability to complete self care tasks.  Prognosis: fair  Functional Limitations: carrying objects, lifting, sleeping, pulling, pushing, moving in bed, reaching behind back and reaching overhead  Goals  Plan Goals: SHOULDER  PROBLEMS:     1. The patient has limited ROM of the right shoulder.    LTG 1: 12 weeks:  The patient will demonstrate 140 degrees of right shoulder flexion, 140 degrees of shoulder abduction, 60 degrees of shoulder external rotation, and 60 degrees of shoulder internal rotation to allow the patient to reach into upper kitchen cabinets and manipulate clothing behind the back with greater ease.    STATUS:  New   STG 1a: 8 weeks:  The patient will demonstrate 140 degrees of passive right shoulder flexion, 140 degrees of passive right shoulder abduction, 60 degrees of passive right shoulder external rotation, and 60 degrees of passive right shoulder internal rotation.    STATUS:  New   TREATMENT: Manual therapy, therapeutic exercise, home exercise instruction, and modalities as needed to include: electrical stimulation, ultrasound, moist heat, and ice.    2. The patient has limited strength of the right shoulder.   LTG 2: 12 weeks:  The patient will demonstrate 4 /5 strength for right shoulder flexion, abduction, external rotation, and internal rotation in order to demonstrate improved shoulder stability.    STATUS:  New   STG 2a: 8 weeks:  The patient will demonstrate 3 /5 strength for right shoulder flexion, abduction, external rotation, and internal rotation.    STATUS:  New   TREATMENT: Manual therapy, therapeutic  exercise, home exercise instruction, and modalities as needed to include: electrical stimulation, ultrasound, moist heat, and ice.     3. Carrying, Moving, and Handling Objects Functional Limitation     LTG 3: 12 weeks:  The patient will demonstrate 1-19 % limitation by achieving a score of 19 on the QuickDASH.    STATUS:  New   STG 3a: 8 weeks:  The patient will demonstrate 20-39 % limitation by achieving a score of 27 on the QuickDASH.      STATUS:  New   TREATMENT:  Manual therapy, therapeutic exercise, home exercise instruction, and modalities as needed to include: moist heat, electrical stimulation, and ultrasound.    Plan  Therapy options: will be seen for skilled physical therapy services  Planned modality interventions: cryotherapy, TENS and thermotherapy (hydrocollator packs)  Planned therapy interventions: body mechanics training, flexibility, functional ROM exercises, home exercise program, manual therapy, strengthening and stretching  Frequency: 3x week  Duration in weeks: 12  Treatment plan discussed with: patient        Timed:         Manual Therapy:    0     mins  15390;     Therapeutic Exercise:    8     mins  45089;     Neuromuscular Karen:    0    mins  51833;    Therapeutic Activity:     0     mins  39043;     Ultrasound:     0     mins  36642;    Self-care  __0__ mins 10504    Un-Timed:  Electrical Stimulation:    0     mins  46358 ( );  Low Eval     35     Mins  45948  Mod Eval     0     Mins  48499  High Eval                       0     Mins  31945  Hot/cold packs    0     mins  63699    Timed Treatment:   8   mins   Total Treatment:     43   mins    OT SIGNATURE: Mo Hall OT   DATE TREATMENT INITIATED: 7/6/2021    Initial Certification  Certification Period: 10/4/2021  I certify that the therapy services are furnished while this patient is under my care.  The services outlined above are required by this patient, and will be reviewed every 90 days.     PHYSICIAN:       DATE:      Please sign and return via fax to [phone] . Thank you, [facility].

## 2021-07-08 ENCOUNTER — TREATMENT (OUTPATIENT)
Dept: PHYSICAL THERAPY | Facility: CLINIC | Age: 81
End: 2021-07-08

## 2021-07-08 DIAGNOSIS — Z98.890 S/P RIGHT ROTATOR CUFF REPAIR: ICD-10-CM

## 2021-07-08 DIAGNOSIS — Z98.890 STATUS POST SUBACROMIAL DECOMPRESSION: ICD-10-CM

## 2021-07-08 DIAGNOSIS — Z47.89 AFTERCARE FOLLOWING SURGERY OF THE MUSCULOSKELETAL SYSTEM: Primary | ICD-10-CM

## 2021-07-08 PROCEDURE — 97110 THERAPEUTIC EXERCISES: CPT | Performed by: OCCUPATIONAL THERAPIST

## 2021-07-08 PROCEDURE — 97140 MANUAL THERAPY 1/> REGIONS: CPT | Performed by: OCCUPATIONAL THERAPIST

## 2021-07-08 NOTE — PROGRESS NOTES
" Occupational Therapy Daily Progress Note        Patient: Sabi Haynes   : 1940  Diagnosis/ICD-10 Code:  Aftercare following surgery of the musculoskeletal system [Z47.89]  Referring practitioner: No ref. provider found  Date of Initial Visit: Type: THERAPY  Noted: 2021  Today's Date: 2021  Patient seen for 2 sessions             Subjective Evaluation    Pain  Current pain ratin  Location: Right arm         Sabi Haynes reports: \"It's a little sore\"    Objective          Neurological Testing     Additional Neurological Details  WNL B shoulders.    Active Range of Motion     Left Elbow   Normal active range of motion    Right Elbow   Normal active range of motion    Left Wrist   Normal active range of motion    Right Wrist   Normal active range of motion    Additional Active Range of Motion Details  Pt able to make full, composite fist.    Passive Range of Motion     Right Shoulder   Flexion: 80 degrees   Abduction: 70 degrees   External rotation 0°: 15 degrees     Additional Passive Range of Motion Details  Pt does not feel pain with passive flexion but pt displays tightness that limits further movement.        See Exercise, Manual, and Modality Logs for complete treatment.       Assessment/Plan    Progress per Plan of Care           Timed:  Manual Therapy:    15     mins  72523;  Therapeutic Exercise:    8     mins  77465;     Neuromuscular Karen:    0    mins  75469;    Therapeutic Activity:     0     mins  71386;     Ultrasound:     0     mins  90076;    Electrical Stimulation:    0     mins  78828;    Untimed:  Electrical Stimulation:    0     mins  23857 ( );  Fluidotherapy     0     mins  08650  Hot/cold pack     0     mins  23553    Timed Treatment:   23   mins   Total Treatment:     23   mins        Mo Hall OT  Occupational Therapist  "

## 2021-07-13 ENCOUNTER — TREATMENT (OUTPATIENT)
Dept: PHYSICAL THERAPY | Facility: CLINIC | Age: 81
End: 2021-07-13

## 2021-07-13 DIAGNOSIS — Z47.89 AFTERCARE FOLLOWING SURGERY OF THE MUSCULOSKELETAL SYSTEM: Primary | ICD-10-CM

## 2021-07-13 DIAGNOSIS — Z98.890 STATUS POST SUBACROMIAL DECOMPRESSION: ICD-10-CM

## 2021-07-13 DIAGNOSIS — Z98.890 S/P RIGHT ROTATOR CUFF REPAIR: ICD-10-CM

## 2021-07-13 PROCEDURE — 97140 MANUAL THERAPY 1/> REGIONS: CPT | Performed by: OCCUPATIONAL THERAPIST

## 2021-07-13 PROCEDURE — 97110 THERAPEUTIC EXERCISES: CPT | Performed by: OCCUPATIONAL THERAPIST

## 2021-07-13 NOTE — PROGRESS NOTES
" Occupational Therapy Daily Progress Note        Patient: Sabi Haynes   : 1940  Diagnosis/ICD-10 Code:  Aftercare following surgery of the musculoskeletal system [Z47.89]  Referring practitioner: No ref. provider found  Date of Initial Visit: Type: THERAPY  Noted: 2021  Today's Date: 2021  Patient seen for 3 sessions             Subjective   Sabi Haynes reports: \"My shoulder is doing good but my arm below it has been hurting.\"    Objective          Neurological Testing     Additional Neurological Details  WNL B shoulders.    Active Range of Motion     Left Elbow   Normal active range of motion    Right Elbow   Normal active range of motion    Left Wrist   Normal active range of motion    Right Wrist   Normal active range of motion    Additional Active Range of Motion Details  Pt able to make full, composite fist.    Passive Range of Motion     Right Shoulder   Flexion: 80 degrees   Abduction: 80 degrees   External rotation 0°: 15 degrees     Additional Passive Range of Motion Details  Pt does not feel pain with passive flexion but pt displays tightness that limits further movement.        See Exercise, Manual, and Modality Logs for complete treatment.       Assessment/Plan    Progress per Plan of Care           Timed:  Manual Therapy:    15     mins  04718;  Therapeutic Exercise:    8     mins  45495;     Neuromuscular Karen:    0    mins  07496;    Therapeutic Activity:     0     mins  28578;     Ultrasound:     0     mins  05114;    Electrical Stimulation:    0     mins  69803;    Untimed:  Electrical Stimulation:    0     mins  02057 ( );  Fluidotherapy     0     mins  16962  Hot/cold pack     0     mins  60936    Timed Treatment:   23   mins   Total Treatment:     23   mins        Mo Hall OT  Occupational Therapist  "

## 2021-07-15 ENCOUNTER — TREATMENT (OUTPATIENT)
Dept: PHYSICAL THERAPY | Facility: CLINIC | Age: 81
End: 2021-07-15

## 2021-07-15 VITALS — HEIGHT: 66 IN | BODY MASS INDEX: 29.73 KG/M2 | WEIGHT: 185 LBS

## 2021-07-15 DIAGNOSIS — Z47.89 AFTERCARE FOLLOWING SURGERY OF THE MUSCULOSKELETAL SYSTEM: Primary | ICD-10-CM

## 2021-07-15 DIAGNOSIS — Z98.890 S/P RIGHT ROTATOR CUFF REPAIR: ICD-10-CM

## 2021-07-15 DIAGNOSIS — Z98.890 STATUS POST SUBACROMIAL DECOMPRESSION: ICD-10-CM

## 2021-07-15 PROCEDURE — 97110 THERAPEUTIC EXERCISES: CPT | Performed by: OCCUPATIONAL THERAPIST

## 2021-07-15 PROCEDURE — 97140 MANUAL THERAPY 1/> REGIONS: CPT | Performed by: OCCUPATIONAL THERAPIST

## 2021-07-15 NOTE — PROGRESS NOTES
" Occupational Therapy Daily Progress Note        Patient: Sabi Haynes   : 1940  Diagnosis/ICD-10 Code:  Aftercare following surgery of the musculoskeletal system [Z47.89]  Referring practitioner: Fuad Squires MD  Date of Initial Visit: Type: THERAPY  Noted: 2021  Today's Date: 7/15/2021  Patient seen for 4 sessions             Subjective Evaluation    Pain  Current pain ratin  Location: Right shoulder         Sabi Haynes reports: \"It's a little sore today\"    Objective          Neurological Testing     Additional Neurological Details  WNL B shoulders.    Active Range of Motion     Left Elbow   Normal active range of motion    Right Elbow   Normal active range of motion    Left Wrist   Normal active range of motion    Right Wrist   Normal active range of motion    Additional Active Range of Motion Details  Pt able to make full, composite fist.    Passive Range of Motion     Right Shoulder   Flexion: 85 degrees   Abduction: 80 degrees   External rotation 0°: 15 degrees     Additional Passive Range of Motion Details  Pt does not feel pain with passive flexion but pt displays tightness that limits further movement.        See Exercise, Manual, and Modality Logs for complete treatment.       Assessment/Plan    Progress per Plan of Care           Timed:  Manual Therapy:    15     mins  70006;  Therapeutic Exercise:    8     mins  37464;     Neuromuscular Karen:    0    mins  51125;    Therapeutic Activity:     0     mins  64924;     Ultrasound:     0     mins  55770;    Electrical Stimulation:    0     mins  67481;    Untimed:  Electrical Stimulation:    0     mins  20997 ( );  Fluidotherapy     0     mins  33344  Hot/cold pack     0     mins  24976    Timed Treatment:   23   mins   Total Treatment:     23   mins        Mo Hall OT  Occupational Therapist  "

## 2021-07-20 ENCOUNTER — OFFICE VISIT (OUTPATIENT)
Dept: FAMILY MEDICINE CLINIC | Facility: CLINIC | Age: 81
End: 2021-07-20

## 2021-07-20 ENCOUNTER — TREATMENT (OUTPATIENT)
Dept: PHYSICAL THERAPY | Facility: CLINIC | Age: 81
End: 2021-07-20

## 2021-07-20 VITALS
HEART RATE: 76 BPM | BODY MASS INDEX: 29.41 KG/M2 | DIASTOLIC BLOOD PRESSURE: 88 MMHG | SYSTOLIC BLOOD PRESSURE: 138 MMHG | TEMPERATURE: 97.6 F | HEIGHT: 66 IN | WEIGHT: 183 LBS | OXYGEN SATURATION: 99 % | RESPIRATION RATE: 14 BRPM

## 2021-07-20 DIAGNOSIS — M25.511 RIGHT SHOULDER PAIN, UNSPECIFIED CHRONICITY: ICD-10-CM

## 2021-07-20 DIAGNOSIS — Z47.89 AFTERCARE FOLLOWING SURGERY OF THE MUSCULOSKELETAL SYSTEM: Primary | ICD-10-CM

## 2021-07-20 DIAGNOSIS — I10 ESSENTIAL HYPERTENSION: ICD-10-CM

## 2021-07-20 DIAGNOSIS — Z98.890 S/P RIGHT ROTATOR CUFF REPAIR: ICD-10-CM

## 2021-07-20 DIAGNOSIS — E11.65 TYPE 2 DIABETES MELLITUS WITH HYPERGLYCEMIA, WITH LONG-TERM CURRENT USE OF INSULIN (HCC): Primary | ICD-10-CM

## 2021-07-20 DIAGNOSIS — Z79.4 TYPE 2 DIABETES MELLITUS WITH HYPERGLYCEMIA, WITH LONG-TERM CURRENT USE OF INSULIN (HCC): Primary | ICD-10-CM

## 2021-07-20 DIAGNOSIS — E53.8 VITAMIN B12 DEFICIENCY: ICD-10-CM

## 2021-07-20 DIAGNOSIS — K59.09 CHRONIC CONSTIPATION: ICD-10-CM

## 2021-07-20 DIAGNOSIS — Z98.890 STATUS POST SUBACROMIAL DECOMPRESSION: ICD-10-CM

## 2021-07-20 PROCEDURE — 99214 OFFICE O/P EST MOD 30 MIN: CPT | Performed by: FAMILY MEDICINE

## 2021-07-20 PROCEDURE — 97110 THERAPEUTIC EXERCISES: CPT | Performed by: OCCUPATIONAL THERAPIST

## 2021-07-20 PROCEDURE — 97140 MANUAL THERAPY 1/> REGIONS: CPT | Performed by: OCCUPATIONAL THERAPIST

## 2021-07-20 PROCEDURE — 96372 THER/PROPH/DIAG INJ SC/IM: CPT | Performed by: FAMILY MEDICINE

## 2021-07-20 RX ORDER — CYANOCOBALAMIN 1000 UG/ML
1000 INJECTION, SOLUTION INTRAMUSCULAR; SUBCUTANEOUS ONCE
Status: COMPLETED | OUTPATIENT
Start: 2021-07-20 | End: 2021-07-20

## 2021-07-20 RX ADMIN — CYANOCOBALAMIN 1000 MCG: 1000 INJECTION, SOLUTION INTRAMUSCULAR; SUBCUTANEOUS at 15:34

## 2021-07-21 ENCOUNTER — TELEPHONE (OUTPATIENT)
Dept: FAMILY MEDICINE CLINIC | Facility: CLINIC | Age: 81
End: 2021-07-21

## 2021-07-22 ENCOUNTER — TREATMENT (OUTPATIENT)
Dept: PHYSICAL THERAPY | Facility: CLINIC | Age: 81
End: 2021-07-22

## 2021-07-22 ENCOUNTER — LAB (OUTPATIENT)
Dept: FAMILY MEDICINE CLINIC | Facility: CLINIC | Age: 81
End: 2021-07-22

## 2021-07-22 DIAGNOSIS — Z98.890 STATUS POST SUBACROMIAL DECOMPRESSION: ICD-10-CM

## 2021-07-22 DIAGNOSIS — N39.0 URINARY TRACT INFECTION ASSOCIATED WITH CATHETERIZATION OF URINARY TRACT, UNSPECIFIED INDWELLING URINARY CATHETER TYPE, INITIAL ENCOUNTER (HCC): Primary | ICD-10-CM

## 2021-07-22 DIAGNOSIS — T83.511A URINARY TRACT INFECTION ASSOCIATED WITH CATHETERIZATION OF URINARY TRACT, UNSPECIFIED INDWELLING URINARY CATHETER TYPE, INITIAL ENCOUNTER (HCC): Primary | ICD-10-CM

## 2021-07-22 DIAGNOSIS — Z47.89 AFTERCARE FOLLOWING SURGERY OF THE MUSCULOSKELETAL SYSTEM: Primary | ICD-10-CM

## 2021-07-22 DIAGNOSIS — Z98.890 S/P RIGHT ROTATOR CUFF REPAIR: ICD-10-CM

## 2021-07-22 LAB
BACTERIA UR QL AUTO: ABNORMAL /HPF
BILIRUB UR QL STRIP: NEGATIVE
CLARITY UR: ABNORMAL
COLOR UR: YELLOW
GLUCOSE UR STRIP-MCNC: NEGATIVE MG/DL
HGB UR QL STRIP.AUTO: ABNORMAL
HYALINE CASTS UR QL AUTO: ABNORMAL /LPF
KETONES UR QL STRIP: NEGATIVE
LEUKOCYTE ESTERASE UR QL STRIP.AUTO: ABNORMAL
NITRITE UR QL STRIP: NEGATIVE
PH UR STRIP.AUTO: 5.5 [PH] (ref 5–8)
PROT UR QL STRIP: NEGATIVE
RBC # UR: ABNORMAL /HPF
REF LAB TEST METHOD: ABNORMAL
SP GR UR STRIP: 1.01 (ref 1–1.03)
SQUAMOUS #/AREA URNS HPF: ABNORMAL /HPF
UROBILINOGEN UR QL STRIP: ABNORMAL
WBC UR QL AUTO: ABNORMAL /HPF

## 2021-07-22 PROCEDURE — 81001 URINALYSIS AUTO W/SCOPE: CPT | Performed by: FAMILY MEDICINE

## 2021-07-22 PROCEDURE — 97140 MANUAL THERAPY 1/> REGIONS: CPT | Performed by: OCCUPATIONAL THERAPIST

## 2021-07-22 PROCEDURE — 87077 CULTURE AEROBIC IDENTIFY: CPT | Performed by: FAMILY MEDICINE

## 2021-07-22 PROCEDURE — 97110 THERAPEUTIC EXERCISES: CPT | Performed by: OCCUPATIONAL THERAPIST

## 2021-07-22 PROCEDURE — 87186 SC STD MICRODIL/AGAR DIL: CPT | Performed by: FAMILY MEDICINE

## 2021-07-22 PROCEDURE — 87086 URINE CULTURE/COLONY COUNT: CPT | Performed by: FAMILY MEDICINE

## 2021-07-22 NOTE — PROGRESS NOTES
" Occupational Therapy Daily Progress Note        Patient: Sabi Haynes   : 1940  Diagnosis/ICD-10 Code:  Aftercare following surgery of the musculoskeletal system [Z47.89]  Referring practitioner: Fuad Squires MD  Date of Initial Visit: Type: THERAPY  Noted: 2021  Today's Date: 2021  Patient seen for 6 sessions             Subjective   Sabi Haynes reports: \"My arm, not my shoulder, has been hurting and I think I have a lipoma\"    Objective          Neurological Testing     Additional Neurological Details  WNL B shoulders.    Active Range of Motion     Left Elbow   Normal active range of motion    Right Elbow   Normal active range of motion    Left Wrist   Normal active range of motion    Right Wrist   Normal active range of motion    Additional Active Range of Motion Details  Pt able to make full, composite fist.    Passive Range of Motion     Right Shoulder   Flexion: 100 degrees   Abduction: 95 degrees   External rotation 0°: 30 degrees     Additional Passive Range of Motion Details  Pt does not feel pain with passive flexion but pt displays tightness that limits further movement.        See Exercise, Manual, and Modality Logs for complete treatment.       Assessment/Plan  OT discussed not using right shoulder muscles with table top stretches. Pt verbalizes understanding and teachback successful.  Progress per Plan of Care           Timed:  Manual Therapy:    15     mins  50494;  Therapeutic Exercise:    8     mins  24448;     Neuromuscular Karen:    0    mins  42798;    Therapeutic Activity:     0     mins  16492;     Ultrasound:     0     mins  23507;    Electrical Stimulation:    0     mins  34749;    Untimed:  Electrical Stimulation:    0     mins  77741 (MC );  Fluidotherapy     0     mins  53309  Hot/cold pack     0     mins  29425    Timed Treatment:   23   mins   Total Treatment:     23   mins        Mo Hall OT  Occupational Therapist  "

## 2021-07-24 LAB — BACTERIA SPEC AEROBE CULT: ABNORMAL

## 2021-07-26 DIAGNOSIS — N39.0 URINARY TRACT INFECTION WITHOUT HEMATURIA, SITE UNSPECIFIED: ICD-10-CM

## 2021-07-26 DIAGNOSIS — N39.0 URINARY TRACT INFECTION WITHOUT HEMATURIA, SITE UNSPECIFIED: Primary | ICD-10-CM

## 2021-07-26 DIAGNOSIS — R31.9 HEMATURIA, UNSPECIFIED TYPE: Primary | ICD-10-CM

## 2021-07-26 RX ORDER — NITROFURANTOIN 25; 75 MG/1; MG/1
100 CAPSULE ORAL 2 TIMES DAILY
Qty: 14 CAPSULE | Refills: 0 | Status: SHIPPED | OUTPATIENT
Start: 2021-07-26 | End: 2021-07-27 | Stop reason: SDUPTHER

## 2021-07-26 RX ORDER — NITROFURANTOIN 25; 75 MG/1; MG/1
100 CAPSULE ORAL 2 TIMES DAILY
Qty: 14 CAPSULE | Refills: 0 | Status: CANCELLED | OUTPATIENT
Start: 2021-07-26 | End: 2021-08-02

## 2021-07-26 NOTE — TELEPHONE ENCOUNTER
RX WAS SENT TO [facility] WHICH WHAT WAS WHAT SHE HAD IN COMPUTER AS HER PHARMACY> CHANGED TO DOD RX NEEDS RESENT

## 2021-07-27 ENCOUNTER — TREATMENT (OUTPATIENT)
Dept: PHYSICAL THERAPY | Facility: CLINIC | Age: 81
End: 2021-07-27

## 2021-07-27 ENCOUNTER — TELEPHONE (OUTPATIENT)
Dept: FAMILY MEDICINE CLINIC | Facility: CLINIC | Age: 81
End: 2021-07-27

## 2021-07-27 DIAGNOSIS — N39.0 URINARY TRACT INFECTION WITHOUT HEMATURIA, SITE UNSPECIFIED: ICD-10-CM

## 2021-07-27 DIAGNOSIS — Z47.89 AFTERCARE FOLLOWING SURGERY OF THE MUSCULOSKELETAL SYSTEM: Primary | ICD-10-CM

## 2021-07-27 DIAGNOSIS — Z98.890 STATUS POST SUBACROMIAL DECOMPRESSION: ICD-10-CM

## 2021-07-27 DIAGNOSIS — Z98.890 S/P RIGHT ROTATOR CUFF REPAIR: ICD-10-CM

## 2021-07-27 PROCEDURE — 97140 MANUAL THERAPY 1/> REGIONS: CPT | Performed by: OCCUPATIONAL THERAPIST

## 2021-07-27 PROCEDURE — 97110 THERAPEUTIC EXERCISES: CPT | Performed by: OCCUPATIONAL THERAPIST

## 2021-07-27 RX ORDER — NITROFURANTOIN 25; 75 MG/1; MG/1
100 CAPSULE ORAL 2 TIMES DAILY
Qty: 14 CAPSULE | Refills: 0 | Status: SHIPPED | OUTPATIENT
Start: 2021-07-27 | End: 2021-08-03

## 2021-07-27 NOTE — PROGRESS NOTES
" Occupational Therapy Daily Progress Note        Patient: Sabi Haynes   : 1940  Diagnosis/ICD-10 Code:  Aftercare following surgery of the musculoskeletal system [Z47.89]  Referring practitioner: Fuad Squires MD  Date of Initial Visit: Type: THERAPY  Noted: 2021  Today's Date: 2021  Patient seen for 7 sessions             Subjective   Sabi Haynes reports: \"I've done all the exercises and they don't bother me and I've done some dishes finally too\"    Objective          Neurological Testing     Additional Neurological Details  WNL B shoulders.    Active Range of Motion     Left Elbow   Normal active range of motion    Right Elbow   Normal active range of motion    Left Wrist   Normal active range of motion    Right Wrist   Normal active range of motion    Additional Active Range of Motion Details  Pt able to make full, composite fist.    Passive Range of Motion     Right Shoulder   Flexion: 120 degrees   Abduction: 95 degrees   External rotation 0°: 30 degrees     Additional Passive Range of Motion Details  Pt does not feel pain with passive flexion but pt displays tightness that limits further movement.        See Exercise, Manual, and Modality Logs for complete treatment.       Assessment/Plan    Progress per Plan of Care           Timed:  Manual Therapy:    10     mins  73852;  Therapeutic Exercise:    13     mins  94404;     Neuromuscular Karen:    0    mins  12547;    Therapeutic Activity:     0     mins  50044;     Ultrasound:     0     mins  76900;    Electrical Stimulation:    0     mins  09640;    Untimed:  Electrical Stimulation:    0     mins  28144 ( );  Fluidotherapy     0     mins  95001  Hot/cold pack     0     mins  75580    Timed Treatment:   23   mins   Total Treatment:     23   mins        Mo Hall OT  Occupational Therapist  "

## 2021-07-28 ENCOUNTER — TELEPHONE (OUTPATIENT)
Dept: FAMILY MEDICINE CLINIC | Facility: CLINIC | Age: 81
End: 2021-07-28

## 2021-07-28 NOTE — TELEPHONE ENCOUNTER
Called and spoke with patient.  Answered all of patients questions.  Instructed patient to finish all of her antibiotics and 3 days after we will have her give us a urine sample to test for cure.  Patient verbally stated understanding.

## 2021-07-28 NOTE — TELEPHONE ENCOUNTER
Caller: Sabi Haynes    Relationship: Self    Best call back number: [phone]    What is the best time to reach you: ANYTIME    Who are you requesting to speak with (clinical staff, provider,  specific staff member): MEDICAL STAFF    What was the call regarding: PATIENT WOULD LIKE TO KNOW WHAT KIND OF INFECTION SHE HAS. SHE ALSO HAD A QUESTION ON THE MEDICATION SHE WAS PRESCRIBED. SHE STATED THAT WHEN SHE PUTS HER CATHETER IN, THERE IS A LITTLE BIT OF BLEEDING AS WELL. ATTEMPTED TO WARM TRANSFER. PLEASE CALL TO ADVISE PATIENT ON CONCERNS.

## 2021-07-29 ENCOUNTER — TREATMENT (OUTPATIENT)
Dept: PHYSICAL THERAPY | Facility: CLINIC | Age: 81
End: 2021-07-29

## 2021-07-29 DIAGNOSIS — Z47.89 AFTERCARE FOLLOWING SURGERY OF THE MUSCULOSKELETAL SYSTEM: Primary | ICD-10-CM

## 2021-07-29 DIAGNOSIS — Z98.890 STATUS POST SUBACROMIAL DECOMPRESSION: ICD-10-CM

## 2021-07-29 DIAGNOSIS — Z98.890 S/P RIGHT ROTATOR CUFF REPAIR: ICD-10-CM

## 2021-07-29 PROCEDURE — 97110 THERAPEUTIC EXERCISES: CPT | Performed by: OCCUPATIONAL THERAPIST

## 2021-07-29 PROCEDURE — 97140 MANUAL THERAPY 1/> REGIONS: CPT | Performed by: OCCUPATIONAL THERAPIST

## 2021-07-29 NOTE — PROGRESS NOTES
" Occupational Therapy Daily Progress Note        Patient: Sabi Haynes   : 1940  Diagnosis/ICD-10 Code:  Aftercare following surgery of the musculoskeletal system [Z47.89]  Referring practitioner: Fuad Squires MD  Date of Initial Visit: Type: THERAPY  Noted: 2021  Today's Date: 2021  Patient seen for 8 sessions             Subjective Evaluation    Pain  Current pain ratin         Sabi Haynes reports: \"It's been a little sore. The one where I raise it up (SROM flexion) has been hurting\"    Objective          Neurological Testing     Additional Neurological Details  WNL B shoulders.    Active Range of Motion     Left Elbow   Normal active range of motion    Right Elbow   Normal active range of motion    Left Wrist   Normal active range of motion    Right Wrist   Normal active range of motion    Additional Active Range of Motion Details  Pt able to make full, composite fist.    Passive Range of Motion     Right Shoulder   Flexion: 125 degrees   Abduction: 95 degrees   External rotation 0°: 50 degrees         See Exercise, Manual, and Modality Logs for complete treatment.       Assessment/Plan  OT again reminded pt to hold off on SROM flexion. Pt verbalizes understanding.  Progress per Plan of Care           Timed:  Manual Therapy:    15     mins  75079;  Therapeutic Exercise:    8     mins  00439;     Neuromuscular Karen:    0    mins  00201;    Therapeutic Activity:     0     mins  69690;     Ultrasound:     0     mins  74382;    Electrical Stimulation:    0     mins  39359;    Untimed:  Electrical Stimulation:    0     mins  20599 ( );  Fluidotherapy     0     mins  05401  Hot/cold pack     0     mins  84908    Timed Treatment:   23   mins   Total Treatment:     23   mins        Mo Hall OT  Occupational Therapist  "

## 2021-08-02 ENCOUNTER — OFFICE VISIT (OUTPATIENT)
Dept: ORTHOPEDIC SURGERY | Facility: CLINIC | Age: 81
End: 2021-08-02

## 2021-08-02 VITALS — HEIGHT: 66 IN | BODY MASS INDEX: 28.77 KG/M2 | WEIGHT: 179 LBS | OXYGEN SATURATION: 95 % | HEART RATE: 92 BPM

## 2021-08-02 DIAGNOSIS — Z98.890 S/P ROTATOR CUFF SURGERY: ICD-10-CM

## 2021-08-02 DIAGNOSIS — M79.89 SWELLING OF ARM: ICD-10-CM

## 2021-08-02 DIAGNOSIS — R60.0 LOCALIZED EDEMA: ICD-10-CM

## 2021-08-02 DIAGNOSIS — Z47.89 AFTERCARE FOLLOWING SURGERY OF THE MUSCULOSKELETAL SYSTEM: Primary | ICD-10-CM

## 2021-08-02 PROCEDURE — 99024 POSTOP FOLLOW-UP VISIT: CPT | Performed by: PHYSICIAN ASSISTANT

## 2021-08-02 RX ORDER — HYDROCODONE BITARTRATE AND ACETAMINOPHEN 7.5; 325 MG/1; MG/1
1 TABLET ORAL EVERY 6 HOURS PRN
Qty: 28 TABLET | Refills: 0 | Status: SHIPPED | OUTPATIENT
Start: 2021-08-02 | End: 2023-01-13

## 2021-08-02 NOTE — PROGRESS NOTES
"Chief Complaint  Pain of the Right Shoulder    Subjective          Sabi Haynes presents to [facility] for s/p right shoulder arthroscopy with RCR, AMANDO and PATIENCE on 06-17-21 by Dr. Quintero. Patient states she is still having swelling in her biceps region. She states she has no pain of her shoulder, she states that most of her pain is in the lateral biceps region. She states she had this pain prior to her surgery. She states her upper arm will swell and feel hot with movement. She denies SOB, fever or chills. Patient states she has been making progress in her PT.     Objective   Vital Signs:   Pulse 92   Ht 167.6 cm (66\")   Wt 81.2 kg (179 lb)   SpO2 95%   BMI 28.89 kg/m²       Physical Exam  Constitutional:       Appearance: Normal appearance. He is well-developed and normal weight.   HENT:      Head: Normocephalic.      Right Ear: Hearing and external ear normal.      Left Ear: Hearing and external ear normal.      Nose: Nose normal.   Eyes:      Conjunctiva/sclera: Conjunctivae normal.   Cardiovascular:      Rate and Rhythm: Normal rate.   Pulmonary:      Effort: Pulmonary effort is normal.      Breath sounds: No wheezing or rales.   Abdominal:      Palpations: Abdomen is soft.      Tenderness: There is no abdominal tenderness.   Musculoskeletal:      Cervical back: Normal range of motion.   Skin:     Findings: No rash.   Neurological:      Mental Status: He is alert and oriented to person, place, and time.   Psychiatric:         Mood and Affect: Mood and affect normal.         Judgment: Judgment normal.     Ortho Exam  Right shoulder: Tenderness and swelling mid humerus.  Scars are well-healed.  No discoloration or atrophy noted.  Patient can passively forward flex to 125 degrees.  Passive abduction to 95 degrees.  Good muscle tone in the deltoid, biceps and triceps muscles.  Full active range of motion at the elbow and the wrist.  Patient able to make a fist, good  strength.  " Sensation is intact.  Neurovascular intact.  Radial and ulnar pulse are 2+.        Result Review :   The following data was reviewed by: ARNULFO Ta on 08/02/2021:         Imaging Results (Most Recent)     None                Assessment and Plan    Problem List Items Addressed This Visit        Musculoskeletal and Injuries    Aftercare following right shoulder arthroscopy - Primary    Relevant Orders    Duplex Venous Upper Extremity - Right CAR    Swelling of upper arm      Other Visit Diagnoses     Localized edema         Relevant Orders    Duplex Venous Upper Extremity - Right CAR      Discussed swelling of right upper extremity with patient.  Patient states that she feels that her pain/swelling in the mid humerus region is affecting progress with range of motion and strength in physical therapy.  She states that this pain was present well before her procedure and is unchanged.  I told patient we will proceed with ultrasound of the right upper extremity to determine the cause and she is to follow-up with PCP.    Follow Up   Return in about 6 weeks (around 9/13/2021).  Patient Instructions   Patient given order of US of the RUE due to swelling pre and post-operatively. Recommend following up with PCP.   No heavy lifting, pushing or pulling.   Continue progressing from PROM exercises at physical therapy.  Scar tissue mobilization.    Pain medication as needed. Ice and elevation for associated swelling.   Call with any changes or concerns.     Patient was given instructions and counseling regarding her condition or for health maintenance advice. Please see specific information pulled into the AVS if appropriate.

## 2021-08-02 NOTE — PATIENT INSTRUCTIONS
Patient given order of US of the RUE due to swelling pre and post-operatively. Recommend following up with PCP.   No heavy lifting, pushing or pulling.   Continue progressing from PROM exercises at physical therapy.  Scar tissue mobilization.    Pain medication as needed. Ice and elevation for associated swelling.   Call with any changes or concerns.

## 2021-08-03 ENCOUNTER — TREATMENT (OUTPATIENT)
Dept: PHYSICAL THERAPY | Facility: CLINIC | Age: 81
End: 2021-08-03

## 2021-08-03 ENCOUNTER — APPOINTMENT (OUTPATIENT)
Dept: CARDIOLOGY | Facility: HOSPITAL | Age: 81
End: 2021-08-03

## 2021-08-03 DIAGNOSIS — Z98.890 S/P RIGHT ROTATOR CUFF REPAIR: ICD-10-CM

## 2021-08-03 DIAGNOSIS — Z47.89 AFTERCARE FOLLOWING SURGERY OF THE MUSCULOSKELETAL SYSTEM: Primary | ICD-10-CM

## 2021-08-03 DIAGNOSIS — Z98.890 STATUS POST SUBACROMIAL DECOMPRESSION: ICD-10-CM

## 2021-08-03 PROCEDURE — 97110 THERAPEUTIC EXERCISES: CPT | Performed by: OCCUPATIONAL THERAPIST

## 2021-08-03 PROCEDURE — 97140 MANUAL THERAPY 1/> REGIONS: CPT | Performed by: OCCUPATIONAL THERAPIST

## 2021-08-03 NOTE — PROGRESS NOTES
" Occupational Therapy Daily Progress Note        Patient: Sabi Haynes   : 1940  Diagnosis/ICD-10 Code:  Aftercare following surgery of the musculoskeletal system [Z47.89]  Referring practitioner: Fuad Squires MD  Date of Initial Visit: Type: THERAPY  Noted: 2021  Today's Date: 8/3/2021  Patient seen for 9 sessions             Subjective   Sabi Haynes reports: \"I'm getting an ultrasound for my arm. I think I have a lipoma\"    Objective     See Exercise, Manual, and Modality Logs for complete treatment.       Assessment/Plan    Progress per Plan of Care           Timed:  Manual Therapy:    15     mins  13046;  Therapeutic Exercise:    8     mins  02952;     Neuromuscular Karen:    0    mins  39767;    Therapeutic Activity:     0     mins  63772;     Ultrasound:     0     mins  77550;    Electrical Stimulation:    0     mins  55330;    Untimed:  Electrical Stimulation:    0     mins  94834 (MC );  Fluidotherapy     0     mins  75126  Hot/cold pack     0     mins  76325    Timed Treatment:   23   mins   Total Treatment:     23   mins        Mo Hall OT  Occupational Therapist  "

## 2021-08-05 ENCOUNTER — HOSPITAL ENCOUNTER (OUTPATIENT)
Dept: CARDIOLOGY | Facility: HOSPITAL | Age: 81
Discharge: HOME | End: 2021-08-05
Admitting: PHYSICIAN ASSISTANT

## 2021-08-05 ENCOUNTER — TREATMENT (OUTPATIENT)
Dept: PHYSICAL THERAPY | Facility: CLINIC | Age: 81
End: 2021-08-05

## 2021-08-05 DIAGNOSIS — R60.0 LOCALIZED EDEMA: ICD-10-CM

## 2021-08-05 DIAGNOSIS — Z47.89 AFTERCARE FOLLOWING SURGERY OF THE MUSCULOSKELETAL SYSTEM: Primary | ICD-10-CM

## 2021-08-05 DIAGNOSIS — Z47.89 AFTERCARE FOLLOWING SURGERY OF THE MUSCULOSKELETAL SYSTEM: ICD-10-CM

## 2021-08-05 DIAGNOSIS — Z98.890 STATUS POST SUBACROMIAL DECOMPRESSION: ICD-10-CM

## 2021-08-05 DIAGNOSIS — Z98.890 S/P RIGHT ROTATOR CUFF REPAIR: ICD-10-CM

## 2021-08-05 PROBLEM — E53.8 VITAMIN B12 DEFICIENCY: Status: ACTIVE | Noted: 2021-08-05

## 2021-08-05 PROBLEM — M25.511 RIGHT SHOULDER PAIN: Status: ACTIVE | Noted: 2021-08-05

## 2021-08-05 LAB
BH CV UPPER VENOUS LEFT INTERNAL JUGULAR AUGMENT: NORMAL
BH CV UPPER VENOUS LEFT INTERNAL JUGULAR COMPETENT: NORMAL
BH CV UPPER VENOUS LEFT INTERNAL JUGULAR COMPRESS: NORMAL
BH CV UPPER VENOUS LEFT INTERNAL JUGULAR PHASIC: NORMAL
BH CV UPPER VENOUS LEFT INTERNAL JUGULAR SPONT: NORMAL
BH CV UPPER VENOUS LEFT SUBCLAVIAN AUGMENT: NORMAL
BH CV UPPER VENOUS LEFT SUBCLAVIAN COMPETENT: NORMAL
BH CV UPPER VENOUS LEFT SUBCLAVIAN COMPRESS: NORMAL
BH CV UPPER VENOUS LEFT SUBCLAVIAN PHASIC: NORMAL
BH CV UPPER VENOUS LEFT SUBCLAVIAN SPONT: NORMAL
BH CV UPPER VENOUS RIGHT AXILLARY AUGMENT: NORMAL
BH CV UPPER VENOUS RIGHT AXILLARY COMPETENT: NORMAL
BH CV UPPER VENOUS RIGHT AXILLARY COMPRESS: NORMAL
BH CV UPPER VENOUS RIGHT AXILLARY PHASIC: NORMAL
BH CV UPPER VENOUS RIGHT AXILLARY SPONT: NORMAL
BH CV UPPER VENOUS RIGHT BASILIC FOREARM COMPRESS: NORMAL
BH CV UPPER VENOUS RIGHT BASILIC UPPER COMPRESS: NORMAL
BH CV UPPER VENOUS RIGHT BRACHIAL COMPRESS: NORMAL
BH CV UPPER VENOUS RIGHT CEPHALIC FOREARM COMPRESS: NORMAL
BH CV UPPER VENOUS RIGHT CEPHALIC UPPER COMPRESS: NORMAL
BH CV UPPER VENOUS RIGHT INTERNAL JUGULAR AUGMENT: NORMAL
BH CV UPPER VENOUS RIGHT INTERNAL JUGULAR COMPETENT: NORMAL
BH CV UPPER VENOUS RIGHT INTERNAL JUGULAR COMPRESS: NORMAL
BH CV UPPER VENOUS RIGHT INTERNAL JUGULAR PHASIC: NORMAL
BH CV UPPER VENOUS RIGHT INTERNAL JUGULAR SPONT: NORMAL
BH CV UPPER VENOUS RIGHT RADIAL COMPRESS: NORMAL
BH CV UPPER VENOUS RIGHT SUBCLAVIAN AUGMENT: NORMAL
BH CV UPPER VENOUS RIGHT SUBCLAVIAN COMPETENT: NORMAL
BH CV UPPER VENOUS RIGHT SUBCLAVIAN COMPRESS: NORMAL
BH CV UPPER VENOUS RIGHT SUBCLAVIAN PHASIC: NORMAL
BH CV UPPER VENOUS RIGHT SUBCLAVIAN SPONT: NORMAL
BH CV UPPER VENOUS RIGHT ULNAR COMPRESS: NORMAL
MAXIMAL PREDICTED HEART RATE: 140 BPM
STRESS TARGET HR: 119 BPM

## 2021-08-05 PROCEDURE — 93971 EXTREMITY STUDY: CPT

## 2021-08-05 PROCEDURE — 97140 MANUAL THERAPY 1/> REGIONS: CPT | Performed by: OCCUPATIONAL THERAPIST

## 2021-08-05 PROCEDURE — 93971 EXTREMITY STUDY: CPT | Performed by: SURGERY

## 2021-08-05 PROCEDURE — 97110 THERAPEUTIC EXERCISES: CPT | Performed by: OCCUPATIONAL THERAPIST

## 2021-08-05 RX ORDER — CYANOCOBALAMIN 1000 UG/ML
1000 INJECTION, SOLUTION INTRAMUSCULAR; SUBCUTANEOUS
Status: DISCONTINUED | OUTPATIENT
Start: 2021-08-05 | End: 2021-08-05

## 2021-08-05 NOTE — PROGRESS NOTES
"Re-Assessment / Re-Certification        Patient: Sabi Haynes   : 1940  Diagnosis/ICD-10 Code:  Aftercare following surgery of the musculoskeletal system [Z47.89]  Referring practitioner: Fuad Squires MD  Date of Initial Visit: Type: THERAPY  Noted: 2021  Today's Date: 2021  Patient seen for 10 sessions      Subjective:   Subjective Questionnaire: QuickDASH: 33/55 (40-59%)  Clinical Progress: improved  Home Program Compliance: Yes  Treatment has included: therapeutic exercise, manual therapy and cryotherapy    Subjective Evaluation    Pain  Current pain ratin  Location: right lower arm above elbow       \"It's really bothering me today. I reached over for the remote last night and I think that's what did it. And today they had my arm in awkward positions while they were taking a look at it\"  Objective          Neurological Testing     Additional Neurological Details  WNL B shoulders.    Active Range of Motion     Left Elbow   Normal active range of motion    Right Elbow   Normal active range of motion    Left Wrist   Normal active range of motion    Right Wrist   Normal active range of motion    Additional Active Range of Motion Details  Pt able to make full, composite fist.    Passive Range of Motion     Right Shoulder   Flexion: 125 degrees   Abduction: 95 degrees   External rotation 0°: 50 degrees       Assessment & Plan     Assessment  Impairments: abnormal or restricted ROM, activity intolerance, impaired physical strength, lacks appropriate home exercise program and pain with function  Prognosis: fair  Functional Limitations: carrying objects, lifting, sleeping, pulling, pushing, moving in bed, reaching behind back and reaching overhead  Goals  Plan Goals: SHOULDER  PROBLEMS:     1. The patient has limited ROM of the right shoulder.    LTG 1: 12 weeks:  The patient will demonstrate 140 degrees of right shoulder flexion, 140 degrees of shoulder abduction, 60 degrees of shoulder " external rotation, and 60 degrees of shoulder internal rotation to allow the patient to reach into upper kitchen cabinets and manipulate clothing behind the back with greater ease.    STATUS:  New   STG 1a: 8 weeks:  The patient will demonstrate 140 degrees of passive right shoulder flexion, 140 degrees of passive right shoulder abduction, 60 degrees of passive right shoulder external rotation, and 60 degrees of passive right shoulder internal rotation.    STATUS:  Not met: progressing   TREATMENT: Manual therapy, therapeutic exercise, home exercise instruction, and modalities as needed to include: electrical stimulation, ultrasound, moist heat, and ice.    2. The patient has limited strength of the right shoulder.   LTG 2: 12 weeks:  The patient will demonstrate 4 /5 strength for right shoulder flexion, abduction, external rotation, and internal rotation in order to demonstrate improved shoulder stability.    STATUS:  New   STG 2a: 8 weeks:  The patient will demonstrate 3 /5 strength for right shoulder flexion, abduction, external rotation, and internal rotation.    STATUS:  Not met: progressing   TREATMENT: Manual therapy, therapeutic exercise, home exercise instruction, and modalities as needed to include: electrical stimulation, ultrasound, moist heat, and ice.     3. Carrying, Moving, and Handling Objects Functional Limitation     LTG 3: 12 weeks:  The patient will demonstrate 1-19 % limitation by achieving a score of 19 on the QuickDASH.    STATUS:  New   STG 3a: 8 weeks:  The patient will demonstrate 20-39 % limitation by achieving a score of 27 on the QuickDASH.      STATUS:  Not met: progressing   TREATMENT:  Manual therapy, therapeutic exercise, home exercise instruction, and modalities as needed to include: moist heat, electrical stimulation, and ultrasound.      Progress toward previous goals: Not Met      Recommendations: Continue as planned  Timeframe: 2 months  Prognosis to achieve goals: good    OT  SIGNATURE: Mo Hall, RODRÍGUEZ   DATE TREATMENT INITIATED: 8/5/2021  Certification Period: 8/5/2021 - 11/3/2021        Based upon review of the patient's progress and continued therapy plan, it is my medical opinion that Sabi Haynes should continue physical therapy treatment at [facility] PHYS[facility] THERAPY  [address] [place] [address] [phone].    Signature: __________________________________  Fuad Squires MD    Timed:  Manual Therapy:    15     mins  53375;  Therapeutic Exercise:    8     mins  00725;     Neuromuscular Karen:    0    mins  02228;    Therapeutic Activity:     0     mins  25929;     Ultrasound:     0     mins  72513;    Electrical Stimulation:    0     mins  16211 ( );    Untimed:  Electrical Stimulation:    0     mins  68604 ( );  Fluidotherapy     0     mins  85760  Hot/cold pack     0     mins  07968    Timed Treatment:   23   mins   Total Treatment:     23   mins  Please sign and return via fax to [phone] . Thank you, [facility] Occupational Therapy.

## 2021-08-09 ENCOUNTER — TREATMENT (OUTPATIENT)
Dept: PHYSICAL THERAPY | Facility: CLINIC | Age: 81
End: 2021-08-09

## 2021-08-09 DIAGNOSIS — Z98.890 STATUS POST SUBACROMIAL DECOMPRESSION: ICD-10-CM

## 2021-08-09 DIAGNOSIS — Z98.890 S/P RIGHT ROTATOR CUFF REPAIR: ICD-10-CM

## 2021-08-09 DIAGNOSIS — Z47.89 AFTERCARE FOLLOWING SURGERY OF THE MUSCULOSKELETAL SYSTEM: Primary | ICD-10-CM

## 2021-08-09 PROCEDURE — 97110 THERAPEUTIC EXERCISES: CPT | Performed by: OCCUPATIONAL THERAPIST

## 2021-08-09 PROCEDURE — 97140 MANUAL THERAPY 1/> REGIONS: CPT | Performed by: OCCUPATIONAL THERAPIST

## 2021-08-09 NOTE — PROGRESS NOTES
" Occupational Therapy Daily Progress Note        Patient: Sabi Haynes   : 1940  Diagnosis/ICD-10 Code:  Aftercare following surgery of the musculoskeletal system [Z47.89]  Referring practitioner: Fuad Squires MD  Date of Initial Visit: Type: THERAPY  Noted: 2021  Today's Date: 2021  Patient seen for 11 sessions             Subjective Evaluation    Pain  Current pain ratin  Location: right arm         Sabi Haynes reports: \"My shoulder feels fine. My arm hurts but not my shoulder.\"    Objective     See Exercise, Manual, and Modality Logs for complete treatment.       Assessment/Plan    Progress per Plan of Care           Timed:  Manual Therapy:    15     mins  83247;  Therapeutic Exercise:    8     mins  00777;     Neuromuscular Karen:    0    mins  53029;    Therapeutic Activity:     0     mins  69050;     Ultrasound:     0     mins  31362;    Electrical Stimulation:    0     mins  36194;    Untimed:  Electrical Stimulation:    0     mins  31309 (MC );  Fluidotherapy     0     mins  26309  Hot/cold pack     0     mins  79737    Timed Treatment:   23   mins   Total Treatment:     23   mins        Mo Hall OT  Occupational Therapist  "

## 2021-08-13 ENCOUNTER — TREATMENT (OUTPATIENT)
Dept: PHYSICAL THERAPY | Facility: CLINIC | Age: 81
End: 2021-08-13

## 2021-08-13 DIAGNOSIS — Z47.89 AFTERCARE FOLLOWING SURGERY OF THE MUSCULOSKELETAL SYSTEM: Primary | ICD-10-CM

## 2021-08-13 DIAGNOSIS — Z98.890 STATUS POST SUBACROMIAL DECOMPRESSION: ICD-10-CM

## 2021-08-13 DIAGNOSIS — Z98.890 S/P RIGHT ROTATOR CUFF REPAIR: ICD-10-CM

## 2021-08-13 PROCEDURE — 97110 THERAPEUTIC EXERCISES: CPT | Performed by: OCCUPATIONAL THERAPIST

## 2021-08-13 PROCEDURE — 97140 MANUAL THERAPY 1/> REGIONS: CPT | Performed by: OCCUPATIONAL THERAPIST

## 2021-08-13 NOTE — PROGRESS NOTES
" Occupational Therapy Daily Progress Note        Patient: Sabi Haynes   : 1940  Diagnosis/ICD-10 Code:  Aftercare following surgery of the musculoskeletal system [Z47.89]  Referring practitioner: Fuad Squires MD  Date of Initial Visit: Type: THERAPY  Noted: 2021  Today's Date: 2021  Patient seen for 12 sessions             Subjective Evaluation    Pain  Current pain ratin  Location: mid humeral region of right arm         Sabi Haynes reports: \"It doesn't really hurt it's just sore\"    Objective     See Exercise, Manual, and Modality Logs for complete treatment.       Assessment/Plan    Progress per Plan of Care           Timed:  Manual Therapy:    13     mins  11005;  Therapeutic Exercise:    10     mins  07039;     Neuromuscular Karen:    0    mins  84812;    Therapeutic Activity:     0     mins  31288;     Ultrasound:     0     mins  24179;    Electrical Stimulation:    0     mins  13248;    Untimed:  Electrical Stimulation:    0     mins  55122 (MC );  Fluidotherapy     0     mins  38954  Hot/cold pack     0     mins  36830    Timed Treatment:   23   mins   Total Treatment:     23   mins        Mo Hall OT  Occupational Therapist  "

## 2021-08-16 ENCOUNTER — TREATMENT (OUTPATIENT)
Dept: PHYSICAL THERAPY | Facility: CLINIC | Age: 81
End: 2021-08-16

## 2021-08-16 DIAGNOSIS — Z98.890 S/P RIGHT ROTATOR CUFF REPAIR: ICD-10-CM

## 2021-08-16 DIAGNOSIS — Z98.890 STATUS POST SUBACROMIAL DECOMPRESSION: ICD-10-CM

## 2021-08-16 DIAGNOSIS — Z47.89 AFTERCARE FOLLOWING SURGERY OF THE MUSCULOSKELETAL SYSTEM: Primary | ICD-10-CM

## 2021-08-16 PROCEDURE — 97110 THERAPEUTIC EXERCISES: CPT | Performed by: OCCUPATIONAL THERAPIST

## 2021-08-16 PROCEDURE — 97140 MANUAL THERAPY 1/> REGIONS: CPT | Performed by: OCCUPATIONAL THERAPIST

## 2021-08-16 NOTE — PROGRESS NOTES
" Occupational Therapy Daily Progress Note        Patient: Sabi Haynes   : 1940  Diagnosis/ICD-10 Code:  Aftercare following surgery of the musculoskeletal system [Z47.89]  Referring practitioner: Fuad Squires MD  Date of Initial Visit: Type: THERAPY  Noted: 2021  Today's Date: 2021  Patient seen for 13 sessions             Subjective Evaluation    Pain  Current pain ratin  Location: right shoulder         Sabi Haynes reports: \"It was really sore over the weekend. The bar exercise hurts\"    Objective     See Exercise, Manual, and Modality Logs for complete treatment.       Assessment/Plan  OT encouraged pt to stop completing AAROM supine with Tbar and to put a hold on HEP until she returns to therapy next session. OT also encouraged pt to continue with cold pack.  Progress per Plan of Care           Timed:  Manual Therapy:    13     mins  47049;  Therapeutic Exercise:    10     mins  05629;     Neuromuscular Karen:    0    mins  57794;    Therapeutic Activity:     0     mins  44991;     Ultrasound:     0     mins  31286;    Electrical Stimulation:    0     mins  47078;    Untimed:  Electrical Stimulation:    0     mins  44322 ( );  Fluidotherapy     0     mins  60190  Hot/cold pack     0     mins  56265    Timed Treatment:   23   mins   Total Treatment:     23   mins        Mo Hall OT  Occupational Therapist  "

## 2021-08-20 ENCOUNTER — TREATMENT (OUTPATIENT)
Dept: PHYSICAL THERAPY | Facility: CLINIC | Age: 81
End: 2021-08-20

## 2021-08-20 ENCOUNTER — OFFICE VISIT (OUTPATIENT)
Dept: FAMILY MEDICINE CLINIC | Facility: CLINIC | Age: 81
End: 2021-08-20

## 2021-08-20 VITALS
TEMPERATURE: 97.5 F | RESPIRATION RATE: 16 BRPM | HEIGHT: 66 IN | HEART RATE: 99 BPM | OXYGEN SATURATION: 97 % | WEIGHT: 184 LBS | SYSTOLIC BLOOD PRESSURE: 142 MMHG | DIASTOLIC BLOOD PRESSURE: 78 MMHG | BODY MASS INDEX: 29.57 KG/M2

## 2021-08-20 DIAGNOSIS — Z47.89 AFTERCARE FOLLOWING SURGERY OF THE MUSCULOSKELETAL SYSTEM: Primary | ICD-10-CM

## 2021-08-20 DIAGNOSIS — R31.9 URINARY TRACT INFECTION WITH HEMATURIA, SITE UNSPECIFIED: ICD-10-CM

## 2021-08-20 DIAGNOSIS — E11.65 TYPE 2 DIABETES MELLITUS WITH HYPERGLYCEMIA, WITH LONG-TERM CURRENT USE OF INSULIN (HCC): Primary | ICD-10-CM

## 2021-08-20 DIAGNOSIS — R30.0 DYSURIA: ICD-10-CM

## 2021-08-20 DIAGNOSIS — Z98.890 S/P RIGHT ROTATOR CUFF REPAIR: ICD-10-CM

## 2021-08-20 DIAGNOSIS — Z98.890 STATUS POST SUBACROMIAL DECOMPRESSION: ICD-10-CM

## 2021-08-20 DIAGNOSIS — Z79.4 TYPE 2 DIABETES MELLITUS WITH HYPERGLYCEMIA, WITH LONG-TERM CURRENT USE OF INSULIN (HCC): Primary | ICD-10-CM

## 2021-08-20 DIAGNOSIS — B35.1 ONYCHOMYCOSIS: ICD-10-CM

## 2021-08-20 DIAGNOSIS — N39.0 URINARY TRACT INFECTION WITH HEMATURIA, SITE UNSPECIFIED: ICD-10-CM

## 2021-08-20 PROBLEM — M19.90 ARTHRITIS: Status: ACTIVE | Noted: 2021-08-20

## 2021-08-20 LAB
BILIRUB BLD-MCNC: NEGATIVE MG/DL
CLARITY, POC: ABNORMAL
COLOR UR: YELLOW
GLUCOSE UR STRIP-MCNC: NEGATIVE MG/DL
KETONES UR QL: NEGATIVE
LEUKOCYTE EST, POC: ABNORMAL
NITRITE UR-MCNC: POSITIVE MG/ML
PH UR: 5.5 [PH] (ref 5–8)
PROT UR STRIP-MCNC: NEGATIVE MG/DL
RBC # UR STRIP: ABNORMAL /UL
SP GR UR: 1.02 (ref 1–1.03)
UROBILINOGEN UR QL: NORMAL

## 2021-08-20 PROCEDURE — 97110 THERAPEUTIC EXERCISES: CPT | Performed by: OCCUPATIONAL THERAPIST

## 2021-08-20 PROCEDURE — 87186 SC STD MICRODIL/AGAR DIL: CPT | Performed by: FAMILY MEDICINE

## 2021-08-20 PROCEDURE — 99214 OFFICE O/P EST MOD 30 MIN: CPT | Performed by: FAMILY MEDICINE

## 2021-08-20 PROCEDURE — 87077 CULTURE AEROBIC IDENTIFY: CPT | Performed by: FAMILY MEDICINE

## 2021-08-20 PROCEDURE — 83036 HEMOGLOBIN GLYCOSYLATED A1C: CPT | Performed by: FAMILY MEDICINE

## 2021-08-20 PROCEDURE — 36415 COLL VENOUS BLD VENIPUNCTURE: CPT | Performed by: FAMILY MEDICINE

## 2021-08-20 PROCEDURE — 97140 MANUAL THERAPY 1/> REGIONS: CPT | Performed by: OCCUPATIONAL THERAPIST

## 2021-08-20 PROCEDURE — 87086 URINE CULTURE/COLONY COUNT: CPT | Performed by: FAMILY MEDICINE

## 2021-08-20 PROCEDURE — 81003 URINALYSIS AUTO W/O SCOPE: CPT | Performed by: FAMILY MEDICINE

## 2021-08-20 RX ORDER — NITROFURANTOIN 25; 75 MG/1; MG/1
100 CAPSULE ORAL 2 TIMES DAILY
Qty: 14 CAPSULE | Refills: 0 | Status: SHIPPED | OUTPATIENT
Start: 2021-08-20 | End: 2021-08-27

## 2021-08-20 RX ORDER — INSULIN GLARGINE 100 [IU]/ML
50 INJECTION, SOLUTION SUBCUTANEOUS EVERY EVENING
Qty: 15 PEN | Refills: 3 | Status: SHIPPED | OUTPATIENT
Start: 2021-08-20 | End: 2021-11-16 | Stop reason: SDUPTHER

## 2021-08-20 RX ORDER — CICLOPIROX OLAMINE 7.7 MG/100ML
SUSPENSION TOPICAL EVERY 12 HOURS SCHEDULED
Qty: 60 ML | Refills: 1 | Status: SHIPPED | OUTPATIENT
Start: 2021-08-20 | End: 2021-11-16

## 2021-08-20 NOTE — PROGRESS NOTES
" Occupational Therapy Daily Progress Note        Patient: Sabi Haynes   : 1940  Diagnosis/ICD-10 Code:  Aftercare following surgery of the musculoskeletal system [Z47.89]  Referring practitioner: Fuad Squires MD  Date of Initial Visit: Type: THERAPY  Noted: 2021  Today's Date: 2021  Patient seen for 14 sessions             Subjective Evaluation    Pain  Current pain ratin (at rest)         Sabi Haynes reports: \"I don't have any pain until I move it then I feel it in my shoulder blade (mid to inferior aspect) and in my arm (mid humeral region).    Objective          Neurological Testing     Additional Neurological Details  WNL B shoulders.    Active Range of Motion     Left Elbow   Normal active range of motion    Right Elbow   Normal active range of motion    Left Wrist   Normal active range of motion    Right Wrist   Normal active range of motion    Additional Active Range of Motion Details  Pt able to make full, composite fist.    Passive Range of Motion     Right Shoulder   Flexion: 115 degrees   Abduction: 100 degrees   External rotation 0°: 50 degrees         See Exercise, Manual, and Modality Logs for complete treatment.       Assessment/Plan  OT again informed pt to hold off on HEP and let arm continue to rest with cold pack as needed. Pain has improved since last visit.  Progress per Plan of Care           Timed:  Manual Therapy:    15     mins  02705;  Therapeutic Exercise:    8     mins  07657;     Neuromuscular Karen:    0    mins  58833;    Therapeutic Activity:     0     mins  20790;     Ultrasound:     0     mins  74977;    Electrical Stimulation:    0     mins  73718;    Untimed:  Electrical Stimulation:    0     mins  70684 (MC );  Fluidotherapy     0     mins  77906  Hot/cold pack     0     mins  20008    Timed Treatment:   23   mins   Total Treatment:     23   mins        Mo Hall OT  Occupational Therapist  "

## 2021-08-21 LAB — HBA1C MFR BLD: 8.18 % (ref 4.8–5.6)

## 2021-08-22 LAB — BACTERIA SPEC AEROBE CULT: ABNORMAL

## 2021-08-23 ENCOUNTER — TREATMENT (OUTPATIENT)
Dept: PHYSICAL THERAPY | Facility: CLINIC | Age: 81
End: 2021-08-23

## 2021-08-23 DIAGNOSIS — Z98.890 S/P RIGHT ROTATOR CUFF REPAIR: ICD-10-CM

## 2021-08-23 DIAGNOSIS — Z47.89 AFTERCARE FOLLOWING SURGERY OF THE MUSCULOSKELETAL SYSTEM: Primary | ICD-10-CM

## 2021-08-23 DIAGNOSIS — Z98.890 STATUS POST SUBACROMIAL DECOMPRESSION: ICD-10-CM

## 2021-08-23 PROCEDURE — 97110 THERAPEUTIC EXERCISES: CPT | Performed by: OCCUPATIONAL THERAPIST

## 2021-08-23 PROCEDURE — 97140 MANUAL THERAPY 1/> REGIONS: CPT | Performed by: OCCUPATIONAL THERAPIST

## 2021-08-23 NOTE — PROGRESS NOTES
" Occupational Therapy Daily Progress Note        Patient: Sabi Haynes   : 1940  Diagnosis/ICD-10 Code:  Aftercare following surgery of the musculoskeletal system [Z47.89]  Referring practitioner: Fuad Squires MD  Date of Initial Visit: Type: THERAPY  Noted: 2021  Today's Date: 2021  Patient seen for 15 sessions             Subjective   Sabi Haynes reports: \"It's better than it was last week. I'm still having pain sometimes in the middle of my arm and the lower part of my shoulder blade    Objective     See Exercise, Manual, and Modality Logs for complete treatment.       Assessment/Plan  OT instructed pt to continue HEP.  Progress per Plan of Care           Timed:  Manual Therapy:    15     mins  03448;  Therapeutic Exercise:    8     mins  06507;     Neuromuscular Karen:    0    mins  98809;    Therapeutic Activity:     0     mins  75251;     Ultrasound:     0     mins  94181;    Electrical Stimulation:    0     mins  39044;    Untimed:  Electrical Stimulation:    0     mins  34395 (MC );  Fluidotherapy     0     mins  10907  Hot/cold pack     0     mins  79245    Timed Treatment:   23   mins   Total Treatment:     23   mins        Mo Hall OT  Occupational Therapist  "

## 2021-08-24 ENCOUNTER — TELEPHONE (OUTPATIENT)
Dept: FAMILY MEDICINE CLINIC | Facility: CLINIC | Age: 81
End: 2021-08-24

## 2021-08-24 NOTE — TELEPHONE ENCOUNTER
Caller: Sabi Haynes    Relationship: Self    Best call back number: [phone] (H)    Caller requesting test results: YES    What test was performed: LAB WORK     When was the test performed: 08/20/21    Where was the test performed: IN OFFICE

## 2021-08-27 ENCOUNTER — TREATMENT (OUTPATIENT)
Dept: PHYSICAL THERAPY | Facility: CLINIC | Age: 81
End: 2021-08-27

## 2021-08-27 DIAGNOSIS — Z47.89 AFTERCARE FOLLOWING SURGERY OF THE MUSCULOSKELETAL SYSTEM: Primary | ICD-10-CM

## 2021-08-27 DIAGNOSIS — Z98.890 S/P RIGHT ROTATOR CUFF REPAIR: ICD-10-CM

## 2021-08-27 DIAGNOSIS — Z98.890 STATUS POST SUBACROMIAL DECOMPRESSION: ICD-10-CM

## 2021-08-27 PROCEDURE — 97140 MANUAL THERAPY 1/> REGIONS: CPT | Performed by: OCCUPATIONAL THERAPIST

## 2021-08-27 PROCEDURE — 97110 THERAPEUTIC EXERCISES: CPT | Performed by: OCCUPATIONAL THERAPIST

## 2021-08-27 NOTE — PROGRESS NOTES
" Occupational Therapy Daily Progress Note        Patient: Sabi Haynes   : 1940  Diagnosis/ICD-10 Code:  Aftercare following surgery of the musculoskeletal system [Z47.89]  Referring practitioner: Fuad Squires MD  Date of Initial Visit: Type: THERAPY  Noted: 2021  Today's Date: 2021  Patient seen for 16 sessions             Subjective   Sabi Haynes reports: \"I'm not having any pain today and I didn't have any yesterday\"    Objective     See Exercise, Manual, and Modality Logs for complete treatment.       Assessment/Plan  Pt tolerated isometric strengthening well this date.  Progress per Plan of Care           Timed:  Manual Therapy:    10     mins  00805;  Therapeutic Exercise:    15     mins  68771;     Neuromuscular Karen:    0    mins  85958;    Therapeutic Activity:     0     mins  90036;     Ultrasound:     0     mins  28923;    Electrical Stimulation:    0     mins  86777;    Untimed:  Electrical Stimulation:    0     mins  12254 (MC );  Fluidotherapy     0     mins  40721  Hot/cold pack     0     mins  84153    Timed Treatment:   25   mins   Total Treatment:     25   mins        Mo Hall OT  Occupational Therapist  "

## 2021-08-30 ENCOUNTER — TREATMENT (OUTPATIENT)
Dept: PHYSICAL THERAPY | Facility: CLINIC | Age: 81
End: 2021-08-30

## 2021-08-30 DIAGNOSIS — N39.0 URINARY TRACT INFECTION WITH HEMATURIA, SITE UNSPECIFIED: Primary | ICD-10-CM

## 2021-08-30 DIAGNOSIS — Z98.890 STATUS POST SUBACROMIAL DECOMPRESSION: ICD-10-CM

## 2021-08-30 DIAGNOSIS — R31.9 URINARY TRACT INFECTION WITH HEMATURIA, SITE UNSPECIFIED: Primary | ICD-10-CM

## 2021-08-30 DIAGNOSIS — Z98.890 S/P RIGHT ROTATOR CUFF REPAIR: ICD-10-CM

## 2021-08-30 DIAGNOSIS — Z47.89 AFTERCARE FOLLOWING SURGERY OF THE MUSCULOSKELETAL SYSTEM: Primary | ICD-10-CM

## 2021-08-30 PROCEDURE — 97110 THERAPEUTIC EXERCISES: CPT | Performed by: OCCUPATIONAL THERAPIST

## 2021-08-30 PROCEDURE — 97140 MANUAL THERAPY 1/> REGIONS: CPT | Performed by: OCCUPATIONAL THERAPIST

## 2021-08-30 RX ORDER — CIPROFLOXACIN 500 MG/1
500 TABLET, FILM COATED ORAL 2 TIMES DAILY
Qty: 14 TABLET | Refills: 0 | Status: SHIPPED | OUTPATIENT
Start: 2021-08-30 | End: 2021-09-06

## 2021-08-30 NOTE — PROGRESS NOTES
" Occupational Therapy Daily Progress Note        Patient: Sabi Haynes   : 1940  Diagnosis/ICD-10 Code:  Aftercare following surgery of the musculoskeletal system [Z47.89]  Referring practitioner: Fuad Squires MD  Date of Initial Visit: Type: THERAPY  Noted: 2021  Today's Date: 2021  Patient seen for 17 sessions             Subjective Evaluation    Pain  Current pain ratin  Location: Right middle of upper arm         Sabi Haynes reports: \"Last night it was really bothering me but not as much today\"    Objective     See Exercise, Manual, and Modality Logs for complete treatment.       Assessment/Plan  OT reviewed isometric HEP this date.  Progress per Plan of Care           Timed:  Manual Therapy:    12     mins  46984;  Therapeutic Exercise:    11     mins  11735;     Neuromuscular Karen:    0    mins  69440;    Therapeutic Activity:     0     mins  59455;     Ultrasound:     0     mins  95817;    Electrical Stimulation:    0     mins  39888;    Untimed:  Electrical Stimulation:    0     mins  20821 (MC );  Fluidotherapy     0     mins  35051  Hot/cold pack     0     mins  35803    Timed Treatment:   23   mins   Total Treatment:     23   mins        Mo Hall OT  Occupational Therapist  "

## 2021-09-01 ENCOUNTER — TRANSCRIBE ORDERS (OUTPATIENT)
Dept: ADMINISTRATIVE | Facility: HOSPITAL | Age: 81
End: 2021-09-01

## 2021-09-01 DIAGNOSIS — N31.9 NEUROGENIC BLADDER: Primary | ICD-10-CM

## 2021-09-03 ENCOUNTER — TREATMENT (OUTPATIENT)
Dept: PHYSICAL THERAPY | Facility: CLINIC | Age: 81
End: 2021-09-03

## 2021-09-03 DIAGNOSIS — Z98.890 STATUS POST SUBACROMIAL DECOMPRESSION: ICD-10-CM

## 2021-09-03 DIAGNOSIS — Z98.890 S/P RIGHT ROTATOR CUFF REPAIR: ICD-10-CM

## 2021-09-03 DIAGNOSIS — Z47.89 AFTERCARE FOLLOWING SURGERY OF THE MUSCULOSKELETAL SYSTEM: Primary | ICD-10-CM

## 2021-09-03 PROCEDURE — 97140 MANUAL THERAPY 1/> REGIONS: CPT | Performed by: OCCUPATIONAL THERAPIST

## 2021-09-03 PROCEDURE — 97110 THERAPEUTIC EXERCISES: CPT | Performed by: OCCUPATIONAL THERAPIST

## 2021-09-03 NOTE — PROGRESS NOTES
"Patient: Sabi Haynes   : 1940  Diagnosis/ICD-10 Code:  Aftercare following surgery of the musculoskeletal system [Z47.89]  Referring practitioner: Fuad Squires MD  Date of Initial Visit: Type: THERAPY  Noted: 2021  Today's Date: 9/3/2021  Patient seen for 18 sessions      Subjective:   Subjective Questionnaire: QuickDASH: 18/55 (1-19%)  Clinical Progress: improved  Home Program Compliance: Yes  Treatment has included: therapeutic exercise, manual therapy, moist heat and cryotherapy    Subjective \"It's doing so much better today\"  Objective          Neurological Testing     Additional Neurological Details  WNL B shoulders.    Active Range of Motion     Right Shoulder   Flexion: 55 degrees     Left Elbow   Normal active range of motion    Right Elbow   Normal active range of motion    Left Wrist   Normal active range of motion    Right Wrist   Normal active range of motion    Additional Active Range of Motion Details  Pt able to make full, composite fist.    Passive Range of Motion     Right Shoulder   Flexion: 120 degrees   Abduction: 110 degrees   External rotation 90°: 45 degrees   Internal rotation 90°: 50 degrees       Assessment & Plan     Assessment  Impairments: abnormal or restricted ROM, activity intolerance, impaired physical strength, lacks appropriate home exercise program and pain with function  Prognosis: fair  Functional Limitations: carrying objects, lifting, sleeping, pulling, pushing, moving in bed, reaching behind back and reaching overhead  Goals  Plan Goals: SHOULDER  PROBLEMS:     1. The patient has limited ROM of the right shoulder.    LTG 1: 12 weeks:  The patient will demonstrate 140 degrees of right shoulder flexion, 140 degrees of shoulder abduction, 60 degrees of shoulder external rotation, and 60 degrees of shoulder internal rotation to allow the patient to reach into upper kitchen cabinets and manipulate clothing behind the back with greater ease.    STATUS:  " New   STG 1a: 8 weeks:  The patient will demonstrate 140 degrees of passive right shoulder flexion, 140 degrees of passive right shoulder abduction, 60 degrees of passive right shoulder external rotation, and 60 degrees of passive right shoulder internal rotation.    STATUS:  Not met: progressing   TREATMENT: Manual therapy, therapeutic exercise, home exercise instruction, and modalities as needed to include: electrical stimulation, ultrasound, moist heat, and ice.    2. The patient has limited strength of the right shoulder.   LTG 2: 12 weeks:  The patient will demonstrate 4 /5 strength for right shoulder flexion, abduction, external rotation, and internal rotation in order to demonstrate improved shoulder stability.    STATUS:  New   STG 2a: 8 weeks:  The patient will demonstrate 3 /5 strength for right shoulder flexion, abduction, external rotation, and internal rotation.    STATUS:  Not met: progressing   TREATMENT: Manual therapy, therapeutic exercise, home exercise instruction, and modalities as needed to include: electrical stimulation, ultrasound, moist heat, and ice.     3. Carrying, Moving, and Handling Objects Functional Limitation     LTG 3: 12 weeks:  The patient will demonstrate 1-19 % limitation by achieving a score of 19 on the QuickDASH.    STATUS:  New   STG 3a: 8 weeks:  The patient will demonstrate 20-39 % limitation by achieving a score of 27 on the QuickDASH.      STATUS:  Not met: progressing   TREATMENT:  Manual therapy, therapeutic exercise, home exercise instruction, and modalities as needed to include: moist heat, electrical stimulation, and ultrasound.      Progress toward previous goals: Partially Met      Recommendations: Continue as planned  Timeframe: 2 months  Prognosis to achieve goals: good    OT SIGNATURE: Mo Hall OT   DATE TREATMENT INITIATED: 9/3/2021  Certification Period: 9/3/2021 - 12/2/2021        Based upon review of the patient's progress and continued therapy  plan, it is my medical opinion that Sabi Haynes should continue physical therapy treatment at [facility] PH[facility] THERAP[facility]  [address] [place] [address] [place] [address] [phone].    Signature: __________________________________  Fuad Squires MD    Timed:  Manual Therapy:    12     mins  73218;  Therapeutic Exercise:    11     mins  81510;     Neuromuscular Karen:    0    mins  32428;    Therapeutic Activity:     0     mins  56109;     Ultrasound:     0     mins  56174;    Electrical Stimulation:    0     mins  44256 ( );    Untimed:  Electrical Stimulation:    0     mins  43758 ( );  Fluidotherapy     0     mins  72295  Hot/cold pack     0     mins  11018    Timed Treatment:   23   mins   Total Treatment:     23   mins  Please sign and return via fax to [phone]  . Thank you, [facility] Occupational Therapy.

## 2021-09-10 ENCOUNTER — TREATMENT (OUTPATIENT)
Dept: PHYSICAL THERAPY | Facility: CLINIC | Age: 81
End: 2021-09-10

## 2021-09-10 DIAGNOSIS — Z98.890 S/P RIGHT ROTATOR CUFF REPAIR: ICD-10-CM

## 2021-09-10 DIAGNOSIS — Z47.89 AFTERCARE FOLLOWING SURGERY OF THE MUSCULOSKELETAL SYSTEM: Primary | ICD-10-CM

## 2021-09-10 DIAGNOSIS — Z98.890 STATUS POST SUBACROMIAL DECOMPRESSION: ICD-10-CM

## 2021-09-10 PROCEDURE — 97110 THERAPEUTIC EXERCISES: CPT | Performed by: OCCUPATIONAL THERAPIST

## 2021-09-10 PROCEDURE — 97140 MANUAL THERAPY 1/> REGIONS: CPT | Performed by: OCCUPATIONAL THERAPIST

## 2021-09-10 NOTE — PROGRESS NOTES
" Occupational Therapy Daily Progress Note        Patient: Sabi Haynes   : 1940  Diagnosis/ICD-10 Code:  Aftercare following surgery of the musculoskeletal system [Z47.89]  Referring practitioner: Fuad Squires MD  Date of Initial Visit: Type: THERAPY  Noted: 2021  Today's Date: 9/10/2021  Patient seen for 19 sessions             Subjective Evaluation    Pain  Current pain ratin  Location: right arm mid humerus         Sabi Haynes reports: \"My arm has been really hurting again\"    Objective     See Exercise, Manual, and Modality Logs for complete treatment.       Assessment/Plan    Progress per Plan of Care           Timed:  Manual Therapy:    13     mins  08719;  Therapeutic Exercise:    10     mins  63449;     Neuromuscular Karen:    0    mins  30138;    Therapeutic Activity:     0     mins  94480;     Ultrasound:     0     mins  98181;    Electrical Stimulation:    0     mins  52848;    Untimed:  Electrical Stimulation:    0     mins  79438 (MC );  Fluidotherapy     0     mins  58019  Hot/cold pack     0     mins  78567    Timed Treatment:   23   mins   Total Treatment:     23   mins        Mo Hall OT  Occupational Therapist  "

## 2021-09-19 PROBLEM — N39.0 URINARY TRACT INFECTION WITH HEMATURIA: Status: ACTIVE | Noted: 2021-09-19

## 2021-09-19 PROBLEM — R31.9 URINARY TRACT INFECTION WITH HEMATURIA: Status: ACTIVE | Noted: 2021-09-19

## 2021-09-19 PROBLEM — B35.1 ONYCHOMYCOSIS: Status: ACTIVE | Noted: 2021-09-19

## 2021-09-19 PROBLEM — R30.0 DYSURIA: Status: ACTIVE | Noted: 2021-09-19

## 2021-09-20 ENCOUNTER — TREATMENT (OUTPATIENT)
Dept: PHYSICAL THERAPY | Facility: CLINIC | Age: 81
End: 2021-09-20

## 2021-09-20 DIAGNOSIS — Z98.890 STATUS POST SUBACROMIAL DECOMPRESSION: ICD-10-CM

## 2021-09-20 DIAGNOSIS — Z47.89 AFTERCARE FOLLOWING SURGERY OF THE MUSCULOSKELETAL SYSTEM: Primary | ICD-10-CM

## 2021-09-20 DIAGNOSIS — Z98.890 S/P RIGHT ROTATOR CUFF REPAIR: ICD-10-CM

## 2021-09-20 PROCEDURE — 97140 MANUAL THERAPY 1/> REGIONS: CPT | Performed by: OCCUPATIONAL THERAPIST

## 2021-09-20 PROCEDURE — 97110 THERAPEUTIC EXERCISES: CPT | Performed by: OCCUPATIONAL THERAPIST

## 2021-09-20 NOTE — PROGRESS NOTES
" Occupational Therapy Daily Progress Note        Patient: Sabi Haynes   : 1940  Diagnosis/ICD-10 Code:  Aftercare following surgery of the musculoskeletal system [Z47.89]  Referring practitioner: Fuad Squires MD  Date of Initial Visit: Type: THERAPY  Noted: 2021  Today's Date: 2021  Patient seen for 20 sessions             Subjective Evaluation    Pain  Current pain ratin         Sabi Haynes reports: \"It's doing pretty good especially considering it's raining\"    Objective          Neurological Testing     Additional Neurological Details  WNL B shoulders.    Active Range of Motion     Right Shoulder   Flexion: 60 degrees   Abduction: 60 degrees   External rotation 0°: 45 degrees     Left Elbow   Normal active range of motion    Right Elbow   Normal active range of motion    Left Wrist   Normal active range of motion    Right Wrist   Normal active range of motion    Additional Active Range of Motion Details  AROM measured standing.  Pt able to make full, composite fist.    Passive Range of Motion     Right Shoulder   Flexion: 135 degrees   Abduction: WFL  External rotation 90°: 45 degrees   Internal rotation 90°: 50 degrees         See Exercise, Manual, and Modality Logs for complete treatment.       Assessment/Plan    Progress per Plan of Care           Timed:  Manual Therapy:    15     mins  06664;  Therapeutic Exercise:    8     mins  44408;     Neuromuscular Karen:    0    mins  03245;    Therapeutic Activity:     0     mins  07457;     Ultrasound:     0     mins  50673;    Electrical Stimulation:    0     mins  26079;    Untimed:  Electrical Stimulation:    0     mins  67702 ( );  Fluidotherapy     0     mins  72415  Hot/cold pack     0     mins  40310    Timed Treatment:   23   mins   Total Treatment:     23   mins        Mo Hall OT  Occupational Therapist  "

## 2021-09-22 ENCOUNTER — OFFICE VISIT (OUTPATIENT)
Dept: ORTHOPEDIC SURGERY | Facility: CLINIC | Age: 81
End: 2021-09-22

## 2021-09-22 VITALS — OXYGEN SATURATION: 97 % | BODY MASS INDEX: 29.57 KG/M2 | HEART RATE: 80 BPM | HEIGHT: 66 IN | WEIGHT: 184 LBS

## 2021-09-22 DIAGNOSIS — Z47.89 AFTERCARE FOLLOWING SURGERY OF THE MUSCULOSKELETAL SYSTEM: Primary | ICD-10-CM

## 2021-09-22 PROCEDURE — 99212 OFFICE O/P EST SF 10 MIN: CPT | Performed by: PHYSICIAN ASSISTANT

## 2021-09-22 NOTE — PATIENT INSTRUCTIONS
Patient will continue PT, increase ROM and function as tolerable.   Advise to follow up with PCP regarding right upper extremity, following negative imaging / doppler prior to next visit.   Avoid quick sudden movements of right shoulder.   Follow up in 6 weeks.

## 2021-09-22 NOTE — PROGRESS NOTES
"Chief Complaint  Pain of the Right Shoulder    Subjective          Sabi Haynes presents to [facility] for s/p right shoulder arthroscopy with RCR, AMANDO and PATIENCE on 06/17/21 by Dr. Quintero. Patient states that she is still having swelling and pain of her mid humeral area. At her last visit, doppler US was ordered of the RUE, which was negative for acute findings. Patient states swelling has been present since prior to her surgery. Patient reports pain also along her scapula. Therapists states there is limitations to her PT, due to pain of mid humerus and along scapula.     Objective   Allergies   Allergen Reactions   • Atorvastatin Other (See Comments)     BONE PAIN   • Aspirin Other (See Comments), GI Intolerance and Unknown - Low Severity     \"MAKES ME LIGHTHEADED\"   • Ibuprofen Other (See Comments)     HISTORY OF GI ULCERS   • Morphine Other (See Comments)     \"HARD TIME WAKING UP\"   • Atenolol Other (See Comments)     sleepy   • Digoxin Other (See Comments)     \"MAKES ME FALL ASLEEP\"   • Latex Rash   • Meperidine Other (See Comments)     \"HARD TIME WAKING UP\"   • Metformin Diarrhea       Vital Signs:   Pulse 80   Ht 167.6 cm (66\")   Wt 83.5 kg (184 lb)   SpO2 97%   BMI 29.70 kg/m²       Physical Exam  Constitutional:       Appearance: Normal appearance. Patient is well-developed and normal weight.   HENT:      Head: Normocephalic.      Right Ear: Hearing and external ear normal.      Left Ear: Hearing and external ear normal.      Nose: Nose normal.   Eyes:      Conjunctiva/sclera: Conjunctivae normal.   Cardiovascular:      Rate and Rhythm: Normal rate.   Pulmonary:      Effort: Pulmonary effort is normal.      Breath sounds: No wheezing or rales.   Abdominal:      Palpations: Abdomen is soft.      Tenderness: There is no abdominal tenderness.   Musculoskeletal:      Cervical back: Normal range of motion.   Skin:     Findings: No rash.   Neurological:      Mental Status: Patient " is alert and oriented to person, place, and time.   Psychiatric:         Mood and Affect: Mood and affect normal.         Judgment: Judgment normal.     Ortho Exam  Right shoulder: Scars are well-healed.  Tenderness along the mid humerus.  Tenderness along the inferior medial border of the scapula.  Swelling along the mid humerus.  Sensation intact, neurovascular intact, radial and ulnar pulse are 2+.  AROM forward flexion is 60 degrees, active abduction is 65 degrees, internal rotation to back pocket.  Good muscle tone the deltoid, biceps and triceps muscles.  Full AROM of the elbow and the wrist.  Full AROM of the digits.  Strength in all planes is decreased, as compared to contralateral side.    Result Review :   The following data was reviewed by: ARNULFO Ta on 09/22/2021:         Imaging Results (Most Recent)     None         Study Impression 08/05/21    •     Right Internal Jugular:  No deep vein thrombosis noted.  •     Right Subclavian:  No deep vein thrombosis noted.  •     Right Axillary: No deep vein thrombosis noted.  •     Right Brachial: No deep vein thrombosis noted.  •     Right Radial: No deep vein thrombosis noted.  •     Right Ulnar: No deep vein thrombosis noted.  •     Right Basilic Upper Arm: No superficial thrombophlebitis noted.   •     Right Basilic Forearm: No superficial thrombophlebitis noted.  •     Right Cephalic Upper Arm: No superficial thrombophlebitis noted.  •     Right Cephalic Forearm: No superficial thrombophlebitis noted.  •     Left Internal Jugular: No deep vein thrombosis noted.          Assessment and Plan    Problem List Items Addressed This Visit        Musculoskeletal and Injuries    Aftercare following right shoulder arthroscopy - Primary          Follow Up   Return in about 6 weeks (around 11/3/2021).  Patient Instructions   Patient will continue PT, increase ROM and function as tolerable.   Advise to follow up with PCP regarding right upper extremity,  following negative imaging / doppler prior to next visit.   Avoid quick sudden movements of right shoulder.   Follow up in 6 weeks.     Patient was given instructions and counseling regarding her condition or for health maintenance advice. Please see specific information pulled into the AVS if appropriate.

## 2021-09-24 ENCOUNTER — TREATMENT (OUTPATIENT)
Dept: PHYSICAL THERAPY | Facility: CLINIC | Age: 81
End: 2021-09-24

## 2021-09-24 DIAGNOSIS — Z47.89 AFTERCARE FOLLOWING SURGERY OF THE MUSCULOSKELETAL SYSTEM: Primary | ICD-10-CM

## 2021-09-24 DIAGNOSIS — Z98.890 STATUS POST SUBACROMIAL DECOMPRESSION: ICD-10-CM

## 2021-09-24 DIAGNOSIS — Z98.890 S/P RIGHT ROTATOR CUFF REPAIR: ICD-10-CM

## 2021-09-24 PROCEDURE — 97140 MANUAL THERAPY 1/> REGIONS: CPT | Performed by: OCCUPATIONAL THERAPIST

## 2021-09-24 PROCEDURE — 97110 THERAPEUTIC EXERCISES: CPT | Performed by: OCCUPATIONAL THERAPIST

## 2021-09-24 NOTE — PROGRESS NOTES
" Occupational Therapy Daily Progress Note        Patient: Sabi Haynes   : 1940  Diagnosis/ICD-10 Code:  Aftercare following surgery of the musculoskeletal system [Z47.89]  Referring practitioner: Fuad Squires MD  Date of Initial Visit: Type: THERAPY  Noted: 2021  Today's Date: 2021  Patient seen for 21 sessions             Subjective Evaluation    Pain  Current pain ratin  Location: right arm         Sabi Haynes reports: \"It's been rough\"    Objective     See Exercise, Manual, and Modality Logs for complete treatment.       Assessment/Plan    Progress per Plan of Care           Timed:  Manual Therapy:    15     mins  39520;  Therapeutic Exercise:    8     mins  63549;     Neuromuscular Karen:    0    mins  42077;    Therapeutic Activity:     0     mins  76373;     Ultrasound:     0     mins  45022;    Electrical Stimulation:    0     mins  18370;    Untimed:  Electrical Stimulation:    0     mins  90168 ( );  Fluidotherapy     0     mins  04657  Hot/cold pack     0     mins  52042    Timed Treatment:   23   mins   Total Treatment:     23   mins        Mo Hall OT  Occupational Therapist  "

## 2021-09-27 ENCOUNTER — TREATMENT (OUTPATIENT)
Dept: PHYSICAL THERAPY | Facility: CLINIC | Age: 81
End: 2021-09-27

## 2021-09-27 DIAGNOSIS — Z98.890 STATUS POST SUBACROMIAL DECOMPRESSION: ICD-10-CM

## 2021-09-27 DIAGNOSIS — Z47.89 AFTERCARE FOLLOWING SURGERY OF THE MUSCULOSKELETAL SYSTEM: Primary | ICD-10-CM

## 2021-09-27 DIAGNOSIS — Z98.890 S/P RIGHT ROTATOR CUFF REPAIR: ICD-10-CM

## 2021-09-27 PROCEDURE — 97110 THERAPEUTIC EXERCISES: CPT | Performed by: OCCUPATIONAL THERAPIST

## 2021-09-27 PROCEDURE — 97140 MANUAL THERAPY 1/> REGIONS: CPT | Performed by: OCCUPATIONAL THERAPIST

## 2021-09-27 NOTE — PROGRESS NOTES
" Occupational Therapy Daily Progress Note        Patient: Sabi Haynes   : 1940  Diagnosis/ICD-10 Code:  Aftercare following surgery of the musculoskeletal system [Z47.89]  Referring practitioner: Fuad Squires MD  Date of Initial Visit: Type: THERAPY  Noted: 2021  Today's Date: 2021  Patient seen for 22 sessions             Subjective Evaluation    Pain  Current pain ratin         Sabi Haynes reports: \"It's doing better today\"    Objective     See Exercise, Manual, and Modality Logs for complete treatment.       Assessment/Plan    Progress per Plan of Care           Timed:  Manual Therapy:    13     mins  13233;  Therapeutic Exercise:    10     mins  27475;     Neuromuscular Karen:    0    mins  35647;    Therapeutic Activity:     0     mins  37308;     Ultrasound:     0     mins  06569;    Electrical Stimulation:    0     mins  79905;    Untimed:  Electrical Stimulation:    0     mins  48327 ( );  Fluidotherapy     0     mins  37859  Hot/cold pack     0     mins  95444    Timed Treatment:   23   mins   Total Treatment:     23   mins        Mo Hall OT  Occupational Therapist  "

## 2021-10-01 ENCOUNTER — TREATMENT (OUTPATIENT)
Dept: PHYSICAL THERAPY | Facility: CLINIC | Age: 81
End: 2021-10-01

## 2021-10-01 DIAGNOSIS — Z98.890 STATUS POST SUBACROMIAL DECOMPRESSION: ICD-10-CM

## 2021-10-01 DIAGNOSIS — Z47.89 AFTERCARE FOLLOWING SURGERY OF THE MUSCULOSKELETAL SYSTEM: Primary | ICD-10-CM

## 2021-10-01 DIAGNOSIS — Z98.890 S/P RIGHT ROTATOR CUFF REPAIR: ICD-10-CM

## 2021-10-01 PROCEDURE — 97140 MANUAL THERAPY 1/> REGIONS: CPT | Performed by: OCCUPATIONAL THERAPIST

## 2021-10-01 PROCEDURE — 97110 THERAPEUTIC EXERCISES: CPT | Performed by: OCCUPATIONAL THERAPIST

## 2021-10-01 NOTE — PROGRESS NOTES
Patient: Sabi Haynes   : 1940  Diagnosis/ICD-10 Code:  Aftercare following surgery of the musculoskeletal system [Z47.89]  Referring practitioner: Fuad Squires MD  Date of Initial Visit: Type: THERAPY  Noted: 2021  Today's Date: 10/1/2021  Patient seen for 23 sessions      Subjective:   Subjective Questionnaire: QuickDASH: 23/55 (20-39%)  Clinical Progress: improved  Home Program Compliance: Yes  Treatment has included: therapeutic exercise, manual therapy, moist heat and cryotherapy    Subjective   Objective          Neurological Testing     Additional Neurological Details  WNL B shoulders.    Active Range of Motion     Right Shoulder   Flexion: 80 degrees   Abduction: 70 degrees   External rotation 0°: 55 degrees     Left Elbow   Normal active range of motion    Right Elbow   Normal active range of motion    Left Wrist   Normal active range of motion    Right Wrist   Normal active range of motion    Additional Active Range of Motion Details  AROM measured standing.  Pt able to make full, composite fist.    Passive Range of Motion     Right Shoulder   Flexion: 135 degrees   Abduction: WFL  External rotation 90°: 45 degrees   Internal rotation 90°: 50 degrees       Assessment & Plan     Assessment  Impairments: abnormal or restricted ROM, activity intolerance, impaired physical strength, lacks appropriate home exercise program and pain with function  Assessment details: Pt has improved slightly with AROM. Pt reports pain in inferior angle of scapula and middle of upper arm as biggest barriers to therapy. Pt reports isometrics increase pain and swelling. OT instructed pt to discontinue isometrics at this time.  Prognosis: fair  Functional Limitations: carrying objects, lifting, sleeping, pulling, pushing, moving in bed, reaching behind back and reaching overhead  Goals  Plan Goals: SHOULDER  PROBLEMS:     1. The patient has limited ROM of the right shoulder.    LTG 1: 12 weeks:  The patient  will demonstrate 140 degrees of right shoulder flexion, 140 degrees of shoulder abduction, 60 degrees of shoulder external rotation, and 60 degrees of shoulder internal rotation to allow the patient to reach into upper kitchen cabinets and manipulate clothing behind the back with greater ease.    STATUS:  New   STG 1a: 8 weeks:  The patient will demonstrate 140 degrees of passive right shoulder flexion, 140 degrees of passive right shoulder abduction, 60 degrees of passive right shoulder external rotation, and 60 degrees of passive right shoulder internal rotation.    STATUS:  Not met: progressing   TREATMENT: Manual therapy, therapeutic exercise, home exercise instruction, and modalities as needed to include: electrical stimulation, ultrasound, moist heat, and ice.    2. The patient has limited strength of the right shoulder.   LTG 2: 12 weeks:  The patient will demonstrate 4 /5 strength for right shoulder flexion, abduction, external rotation, and internal rotation in order to demonstrate improved shoulder stability.    STATUS:  New   STG 2a: 8 weeks:  The patient will demonstrate 3 /5 strength for right shoulder flexion, abduction, external rotation, and internal rotation.    STATUS:  Not met: progressing   TREATMENT: Manual therapy, therapeutic exercise, home exercise instruction, and modalities as needed to include: electrical stimulation, ultrasound, moist heat, and ice.     3. Carrying, Moving, and Handling Objects Functional Limitation     LTG 3: 12 weeks:  The patient will demonstrate 1-19 % limitation by achieving a score of 19 on the QuickDASH.    STATUS:  New   STG 3a: 8 weeks:  The patient will demonstrate 20-39 % limitation by achieving a score of 27 on the QuickDASH.      STATUS:  Met   TREATMENT:  Manual therapy, therapeutic exercise, home exercise instruction, and modalities as needed to include: moist heat, electrical stimulation, and ultrasound.      Progress toward previous goals: Partially Met       Recommendations: Continue as planned  Timeframe: 1 month  Prognosis to achieve goals: fair    OT SIGNATURE: Mo Hall OT   DATE TREATMENT INITIATED: 10/1/2021  Certification Period: 10/1/2021 - 12/30/2021        Based upon review of the patient's progress and continued therapy plan, it is my medical opinion that Sabi Haynes should continue physical therapy treatment at [facility]  [address] [place] [id number] [phone].    Signature: __________________________________  Fuad Squires MD    Timed:  Manual Therapy:    15     mins  37145;  Therapeutic Exercise:    8     mins  61041;     Neuromuscular Karen:    0    mins  45311;    Therapeutic Activity:     0     mins  98978;     Ultrasound:     0     mins  14279;    Electrical Stimulation:    0     mins  48528 ( );    Untimed:  Electrical Stimulation:    0     mins  85192 ( );  Fluidotherapy     0     mins  59958  Hot/cold pack     0     mins  80951    Timed Treatment:   23   mins   Total Treatment:     23   mins  Please sign and return via fax to [phone]  . Thank you, [facility].

## 2021-10-04 ENCOUNTER — TREATMENT (OUTPATIENT)
Dept: PHYSICAL THERAPY | Facility: CLINIC | Age: 81
End: 2021-10-04

## 2021-10-04 DIAGNOSIS — Z47.89 AFTERCARE FOLLOWING SURGERY OF THE MUSCULOSKELETAL SYSTEM: Primary | ICD-10-CM

## 2021-10-04 DIAGNOSIS — Z98.890 S/P RIGHT ROTATOR CUFF REPAIR: ICD-10-CM

## 2021-10-04 DIAGNOSIS — Z98.890 STATUS POST SUBACROMIAL DECOMPRESSION: ICD-10-CM

## 2021-10-04 PROCEDURE — 97110 THERAPEUTIC EXERCISES: CPT | Performed by: OCCUPATIONAL THERAPIST

## 2021-10-04 PROCEDURE — 97140 MANUAL THERAPY 1/> REGIONS: CPT | Performed by: OCCUPATIONAL THERAPIST

## 2021-10-04 NOTE — PROGRESS NOTES
Patient: Sabi Haynes   : 1940  Diagnosis/ICD-10 Code:  Aftercare following surgery of the musculoskeletal system [Z47.89]  Referring practitioner: Fuad Squires MD  Date of Initial Visit: Type: THERAPY  Noted: 2021  Today's Date: 10/4/2021  Patient seen for 24 sessions      Subjective:   Subjective Questionnaire: QuickDASH: 23/55 (20-39%)  Clinical Progress: improved  Home Program Compliance: Yes  Treatment has included: therapeutic exercise, manual therapy, moist heat and cryotherapy    Subjective Evaluation    Pain  Current pain rating: 3  Location: right lower scapula         Objective          Neurological Testing     Additional Neurological Details  WNL B shoulders.    Active Range of Motion     Right Shoulder   Flexion: 80 degrees   Abduction: 70 degrees   External rotation 0°: 55 degrees     Left Elbow   Normal active range of motion    Right Elbow   Normal active range of motion    Left Wrist   Normal active range of motion    Right Wrist   Normal active range of motion    Additional Active Range of Motion Details  AROM measured standing.  Pt able to make full, composite fist.    Passive Range of Motion     Right Shoulder   Flexion: 135 degrees   Abduction: WFL  External rotation 90°: 45 degrees   Internal rotation 90°: 50 degrees       Assessment & Plan     Assessment  Impairments: abnormal or restricted ROM, activity intolerance, impaired physical strength, lacks appropriate home exercise program and pain with function  Assessment details: Pt has improved slightly with AROM. Pt reports pain in inferior angle of scapula and middle of upper arm as biggest barriers to therapy. Pt reports isometrics increase pain and swelling. OT instructed pt to discontinue isometrics at this time.  Prognosis: fair  Functional Limitations: carrying objects, lifting, sleeping, pulling, pushing, moving in bed, reaching behind back and reaching overhead  Goals  Plan Goals: SHOULDER  PROBLEMS:     1. The  patient has limited ROM of the right shoulder.    LTG 1: 12 weeks:  The patient will demonstrate 140 degrees of right shoulder flexion, 140 degrees of shoulder abduction, 60 degrees of shoulder external rotation, and 60 degrees of shoulder internal rotation to allow the patient to reach into upper kitchen cabinets and manipulate clothing behind the back with greater ease.    STATUS:  New   STG 1a: 8 weeks:  The patient will demonstrate 140 degrees of passive right shoulder flexion, 140 degrees of passive right shoulder abduction, 60 degrees of passive right shoulder external rotation, and 60 degrees of passive right shoulder internal rotation.    STATUS:  Not met: progressing   TREATMENT: Manual therapy, therapeutic exercise, home exercise instruction, and modalities as needed to include: electrical stimulation, ultrasound, moist heat, and ice.    2. The patient has limited strength of the right shoulder.   LTG 2: 12 weeks:  The patient will demonstrate 4 /5 strength for right shoulder flexion, abduction, external rotation, and internal rotation in order to demonstrate improved shoulder stability.    STATUS:  New   STG 2a: 8 weeks:  The patient will demonstrate 3 /5 strength for right shoulder flexion, abduction, external rotation, and internal rotation.    STATUS:  Not met: progressing   TREATMENT: Manual therapy, therapeutic exercise, home exercise instruction, and modalities as needed to include: electrical stimulation, ultrasound, moist heat, and ice.     3. Carrying, Moving, and Handling Objects Functional Limitation     LTG 3: 12 weeks:  The patient will demonstrate 1-19 % limitation by achieving a score of 19 on the QuickDASH.    STATUS:  New   STG 3a: 8 weeks:  The patient will demonstrate 20-39 % limitation by achieving a score of 27 on the QuickDASH.      STATUS:  Met   TREATMENT:  Manual therapy, therapeutic exercise, home exercise instruction, and modalities as needed to include: moist heat, electrical  stimulation, and ultrasound.      Progress toward previous goals: Partially Met      Recommendations: Continue as planned  Timeframe: 1 month  Prognosis to achieve goals: fair    OT SIGNATURE: Mo Hall OT   DATE TREATMENT INITIATED: 10/4/2021  Certification Period: 10/4/2021 - 1/2/2022        Based upon review of the patient's progress and continued therapy plan, it is my medical opinion that Sabi Haynes should continue physical therapy treatment at [facility]  [address] [place] [id number] [phone].    Signature: __________________________________  Fuad Squires MD    Timed:  Manual Therapy:    15     mins  98967;  Therapeutic Exercise:    8     mins  16651;     Neuromuscular Karen:    0    mins  01440;    Therapeutic Activity:     0     mins  12845;     Ultrasound:     0     mins  70692;    Electrical Stimulation:    0     mins  89845 ( );    Untimed:  Electrical Stimulation:    0     mins  84391 ( );  Fluidotherapy     0     mins  59430  Hot/cold pack     0     mins  85457    Timed Treatment:   23   mins   Total Treatment:     23   mins  Please sign and return via fax to [phone]  . Thank you, [facility].

## 2021-10-12 ENCOUNTER — HOSPITAL ENCOUNTER (OUTPATIENT)
Dept: ULTRASOUND IMAGING | Facility: HOSPITAL | Age: 81
Discharge: HOME | End: 2021-10-12

## 2021-10-12 ENCOUNTER — TRANSCRIBE ORDERS (OUTPATIENT)
Dept: ADMINISTRATIVE | Facility: HOSPITAL | Age: 81
End: 2021-10-12

## 2021-10-12 ENCOUNTER — LAB (OUTPATIENT)
Dept: LAB | Facility: HOSPITAL | Age: 81
End: 2021-10-12

## 2021-10-12 DIAGNOSIS — N31.9 NEUROGENIC BLADDER: ICD-10-CM

## 2021-10-12 DIAGNOSIS — N31.9 NEUROGENIC BLADDER: Primary | ICD-10-CM

## 2021-10-12 LAB
ANION GAP SERPL CALCULATED.3IONS-SCNC: 9.3 MMOL/L (ref 5–15)
BUN SERPL-MCNC: 19 MG/DL (ref 8–23)
BUN/CREAT SERPL: 18.8 (ref 7–25)
CALCIUM SPEC-SCNC: 9.5 MG/DL (ref 8.6–10.5)
CHLORIDE SERPL-SCNC: 101 MMOL/L (ref 98–107)
CO2 SERPL-SCNC: 26.7 MMOL/L (ref 22–29)
CREAT SERPL-MCNC: 1.01 MG/DL (ref 0.57–1)
GFR SERPL CREATININE-BSD FRML MDRD: 53 ML/MIN/1.73
GLUCOSE SERPL-MCNC: 229 MG/DL (ref 65–99)
POTASSIUM SERPL-SCNC: 4.1 MMOL/L (ref 3.5–5.2)
SODIUM SERPL-SCNC: 137 MMOL/L (ref 136–145)

## 2021-10-12 PROCEDURE — 80048 BASIC METABOLIC PNL TOTAL CA: CPT

## 2021-10-12 PROCEDURE — 76775 US EXAM ABDO BACK WALL LIM: CPT

## 2021-10-15 ENCOUNTER — TREATMENT (OUTPATIENT)
Dept: PHYSICAL THERAPY | Facility: CLINIC | Age: 81
End: 2021-10-15

## 2021-10-15 ENCOUNTER — OFFICE VISIT (OUTPATIENT)
Dept: FAMILY MEDICINE CLINIC | Facility: CLINIC | Age: 81
End: 2021-10-15

## 2021-10-15 VITALS
SYSTOLIC BLOOD PRESSURE: 128 MMHG | HEART RATE: 80 BPM | TEMPERATURE: 98.3 F | WEIGHT: 180.6 LBS | BODY MASS INDEX: 29.02 KG/M2 | DIASTOLIC BLOOD PRESSURE: 84 MMHG | HEIGHT: 66 IN | OXYGEN SATURATION: 97 %

## 2021-10-15 DIAGNOSIS — M79.621 PAIN IN RIGHT UPPER ARM: Primary | ICD-10-CM

## 2021-10-15 DIAGNOSIS — Z98.890 STATUS POST SUBACROMIAL DECOMPRESSION: ICD-10-CM

## 2021-10-15 DIAGNOSIS — Z98.890 S/P RIGHT ROTATOR CUFF REPAIR: ICD-10-CM

## 2021-10-15 DIAGNOSIS — Z47.89 AFTERCARE FOLLOWING SURGERY OF THE MUSCULOSKELETAL SYSTEM: Primary | ICD-10-CM

## 2021-10-15 DIAGNOSIS — M25.511 RIGHT SHOULDER PAIN, UNSPECIFIED CHRONICITY: ICD-10-CM

## 2021-10-15 DIAGNOSIS — R30.0 DYSURIA: ICD-10-CM

## 2021-10-15 PROCEDURE — 99214 OFFICE O/P EST MOD 30 MIN: CPT | Performed by: FAMILY MEDICINE

## 2021-10-15 PROCEDURE — 81002 URINALYSIS NONAUTO W/O SCOPE: CPT | Performed by: FAMILY MEDICINE

## 2021-10-15 PROCEDURE — 97140 MANUAL THERAPY 1/> REGIONS: CPT | Performed by: OCCUPATIONAL THERAPIST

## 2021-10-15 NOTE — PROGRESS NOTES
Chief Complaint  Chief Complaint   Patient presents with   • Arm Pain     right   • Back Pain     low   • Urinary Tract Infection       HPI:  Sabi Haynes presents to [facility]     Pt reports she had her right upper arm checked for blood clots and it was normal.  Pt now has a left renal cyst and reports her family members have them also.     Pt reports she is not sure that she has a bladder infection or not.  Pt reports she can hardly walk and she has to lay down a lot.     Medical History:  Past History:  Medical History: has a past medical history of Acid reflux, Arthritis, Bladder disorder, Bladder paralysis, Blind spot scotoma, left, Condition not found, DDD (degenerative disc disease), cervical, DDD (degenerative disc disease), lumbar, Diabetes mellitus (HCC), Gastric ulcer, Hearing loss, Heart attack (HCC), Heart disease, Hemorrhoids, History of confusion, History of migraine headaches, Hyperlipemia, Hyperlipidemia, Hypothyroidism, Incontinence of bowel, Incontinence of urine, Kidney disease, Left hand pain, Limb swelling, Macular degeneration, PONV (postoperative nausea and vomiting), Primary osteoarthritis of right knee (08/24/2018), Recent total retinal detachment, Renal calculi, Right BBB/left ant fasc block, Ringing in ears, Rotator cuff tear (6/17/2021), S/P arthroscopic partial medial meniscectomy (12/08/2017), S/P medial meniscectomy of right knee (10/11/2017), Seasonal allergies, Self-catheterizes urinary bladder, Short-term memory loss, Shortness of breath, Spinal stenosis, Stroke (HCC), Thyroid disorder, and Vitamin D deficiency.   Surgical History: has a past surgical history that includes Varicose vein surgery; Cholecystectomy; Hysterectomy; Laparoscopic tubal ligation; Tear duct surgery; Extracorporeal shock wave lithotripsy (Left, 9/13/2019); Other surgical history; Bladder surgery (2002); Carpal tunnel release; Colonoscopy; Cyst Removal; Gallbladder  surgery; Total hip arthroplasty (2010); Hysterectomy (1969); Knee surgery (08/22); Eye surgery; Joint replacement; and Shoulder arthroscopy w/ rotator cuff repair (Right, 6/17/2021).   Family History: family history includes Arthritis in her father and mother; Cancer in her father; Diabetes in her mother; Heart disease in her mother; Leukemia in her father; Osteoporosis in her brother; Stroke in her son.   Social History: reports that she has never smoked. She has never used smokeless tobacco. She reports that she does not drink alcohol and does not use drugs.  Immunization History   Administered Date(s) Administered   • PPD Test 06/22/2021   • Tdap 04/11/2017         Allergies: Atorvastatin, Aspirin, Ibuprofen, Morphine, Atenolol, Digoxin, Latex, Meperidine, and Metformin     Medications:  Current Outpatient Medications on File Prior to Visit   Medication Sig Dispense Refill   • Cholecalciferol (VITAMIN D3) 5000 units capsule capsule Take 5,000 Units by mouth Daily.     • HYDROcodone-acetaminophen (Norco) 7.5-325 MG per tablet Take 1 tablet by mouth Every 6 (Six) Hours As Needed for Moderate Pain . 28 tablet 0   • Insulin Glargine (Lantus SoloStar) 100 UNIT/ML injection pen Inject 50 Units under the skin into the appropriate area as directed Every Evening for 90 days. TAKES AROUND 1430 EVERY DAY. INSTRUCTED PER ANESTHESIA PROTOCOL 15 pen 3   • levothyroxine (SYNTHROID, LEVOTHROID) 112 MCG tablet Take 112 mcg by mouth Daily.     • vitamin B-12 (CYANOCOBALAMIN) 500 MCG tablet Take 500 mcg by mouth Daily.     • ciclopirox (LOPROX) 0.77 % suspension Apply  topically to the appropriate area as directed Every 12 (Twelve) Hours. 60 mL 1   • [DISCONTINUED] ciprofloxacin (Cipro) 500 MG tablet Take 1 tablet by mouth 2 (Two) Times a Day for 7 days. 14 tablet 0     No current facility-administered medications on file prior to visit.        Health Maintenance Due   Topic Date Due   • DXA SCAN  Never done   • Pneumococcal  "Vaccine 65+ (1 of 2 - PPSV23) Never done   • ZOSTER VACCINE (1 of 2) Never done   • URINE MICROALBUMIN  09/27/2020   • DIABETIC EYE EXAM  02/04/2021   • INFLUENZA VACCINE  Never done   • ANNUAL WELLNESS VISIT  09/08/2021       Vital Signs:   Vitals:    10/15/21 1505   BP: 128/84   Pulse: 80   Temp: 98.3 °F (36.8 °C)   SpO2: 97%   Weight: 81.9 kg (180 lb 9.6 oz)   Height: 167.6 cm (66\")      Body mass index is 29.15 kg/m².     ROS:  Review of Systems   Constitutional: Negative for fatigue and fever.   HENT: Negative for congestion, ear pain and sinus pressure.    Respiratory: Negative for cough, chest tightness and shortness of breath.    Cardiovascular: Negative for chest pain, palpitations and leg swelling.   Gastrointestinal: Negative for abdominal pain and diarrhea.   Genitourinary: Negative for dysuria and frequency.   Neurological: Negative for speech difficulty, headache and confusion.   Psychiatric/Behavioral: Negative for agitation and behavioral problems.          Physical Exam  Constitutional:       Appearance: Normal appearance.   HENT:      Right Ear: Tympanic membrane normal.      Left Ear: Tympanic membrane normal.      Nose: Nose normal.   Eyes:      Extraocular Movements: Extraocular movements intact.      Conjunctiva/sclera: Conjunctivae normal.      Pupils: Pupils are equal, round, and reactive to light.   Cardiovascular:      Rate and Rhythm: Normal rate and regular rhythm.   Pulmonary:      Effort: Pulmonary effort is normal.      Breath sounds: Normal breath sounds.   Abdominal:      General: Bowel sounds are normal.   Musculoskeletal:         General: Normal range of motion.      Cervical back: Normal range of motion.   Skin:     General: Skin is warm and dry.   Neurological:      General: No focal deficit present.      Mental Status: She is alert and oriented to person, place, and time.   Psychiatric:         Mood and Affect: Mood normal.         Behavior: Behavior normal.          Result " Review    Hemoglobin A1c (08/20/2021 15:07)  Basic Metabolic Panel (10/12/2021 11:29)  Urine Culture - Urine, Urine, Clean Catch (08/20/2021 15:26)       Diagnoses and all orders for this visit:    1. Pain in right upper arm (Primary)  -     XR Humerus Right; Future  -     MRI Humerus Right Without Contrast; Future    2. Right shoulder pain, unspecified chronicity          Smoking Cessation:    Sabi Haynes  reports that she has never smoked. She has never used smokeless tobacco.          Follow Up   Return in about 3 months (around 1/15/2022).  Patient was given instructions and counseling regarding her condition or for health maintenance advice. Please see specific information pulled into the AVS if appropriate.       Drea Villalpando MD

## 2021-10-15 NOTE — PROGRESS NOTES
" Occupational Therapy Daily Progress Note        Patient: Sabi Haynes   : 1940  Diagnosis/ICD-10 Code:  Aftercare following surgery of the musculoskeletal system [Z47.89]  Referring practitioner: Fuad Squires MD  Date of Initial Visit: Type: THERAPY  Noted: 2021  Today's Date: 10/15/2021  Patient seen for 25 sessions             Subjective   Sabi Haynes reports: \"I've had a rough week but I'm not having any pain right now. I don't want to do any exercises today I just want to be stretched\"    Objective     See Exercise, Manual, and Modality Logs for complete treatment.       Assessment/Plan    Progress per Plan of Care           Timed:  Manual Therapy:    23     mins  34099;  Therapeutic Exercise:    0     mins  79755;     Neuromuscular Karen:    0    mins  28457;    Therapeutic Activity:     0     mins  31829;     Ultrasound:     0     mins  70414;    Electrical Stimulation:    0     mins  91615;    Untimed:  Electrical Stimulation:    0     mins  66138 (MC );  Fluidotherapy     0     mins  13384  Hot/cold pack     0     mins  71834    Timed Treatment:   23   mins   Total Treatment:     23   mins        Mo Hall OT  Occupational Therapist  "

## 2021-10-18 ENCOUNTER — TELEPHONE (OUTPATIENT)
Dept: PHYSICAL THERAPY | Facility: CLINIC | Age: 81
End: 2021-10-18

## 2021-10-20 LAB
BILIRUB BLD-MCNC: NEGATIVE MG/DL
CLARITY, POC: CLEAR
COLOR UR: YELLOW
GLUCOSE UR STRIP-MCNC: NEGATIVE MG/DL
KETONES UR QL: NEGATIVE
LEUKOCYTE EST, POC: NEGATIVE
NITRITE UR-MCNC: NEGATIVE MG/ML
PH UR: 5 [PH] (ref 5–8)
PROT UR STRIP-MCNC: NEGATIVE MG/DL
RBC # UR STRIP: NEGATIVE /UL
SP GR UR: 1.03 (ref 1–1.03)
UROBILINOGEN UR QL: NORMAL

## 2021-10-22 ENCOUNTER — TREATMENT (OUTPATIENT)
Dept: PHYSICAL THERAPY | Facility: CLINIC | Age: 81
End: 2021-10-22

## 2021-10-22 DIAGNOSIS — Z98.890 STATUS POST SUBACROMIAL DECOMPRESSION: ICD-10-CM

## 2021-10-22 DIAGNOSIS — Z98.890 S/P RIGHT ROTATOR CUFF REPAIR: ICD-10-CM

## 2021-10-22 DIAGNOSIS — Z47.89 AFTERCARE FOLLOWING SURGERY OF THE MUSCULOSKELETAL SYSTEM: Primary | ICD-10-CM

## 2021-10-22 PROCEDURE — 97140 MANUAL THERAPY 1/> REGIONS: CPT | Performed by: OCCUPATIONAL THERAPIST

## 2021-10-22 PROCEDURE — 97110 THERAPEUTIC EXERCISES: CPT | Performed by: OCCUPATIONAL THERAPIST

## 2021-10-22 NOTE — PROGRESS NOTES
" Occupational Therapy Daily Progress Note        Patient: Sabi Haynes   : 1940  Diagnosis/ICD-10 Code:  Aftercare following surgery of the musculoskeletal system [Z47.89]  Referring practitioner: Fuad Squires MD  Date of Initial Visit: Type: THERAPY  Noted: 2021  Today's Date: 10/22/2021  Patient seen for 26 sessions             Subjective   Sabi Haynes reports: \"My shoulder is feeling so much better\"    Objective     See Exercise, Manual, and Modality Logs for complete treatment.       Assessment/Plan    Progress per Plan of Care           Timed:  Manual Therapy:    15     mins  56731;  Therapeutic Exercise:    8     mins  90695;     Neuromuscular Karen:    0    mins  39335;    Therapeutic Activity:     0     mins  90934;     Ultrasound:     0     mins  60026;    Electrical Stimulation:    0     mins  60728;    Untimed:  Electrical Stimulation:    0     mins  36677 ( );  Fluidotherapy     0     mins  68566  Hot/cold pack     0     mins  72986    Timed Treatment:   23   mins   Total Treatment:     23   mins        Mo Hall OT  Occupational Therapist  [place] License: [id number]  "

## 2021-10-25 ENCOUNTER — TREATMENT (OUTPATIENT)
Dept: PHYSICAL THERAPY | Facility: CLINIC | Age: 81
End: 2021-10-25

## 2021-10-25 DIAGNOSIS — Z98.890 STATUS POST SUBACROMIAL DECOMPRESSION: ICD-10-CM

## 2021-10-25 DIAGNOSIS — Z98.890 S/P RIGHT ROTATOR CUFF REPAIR: ICD-10-CM

## 2021-10-25 DIAGNOSIS — Z47.89 AFTERCARE FOLLOWING SURGERY OF THE MUSCULOSKELETAL SYSTEM: Primary | ICD-10-CM

## 2021-10-25 PROCEDURE — 97110 THERAPEUTIC EXERCISES: CPT | Performed by: OCCUPATIONAL THERAPIST

## 2021-10-25 PROCEDURE — 97140 MANUAL THERAPY 1/> REGIONS: CPT | Performed by: OCCUPATIONAL THERAPIST

## 2021-10-25 NOTE — PROGRESS NOTES
" Occupational Therapy Daily Progress Note        Patient: Sabi Haynes   : 1940  Diagnosis/ICD-10 Code:  Aftercare following surgery of the musculoskeletal system [Z47.89]  Referring practitioner: Fuad Squires MD  Date of Initial Visit: Type: THERAPY  Noted: 2021  Today's Date: 10/25/2021  Patient seen for 27 sessions             Subjective Evaluation    Pain  Current pain ratin         Sabi Haynes reports: \"It's doing good today\"    Objective     See Exercise, Manual, and Modality Logs for complete treatment.       Assessment/Plan    Progress per Plan of Care           Timed:  Manual Therapy:    13     mins  75108;  Therapeutic Exercise:    10     mins  91805;     Neuromuscular Karen:    0    mins  04783;    Therapeutic Activity:     0     mins  50268;     Ultrasound:     0     mins  36484;    Electrical Stimulation:    0     mins  29198;    Untimed:  Electrical Stimulation:    0     mins  46409 ( );  Fluidotherapy     0     mins  35291  Hot/cold pack     0     mins  07430    Timed Treatment:   23   mins   Total Treatment:     23   mins        Mo Hall OT  Occupational Therapist  [place] License: [id number]  "

## 2021-10-29 ENCOUNTER — TREATMENT (OUTPATIENT)
Dept: PHYSICAL THERAPY | Facility: CLINIC | Age: 81
End: 2021-10-29

## 2021-10-29 DIAGNOSIS — Z47.89 AFTERCARE FOLLOWING SURGERY OF THE MUSCULOSKELETAL SYSTEM: Primary | ICD-10-CM

## 2021-10-29 DIAGNOSIS — Z98.890 STATUS POST SUBACROMIAL DECOMPRESSION: ICD-10-CM

## 2021-10-29 DIAGNOSIS — Z98.890 S/P RIGHT ROTATOR CUFF REPAIR: ICD-10-CM

## 2021-10-29 PROCEDURE — 97140 MANUAL THERAPY 1/> REGIONS: CPT | Performed by: OCCUPATIONAL THERAPIST

## 2021-10-29 PROCEDURE — 97110 THERAPEUTIC EXERCISES: CPT | Performed by: OCCUPATIONAL THERAPIST

## 2021-10-29 NOTE — PROGRESS NOTES
" Occupational Therapy Daily Progress Note        Patient: Sabi Haynes   : 1940  Diagnosis/ICD-10 Code:  Aftercare following surgery of the musculoskeletal system [Z47.89]  Referring practitioner: Fuad Squires MD  Date of Initial Visit: Type: THERAPY  Noted: 2021  Today's Date: 10/29/2021  Patient seen for 28 sessions             Subjective Evaluation    Pain  Current pain ratin         Sabi Haynes reports: \"I was a little sore after last time. I also slept on it wrong last night but I'm not having any pain right now\".    Objective     See Exercise, Manual, and Modality Logs for complete treatment.       Assessment/Plan    Progress per Plan of Care           Timed:  Manual Therapy:    13     mins  12982;  Therapeutic Exercise:    10     mins  64949;     Neuromuscular Karen:    0    mins  24411;    Therapeutic Activity:     0     mins  52451;     Ultrasound:     0     mins  52477;    Electrical Stimulation:    0     mins  73985;    Untimed:  Electrical Stimulation:    0     mins  28299 ( );  Fluidotherapy     0     mins  60654  Hot/cold pack     0     mins  14713    Timed Treatment:   23   mins   Total Treatment:     23   mins        Mo Hall OT  Occupational Therapist  [place] License: [id number]  "

## 2021-11-01 ENCOUNTER — TREATMENT (OUTPATIENT)
Dept: PHYSICAL THERAPY | Facility: CLINIC | Age: 81
End: 2021-11-01

## 2021-11-01 DIAGNOSIS — Z98.890 S/P RIGHT ROTATOR CUFF REPAIR: ICD-10-CM

## 2021-11-01 DIAGNOSIS — Z47.89 AFTERCARE FOLLOWING SURGERY OF THE MUSCULOSKELETAL SYSTEM: Primary | ICD-10-CM

## 2021-11-01 DIAGNOSIS — Z98.890 STATUS POST SUBACROMIAL DECOMPRESSION: ICD-10-CM

## 2021-11-01 PROCEDURE — 97140 MANUAL THERAPY 1/> REGIONS: CPT | Performed by: OCCUPATIONAL THERAPIST

## 2021-11-01 PROCEDURE — 97110 THERAPEUTIC EXERCISES: CPT | Performed by: OCCUPATIONAL THERAPIST

## 2021-11-01 NOTE — PROGRESS NOTES
Patient: Sabi Haynes   : 1940  Diagnosis/ICD-10 Code:  Aftercare following surgery of the musculoskeletal system [Z47.89]  Referring practitioner: Fuad Squires MD  Date of Initial Visit: Type: THERAPY  Noted: 2021  Today's Date: 2021  Patient seen for 29 sessions      Subjective:   Subjective Questionnaire: QuickDASH:   Clinical Progress: improved  Home Program Compliance: Yes  Treatment has included: therapeutic exercise, manual therapy, moist heat and cryotherapy    Subjective   Objective          Neurological Testing     Additional Neurological Details  WNL B shoulders.    Active Range of Motion     Right Shoulder   Flexion: 92 degrees   Abduction: 90 degrees   External rotation 0°: 45 degrees     Left Elbow   Normal active range of motion    Right Elbow   Normal active range of motion    Left Wrist   Normal active range of motion    Right Wrist   Normal active range of motion    Additional Active Range of Motion Details  AROM measured standing.  Pt able to make full, composite fist.    Passive Range of Motion     Right Shoulder   Flexion: 140 degrees   Abduction: WFL  External rotation 90°: 53 degrees   Internal rotation 90°: 50 degrees       Assessment & Plan     Assessment  Impairments: abnormal or restricted ROM, activity intolerance, impaired physical strength, lacks appropriate home exercise program and pain with function  Assessment details: Pt has improved slightly with AROM. Pt reports pain in inferior angle of scapula and middle of upper arm as biggest barriers to therapy.  Prognosis: fair  Functional Limitations: carrying objects, lifting, sleeping, pulling, pushing, moving in bed, reaching behind back and reaching overhead  Goals  Plan Goals: SHOULDER  PROBLEMS:     1. The patient has limited ROM of the right shoulder.    LTG 1: 12 weeks:  The patient will demonstrate 140 degrees of right shoulder flexion, 140 degrees of shoulder abduction, 60 degrees of shoulder  external rotation, and 60 degrees of shoulder internal rotation to allow the patient to reach into upper kitchen cabinets and manipulate clothing behind the back with greater ease.    STATUS:  New   STG 1a: 8 weeks:  The patient will demonstrate 140 degrees of passive right shoulder flexion, 140 degrees of passive right shoulder abduction, 60 degrees of passive right shoulder external rotation, and 60 degrees of passive right shoulder internal rotation.    STATUS:  Not met: progressing   TREATMENT: Manual therapy, therapeutic exercise, home exercise instruction, and modalities as needed to include: electrical stimulation, ultrasound, moist heat, and ice.    2. The patient has limited strength of the right shoulder.   LTG 2: 12 weeks:  The patient will demonstrate 4 /5 strength for right shoulder flexion, abduction, external rotation, and internal rotation in order to demonstrate improved shoulder stability.    STATUS:  New   STG 2a: 8 weeks:  The patient will demonstrate 3 /5 strength for right shoulder flexion, abduction, external rotation, and internal rotation.    STATUS:  Not met: progressing   TREATMENT: Manual therapy, therapeutic exercise, home exercise instruction, and modalities as needed to include: electrical stimulation, ultrasound, moist heat, and ice.     3. Carrying, Moving, and Handling Objects Functional Limitation     LTG 3: 12 weeks:  The patient will demonstrate 1-19 % limitation by achieving a score of 19 on the QuickDASH.    STATUS:  Met   STG 3a: 8 weeks:  The patient will demonstrate 20-39 % limitation by achieving a score of 27 on the QuickDASH.      STATUS:  Met   TREATMENT:  Manual therapy, therapeutic exercise, home exercise instruction, and modalities as needed to include: moist heat, electrical stimulation, and ultrasound.      Progress toward previous goals: Partially Met      Recommendations: Continue as planned  Timeframe: 1 month  Prognosis to achieve goals: fair    OT SIGNATURE:  Mo Hall, OT   DATE TREATMENT INITIATED: 11/1/2021  [place] License: [id number]  Certification Period: 11/1/2021 - 1/29/2022        Based upon review of the patient's progress and continued therapy plan, it is my medical opinion that Sabi Haynes should continue physical therapy treatment at [facility]  [address] [place] [address] [place] [address] [id number] [phone].    Signature: __________________________________  Fuad Squires MD    Timed:  Manual Therapy:    13     mins  55241;  Therapeutic Exercise:    10     mins  48213;     Neuromuscular Karen:    0    mins  26929;    Therapeutic Activity:     0     mins  92175;     Ultrasound:     0     mins  01919;    Electrical Stimulation:    0     mins  35598 ( );    Untimed:  Electrical Stimulation:    0     mins  40106 ( );  Fluidotherapy     0     mins  14379  Hot/cold pack     0     mins  31086    Timed Treatment:   23   mins   Total Treatment:     23   mins  Please sign and return via fax to [phone]  . Thank you, [facility] Occupational Therapy.

## 2021-11-03 ENCOUNTER — OFFICE VISIT (OUTPATIENT)
Dept: ORTHOPEDIC SURGERY | Facility: CLINIC | Age: 81
End: 2021-11-03

## 2021-11-03 VITALS — HEIGHT: 66 IN | OXYGEN SATURATION: 97 % | WEIGHT: 181.4 LBS | BODY MASS INDEX: 29.15 KG/M2 | HEART RATE: 100 BPM

## 2021-11-03 DIAGNOSIS — Z98.890 S/P ROTATOR CUFF SURGERY: Primary | ICD-10-CM

## 2021-11-03 PROCEDURE — 99212 OFFICE O/P EST SF 10 MIN: CPT | Performed by: PHYSICIAN ASSISTANT

## 2021-11-03 NOTE — PROGRESS NOTES
"Chief Complaint  Pain of the Right Shoulder    Subjective          Sabi Haynes presents to [facility] for follow-up of right shoulder pain, right shoulder status post arthroscopy with rotator cuff repair, AMANDO and PATIENCE performed with Dr. Quintero on 06/17/21.  Patient states that she will have MRI of her right shoulder as prescribed by her PCP on the eighth of this month.  She states she has history of lipomas.  We recently ordered Doppler of her upper extremity, which was unremarkable.  Patient states she still having pain along the upper extremity.  She also reports having pain along her scapula.  She is still attending physical therapy twice weekly.    Objective   Allergies   Allergen Reactions   • Atorvastatin Other (See Comments)     BONE PAIN   • Aspirin Other (See Comments), GI Intolerance and Unknown - Low Severity     \"MAKES ME LIGHTHEADED\"   • Ibuprofen Other (See Comments)     HISTORY OF GI ULCERS   • Morphine Other (See Comments)     \"HARD TIME WAKING UP\"   • Atenolol Other (See Comments)     sleepy   • Digoxin Other (See Comments)     \"MAKES ME FALL ASLEEP\"   • Latex Rash   • Meperidine Other (See Comments)     \"HARD TIME WAKING UP\"   • Metformin Diarrhea       Vital Signs:   Pulse 100   Ht 167.6 cm (66\")   Wt 82.3 kg (181 lb 6.4 oz)   SpO2 97%   BMI 29.28 kg/m²       Physical Exam  Constitutional:       Appearance: Normal appearance. Patient is well-developed and normal weight.   HENT:      Head: Normocephalic.      Right Ear: Hearing and external ear normal.      Left Ear: Hearing and external ear normal.      Nose: Nose normal.   Eyes:      Conjunctiva/sclera: Conjunctivae normal.   Cardiovascular:      Rate and Rhythm: Normal rate.   Pulmonary:      Effort: Pulmonary effort is normal.      Breath sounds: No wheezing or rales.   Abdominal:      Palpations: Abdomen is soft.      Tenderness: There is no abdominal tenderness.   Musculoskeletal:      Cervical back: Normal " range of motion.   Skin:     Findings: No rash.   Neurological:      Mental Status: Patient is alert and oriented to person, place, and time.   Psychiatric:         Mood and Affect: Mood and affect normal.         Judgment: Judgment normal.     Ortho Exam  Right shoulder: Tenderness along mid humerus.  Well-healed surgical incisions.  Tenderness along inferior medial border of the scapula.  Mild swelling mid humerus.  Sensation is intact, neurovascular intact, radial and ulnar pulse are 2+.  Active forward flexion is 85 degrees, active abduction to 75 degrees.  Internal rotation back pocket.  Good muscle tone of deltoid, biceps and triceps muscles.  Full motion of the elbow.  Full motion of the wrist.  Good muscle tone of wrist flexors and extensors.  Able to make a fist with good  strength.  Negative Spurling test.    Result Review :   The following data was reviewed by: ARNULFO Ta on 11/03/2021:         Imaging Results (Most Recent)     None                Assessment and Plan    Problem List Items Addressed This Visit        Musculoskeletal and Injuries    S/P rotator cuff surgery - Primary          Follow Up   No follow-ups on file.  Patient Instructions   Continue physical therapy. Continue progressing ROM as tolerable.    Patient will be obtaining MRI imaging of her shoulder, ordered by PCP, on 11/08. She will be following up with PCP.     We will reevaluate in 8-weeks.     Patient was given instructions and counseling regarding her condition or for health maintenance advice. Please see specific information pulled into the AVS if appropriate.

## 2021-11-03 NOTE — PATIENT INSTRUCTIONS
Continue physical therapy. Continue progressing ROM as tolerable.    Patient will be obtaining MRI imaging of her shoulder, ordered by PCP, on 11/08. She will be following up with PCP.     We will reevaluate in 8-weeks.

## 2021-11-08 ENCOUNTER — HOSPITAL ENCOUNTER (OUTPATIENT)
Dept: MRI IMAGING | Facility: HOSPITAL | Age: 81
Discharge: HOME | End: 2021-11-08
Admitting: FAMILY MEDICINE

## 2021-11-08 DIAGNOSIS — M79.621 PAIN IN RIGHT UPPER ARM: ICD-10-CM

## 2021-11-08 PROCEDURE — 73218 MRI UPPER EXTREMITY W/O DYE: CPT

## 2021-11-15 ENCOUNTER — TREATMENT (OUTPATIENT)
Dept: PHYSICAL THERAPY | Facility: CLINIC | Age: 81
End: 2021-11-15

## 2021-11-15 DIAGNOSIS — Z98.890 STATUS POST SUBACROMIAL DECOMPRESSION: ICD-10-CM

## 2021-11-15 DIAGNOSIS — Z47.89 AFTERCARE FOLLOWING SURGERY OF THE MUSCULOSKELETAL SYSTEM: Primary | ICD-10-CM

## 2021-11-15 DIAGNOSIS — Z98.890 S/P RIGHT ROTATOR CUFF REPAIR: ICD-10-CM

## 2021-11-15 PROCEDURE — 97110 THERAPEUTIC EXERCISES: CPT | Performed by: OCCUPATIONAL THERAPIST

## 2021-11-15 PROCEDURE — 97140 MANUAL THERAPY 1/> REGIONS: CPT | Performed by: OCCUPATIONAL THERAPIST

## 2021-11-15 NOTE — PROGRESS NOTES
" Occupational Therapy Daily Progress Note        Patient: Sabi Haynes   : 1940  Diagnosis/ICD-10 Code:  Aftercare following surgery of the musculoskeletal system [Z47.89]  Referring practitioner: Fuad Squires MD  Date of Initial Visit: Type: THERAPY  Noted: 2021  Today's Date: 11/15/2021  Patient seen for 30 sessions             Subjective Evaluation    Pain  Current pain ratin         Sabi Haynes reports: \"It's doing good just a little sore\"    Objective     See Exercise, Manual, and Modality Logs for complete treatment.       Assessment/Plan    Progress per Plan of Care           Timed:  Manual Therapy:    10     mins  07043;  Therapeutic Exercise:    13     mins  42485;     Neuromuscular Karen:    0    mins  87358;    Therapeutic Activity:     0     mins  03996;     Ultrasound:     0     mins  53234;    Electrical Stimulation:    0     mins  86873;    Untimed:  Electrical Stimulation:    0     mins  09019 ( );  Fluidotherapy     0     mins  74951  Hot/cold pack     0     mins  98734    Timed Treatment:   23   mins   Total Treatment:     23   mins        Mo Hall OT  Occupational Therapist  [place] License: [id number]  NPI: [id number]  "

## 2021-11-16 ENCOUNTER — OFFICE VISIT (OUTPATIENT)
Dept: FAMILY MEDICINE CLINIC | Facility: CLINIC | Age: 81
End: 2021-11-16

## 2021-11-16 VITALS
SYSTOLIC BLOOD PRESSURE: 118 MMHG | WEIGHT: 180.6 LBS | TEMPERATURE: 97.4 F | OXYGEN SATURATION: 99 % | RESPIRATION RATE: 14 BRPM | BODY MASS INDEX: 29.02 KG/M2 | HEIGHT: 66 IN | DIASTOLIC BLOOD PRESSURE: 72 MMHG | HEART RATE: 99 BPM

## 2021-11-16 DIAGNOSIS — E11.65 TYPE 2 DIABETES MELLITUS WITH HYPERGLYCEMIA, WITH LONG-TERM CURRENT USE OF INSULIN (HCC): ICD-10-CM

## 2021-11-16 DIAGNOSIS — I10 PRIMARY HYPERTENSION: ICD-10-CM

## 2021-11-16 DIAGNOSIS — B35.4 TINEA CORPORIS: Primary | ICD-10-CM

## 2021-11-16 DIAGNOSIS — Z79.4 TYPE 2 DIABETES MELLITUS WITH HYPERGLYCEMIA, WITH LONG-TERM CURRENT USE OF INSULIN (HCC): ICD-10-CM

## 2021-11-16 DIAGNOSIS — M25.511 RIGHT SHOULDER PAIN, UNSPECIFIED CHRONICITY: ICD-10-CM

## 2021-11-16 DIAGNOSIS — Z98.890 S/P ROTATOR CUFF SURGERY: ICD-10-CM

## 2021-11-16 PROCEDURE — 99214 OFFICE O/P EST MOD 30 MIN: CPT | Performed by: FAMILY MEDICINE

## 2021-11-16 RX ORDER — LEVOTHYROXINE SODIUM 112 UG/1
112 TABLET ORAL DAILY
Qty: 90 TABLET | Refills: 3 | Status: SHIPPED | OUTPATIENT
Start: 2021-11-16 | End: 2022-12-02 | Stop reason: SDUPTHER

## 2021-11-16 RX ORDER — KETOCONAZOLE 20 MG/G
1 CREAM TOPICAL DAILY
Qty: 30 G | Refills: 0 | Status: SHIPPED | OUTPATIENT
Start: 2021-11-16 | End: 2021-11-16

## 2021-11-16 RX ORDER — KETOCONAZOLE 20 MG/G
1 CREAM TOPICAL DAILY
Qty: 30 G | Refills: 0 | Status: SHIPPED | OUTPATIENT
Start: 2021-11-16 | End: 2021-11-30

## 2021-11-16 RX ORDER — INSULIN GLARGINE 100 [IU]/ML
50 INJECTION, SOLUTION SUBCUTANEOUS EVERY EVENING
Qty: 15 PEN | Refills: 3 | Status: SHIPPED | OUTPATIENT
Start: 2021-11-16 | End: 2022-09-12 | Stop reason: SDUPTHER

## 2021-11-17 ENCOUNTER — TREATMENT (OUTPATIENT)
Dept: PHYSICAL THERAPY | Facility: CLINIC | Age: 81
End: 2021-11-17

## 2021-11-17 DIAGNOSIS — Z98.890 STATUS POST SUBACROMIAL DECOMPRESSION: ICD-10-CM

## 2021-11-17 DIAGNOSIS — Z47.89 AFTERCARE FOLLOWING SURGERY OF THE MUSCULOSKELETAL SYSTEM: Primary | ICD-10-CM

## 2021-11-17 DIAGNOSIS — Z98.890 S/P RIGHT ROTATOR CUFF REPAIR: ICD-10-CM

## 2021-11-17 PROCEDURE — 97140 MANUAL THERAPY 1/> REGIONS: CPT | Performed by: OCCUPATIONAL THERAPIST

## 2021-11-17 PROCEDURE — 97110 THERAPEUTIC EXERCISES: CPT | Performed by: OCCUPATIONAL THERAPIST

## 2021-11-30 ENCOUNTER — TELEPHONE (OUTPATIENT)
Dept: FAMILY MEDICINE CLINIC | Facility: CLINIC | Age: 81
End: 2021-11-30

## 2021-11-30 NOTE — TELEPHONE ENCOUNTER
Caller: Sabi Haynes    Relationship: Self    Best call back number: [phone]    What is the best time to reach you: ANYTIME    Who are you requesting to speak with (clinical staff, provider,  specific staff member): NURSE     What was the call regarding: PATIENT IS NEEDING TO BE SEEN AS SOON AS POSSIBLE SHE HAS A FUNGUS ON HER HANDS. AND NAILS.   HANDS ARE PEELING, SHE IS VERY UPSET OVER THIS AND WORRIED ABOUT WHAT IS GOING ON.     Do you require a callback: YES

## 2021-12-03 ENCOUNTER — TREATMENT (OUTPATIENT)
Dept: PHYSICAL THERAPY | Facility: CLINIC | Age: 81
End: 2021-12-03

## 2021-12-03 DIAGNOSIS — Z98.890 S/P RIGHT ROTATOR CUFF REPAIR: ICD-10-CM

## 2021-12-03 DIAGNOSIS — Z98.890 STATUS POST SUBACROMIAL DECOMPRESSION: ICD-10-CM

## 2021-12-03 DIAGNOSIS — Z47.89 AFTERCARE FOLLOWING SURGERY OF THE MUSCULOSKELETAL SYSTEM: Primary | ICD-10-CM

## 2021-12-03 PROCEDURE — 97140 MANUAL THERAPY 1/> REGIONS: CPT | Performed by: OCCUPATIONAL THERAPIST

## 2021-12-03 PROCEDURE — 97110 THERAPEUTIC EXERCISES: CPT | Performed by: OCCUPATIONAL THERAPIST

## 2021-12-03 NOTE — PROGRESS NOTES
Patient: Sabi Haynes   : 1940  Diagnosis/ICD-10 Code:  Aftercare following surgery of the musculoskeletal system [Z47.89]  Referring practitioner: Fuad Squires MD  Date of Initial Visit: Type: THERAPY  Noted: 2021  Today's Date: 12/3/2021  Patient seen for 32 sessions      Subjective:   Subjective Questionnaire: QuickDASH:   Clinical Progress: improved  Home Program Compliance: Yes  Treatment has included: therapeutic exercise, manual therapy, moist heat and cryotherapy    Subjective Evaluation    Pain  Current pain ratin         Objective          Neurological Testing     Additional Neurological Details  WNL B shoulders.    Active Range of Motion     Right Shoulder   Flexion: 105 degrees   Abduction: 95 degrees   External rotation 0°: 45 degrees     Left Elbow   Normal active range of motion    Right Elbow   Normal active range of motion    Left Wrist   Normal active range of motion    Right Wrist   Normal active range of motion    Additional Active Range of Motion Details  AROM measured standing.  Pt able to make full, composite fist.    Passive Range of Motion     Right Shoulder   Flexion: 140 degrees   Abduction: WFL  External rotation 90°: 53 degrees   Internal rotation 90°: 50 degrees       Assessment & Plan     Assessment  Impairments: abnormal or restricted ROM, activity intolerance, impaired physical strength, lacks appropriate home exercise program and pain with function  Functional Limitations: carrying objects, lifting, sleeping, pulling, pushing, moving in bed, reaching behind back and reaching overhead  Assessment details: Pt has improved slightly with AROM. Pt reports pain in inferior angle of scapula and middle of upper arm as biggest barriers to therapy. Discussed current HEP with pt. OT encouraged pt to resume isometric HEP.  Prognosis: fair    Goals  Plan Goals: SHOULDER  PROBLEMS:     1. The patient has limited ROM of the right shoulder.    LTG 1: 12 weeks:  The  patient will demonstrate 140 degrees of right shoulder flexion, 140 degrees of shoulder abduction, 60 degrees of shoulder external rotation, and 60 degrees of shoulder internal rotation to allow the patient to reach into upper kitchen cabinets and manipulate clothing behind the back with greater ease.    STATUS:  Not met: progressing   STG 1a: 8 weeks:  The patient will demonstrate 140 degrees of passive right shoulder flexion, 140 degrees of passive right shoulder abduction, 60 degrees of passive right shoulder external rotation, and 60 degrees of passive right shoulder internal rotation.    STATUS:  Not met: progressing   TREATMENT: Manual therapy, therapeutic exercise, home exercise instruction, and modalities as needed to include: electrical stimulation, ultrasound, moist heat, and ice.    2. The patient has limited strength of the right shoulder.   LTG 2: 12 weeks:  The patient will demonstrate 4 /5 strength for right shoulder flexion, abduction, external rotation, and internal rotation in order to demonstrate improved shoulder stability.    STATUS:  Not met: progressing   STG 2a: 8 weeks:  The patient will demonstrate 3 /5 strength for right shoulder flexion, abduction, external rotation, and internal rotation.    STATUS:  Not met: progressing   TREATMENT: Manual therapy, therapeutic exercise, home exercise instruction, and modalities as needed to include: electrical stimulation, ultrasound, moist heat, and ice.     3. Carrying, Moving, and Handling Objects Functional Limitation     LTG 3: 12 weeks:  The patient will demonstrate 1-19 % limitation by achieving a score of 19 on the QuickDASH.    STATUS:  Met   STG 3a: 8 weeks:  The patient will demonstrate 20-39 % limitation by achieving a score of 27 on the QuickDASH.      STATUS:  Met   TREATMENT:  Manual therapy, therapeutic exercise, home exercise instruction, and modalities as needed to include: moist heat, electrical stimulation, and  ultrasound.      Progress toward previous goals: Partially Met      Recommendations: Continue as planned  Timeframe: 1 month  Prognosis to achieve goals: fair    OT SIGNATURE: Mo RODRÍGUEZ Hall   DATE TREATMENT INITIATED: 12/3/2021  [place] License: [id number]  NPI: [id number]  Certification Period: 12/3/2021 - 3/2/2022        Based upon review of the patient's progress and continued therapy plan, it is my medical opinion that Sabi Haynes should continue physical therapy treatment at [facility]  [address] [place] [id number] [phone].    Signature: __________________________________  Fuad Squires MD    Timed:  Manual Therapy:    15     mins  66785;  Therapeutic Exercise:    8     mins  15756;     Neuromuscular Karen:    0    mins  88769;    Therapeutic Activity:     0     mins  26204;     Ultrasound:     0     mins  33382;    Electrical Stimulation:    0     mins  20786 ( );    Untimed:  Electrical Stimulation:    0     mins  49935 ( );  Fluidotherapy     0     mins  20251  Hot/cold pack     0     mins  27937    Timed Treatment:   23   mins   Total Treatment:     23   mins  Please sign and return via fax to [phone]  . Thank you, [facility] Occupational Therapy.

## 2021-12-06 ENCOUNTER — TREATMENT (OUTPATIENT)
Dept: PHYSICAL THERAPY | Facility: CLINIC | Age: 81
End: 2021-12-06

## 2021-12-06 DIAGNOSIS — Z98.890 STATUS POST SUBACROMIAL DECOMPRESSION: ICD-10-CM

## 2021-12-06 DIAGNOSIS — Z98.890 S/P RIGHT ROTATOR CUFF REPAIR: ICD-10-CM

## 2021-12-06 DIAGNOSIS — Z47.89 AFTERCARE FOLLOWING SURGERY OF THE MUSCULOSKELETAL SYSTEM: Primary | ICD-10-CM

## 2021-12-06 PROCEDURE — 97140 MANUAL THERAPY 1/> REGIONS: CPT | Performed by: OCCUPATIONAL THERAPIST

## 2021-12-06 PROCEDURE — 97110 THERAPEUTIC EXERCISES: CPT | Performed by: OCCUPATIONAL THERAPIST

## 2021-12-06 NOTE — PROGRESS NOTES
" Occupational Therapy Daily Progress Note        Patient: Sabi Haynes   : 1940  Diagnosis/ICD-10 Code:  Aftercare following surgery of the musculoskeletal system [Z47.89]  Referring practitioner: Fuad Squires MD  Date of Initial Visit: Type: THERAPY  Noted: 2021  Today's Date: 2021  Patient seen for 33 sessions             Subjective   Sabi Haynes reports: \"I don't have any pain. I was able to do a lot of cleaning on Saturday\"    Objective     See Exercise, Manual, and Modality Logs for complete treatment.       Assessment/Plan  Pt reports not attempting isometrics yet. OT encouraged pt to complete for increased strength/function of right UE.  Progress per Plan of Care           Timed:  Manual Therapy:    15     mins  18793;  Therapeutic Exercise:    15     mins  13878;     Neuromuscular Karen:    0    mins  04493;    Therapeutic Activity:     0     mins  11310;     Ultrasound:     0     mins  05517;    Electrical Stimulation:    0     mins  01319;    Untimed:  Electrical Stimulation:    0     mins  14001 ( );  Fluidotherapy     0     mins  21087  Hot/cold pack     0     mins  15331    Timed Treatment:   30   mins   Total Treatment:     30   mins        Mo Hall OT  Occupational Therapist  [place] License: [id number]  NPI: [id number]  "

## 2021-12-17 ENCOUNTER — TREATMENT (OUTPATIENT)
Dept: PHYSICAL THERAPY | Facility: CLINIC | Age: 81
End: 2021-12-17

## 2021-12-17 DIAGNOSIS — Z98.890 STATUS POST SUBACROMIAL DECOMPRESSION: ICD-10-CM

## 2021-12-17 DIAGNOSIS — Z98.890 S/P RIGHT ROTATOR CUFF REPAIR: ICD-10-CM

## 2021-12-17 DIAGNOSIS — Z47.89 AFTERCARE FOLLOWING SURGERY OF THE MUSCULOSKELETAL SYSTEM: Primary | ICD-10-CM

## 2021-12-17 PROCEDURE — 97140 MANUAL THERAPY 1/> REGIONS: CPT | Performed by: OCCUPATIONAL THERAPIST

## 2021-12-17 PROCEDURE — 97110 THERAPEUTIC EXERCISES: CPT | Performed by: OCCUPATIONAL THERAPIST

## 2021-12-17 NOTE — PROGRESS NOTES
" Occupational Therapy Daily Progress Note        Patient: Sabi Haynes   : 1940  Diagnosis/ICD-10 Code:  Aftercare following surgery of the musculoskeletal system [Z47.89]  Referring practitioner: Fuad Squires MD  Date of Initial Visit: Type: THERAPY  Noted: 2021  Today's Date: 2021  Patient seen for 34 sessions             Subjective Evaluation    Pain  Current pain ratin         Sabi Haynes reports: \"It's feeling good\"    Objective     See Exercise, Manual, and Modality Logs for complete treatment.       Assessment/Plan  Provided IR stretch behind back and across chest. Pt tolerated well stating \"there's no pain with this one\"  Progress per Plan of Care           Timed:  Manual Therapy:    13     mins  53843;  Therapeutic Exercise:    10     mins  82321;     Neuromuscular Karen:    0    mins  73381;    Therapeutic Activity:     0     mins  88260;     Ultrasound:     0     mins  38674;    Electrical Stimulation:    0     mins  06115;    Untimed:  Electrical Stimulation:    0     mins  78067 (MC );  Fluidotherapy     0     mins  76165  Hot/cold pack     0     mins  40306    Timed Treatment:   23   mins   Total Treatment:     23   mins        Mo Hall OT  Occupational Therapist  [place] License: [id number]  NPI: [id number]  "

## 2021-12-20 ENCOUNTER — TREATMENT (OUTPATIENT)
Dept: PHYSICAL THERAPY | Facility: CLINIC | Age: 81
End: 2021-12-20

## 2021-12-20 DIAGNOSIS — Z98.890 S/P RIGHT ROTATOR CUFF REPAIR: ICD-10-CM

## 2021-12-20 DIAGNOSIS — Z47.89 AFTERCARE FOLLOWING SURGERY OF THE MUSCULOSKELETAL SYSTEM: Primary | ICD-10-CM

## 2021-12-20 DIAGNOSIS — Z98.890 STATUS POST SUBACROMIAL DECOMPRESSION: ICD-10-CM

## 2021-12-20 PROCEDURE — 97140 MANUAL THERAPY 1/> REGIONS: CPT | Performed by: OCCUPATIONAL THERAPIST

## 2021-12-20 PROCEDURE — 97110 THERAPEUTIC EXERCISES: CPT | Performed by: OCCUPATIONAL THERAPIST

## 2021-12-20 NOTE — PROGRESS NOTES
" Occupational Therapy Daily Progress Note        Patient: Sabi Haynes   : 1940  Diagnosis/ICD-10 Code:  Aftercare following surgery of the musculoskeletal system [Z47.89]  Referring practitioner: Fuad Squires MD  Date of Initial Visit: Type: THERAPY  Noted: 2021  Today's Date: 2021  Patient seen for 35 sessions             Subjective   Sabi Haynes reports: \"I'm able to sleep on this side now.\"    Objective          Neurological Testing     Additional Neurological Details  WNL B shoulders.    Active Range of Motion     Right Shoulder   Flexion: 120 degrees   Abduction: 115 degrees   External rotation 0°: 50 degrees     Left Elbow   Normal active range of motion    Right Elbow   Normal active range of motion    Left Wrist   Normal active range of motion    Right Wrist   Normal active range of motion    Additional Active Range of Motion Details  AROM measured standing.  Pt able to make full, composite fist.    Passive Range of Motion     Right Shoulder   Flexion: 140 degrees   Abduction: WFL  External rotation 90°: 60 degrees   Internal rotation 90°: 50 degrees     Strength/Myotome Testing     Right Shoulder     Planes of Motion   Flexion: 2+   Extension: 4   Abduction: 2+   Adduction: 4   Horizontal abduction: 4   Horizontal adduction: 4+         See Exercise, Manual, and Modality Logs for complete treatment.       Assessment & Plan     Assessment  Impairments: abnormal or restricted ROM, activity intolerance, impaired physical strength, lacks appropriate home exercise program and pain with function  Functional Limitations: carrying objects, lifting, sleeping, pulling, pushing, moving in bed, reaching behind back and reaching overhead  Assessment details: Pt has improved slightly with AROM. Pt reports pain in inferior angle of scapula and middle of upper arm as biggest barriers to therapy. Pt met 1/3 LTGs and 1/3 STGs.  Prognosis: fair    Goals  Plan Goals: SHOULDER  PROBLEMS:     1. " The patient has limited ROM of the right shoulder.    LTG 1: 12 weeks:  The patient will demonstrate 140 degrees of right shoulder flexion, 140 degrees of shoulder abduction, 60 degrees of shoulder external rotation, and 60 degrees of shoulder internal rotation to allow the patient to reach into upper kitchen cabinets and manipulate clothing behind the back with greater ease.    STATUS:  Not met   STG 1a: 8 weeks:  The patient will demonstrate 140 degrees of passive right shoulder flexion, 140 degrees of passive right shoulder abduction, 60 degrees of passive right shoulder external rotation, and 60 degrees of passive right shoulder internal rotation.    STATUS:  Not met   TREATMENT: Manual therapy, therapeutic exercise, home exercise instruction, and modalities as needed to include: electrical stimulation, ultrasound, moist heat, and ice.    2. The patient has limited strength of the right shoulder.   LTG 2: 12 weeks:  The patient will demonstrate 4 /5 strength for right shoulder flexion, abduction, external rotation, and internal rotation in order to demonstrate improved shoulder stability.    STATUS:  Not met   STG 2a: 8 weeks:  The patient will demonstrate 3 /5 strength for right shoulder flexion, abduction, external rotation, and internal rotation.    STATUS:  Not met   TREATMENT: Manual therapy, therapeutic exercise, home exercise instruction, and modalities as needed to include: electrical stimulation, ultrasound, moist heat, and ice.     3. Carrying, Moving, and Handling Objects Functional Limitation     LTG 3: 12 weeks:  The patient will demonstrate 1-19 % limitation by achieving a score of 19 on the QuickDASH.    STATUS:  Met   STG 3a: 8 weeks:  The patient will demonstrate 20-39 % limitation by achieving a score of 27 on the QuickDASH.      STATUS:  Met   TREATMENT:  Manual therapy, therapeutic exercise, home exercise instruction, and modalities as needed to include: moist heat, electrical stimulation,  "and ultrasound.      OT discussed HEP provided up to this point. Pt reports completing isometrics \"a little\" but that it causes her arm to swell. Pt demonstrated incorrect cross body posterior capsule stretch. OT re-educated pt on proper technique and demonstrated to pt. Teachback successful. OT added shoulder Tband 6 way HEP. Teachback successful.   Other: Discharge to [facility] at this time.           Timed:  Manual Therapy:    10     mins  95118;  Therapeutic Exercise:    13     mins  03930;     Neuromuscular Karen:    0    mins  56904;    Therapeutic Activity:     0     mins  79822;     Ultrasound:     0     mins  94603;    Electrical Stimulation:    0     mins  66653;    Untimed:  Electrical Stimulation:    0     mins  61065 ( );  Fluidotherapy     0     mins  70134  Hot/cold pack     0     mins  94424    Timed Treatment:   23   mins   Total Treatment:     23   mins        Mo Hall OT  Occupational Therapist  [place] License: [id number]  NPI: [id number]  "

## 2021-12-29 ENCOUNTER — OFFICE VISIT (OUTPATIENT)
Dept: ORTHOPEDIC SURGERY | Facility: CLINIC | Age: 81
End: 2021-12-29

## 2021-12-29 VITALS — HEIGHT: 66 IN | OXYGEN SATURATION: 98 % | HEART RATE: 90 BPM | BODY MASS INDEX: 28.57 KG/M2 | WEIGHT: 177.8 LBS

## 2021-12-29 DIAGNOSIS — Z47.89 AFTERCARE FOLLOWING SURGERY OF THE MUSCULOSKELETAL SYSTEM: Primary | ICD-10-CM

## 2021-12-29 PROCEDURE — 99213 OFFICE O/P EST LOW 20 MIN: CPT | Performed by: PHYSICIAN ASSISTANT

## 2021-12-29 RX ORDER — BLOOD-GLUCOSE METER
KIT MISCELLANEOUS
COMMUNITY
Start: 2021-08-20

## 2021-12-29 RX ORDER — CONJUGATED ESTROGENS 0.62 MG/G
CREAM VAGINAL
COMMUNITY
Start: 2021-10-27 | End: 2022-12-02 | Stop reason: SDUPTHER

## 2021-12-29 RX ORDER — LANCETS 28 GAUGE
EACH MISCELLANEOUS
COMMUNITY
Start: 2021-08-20 | End: 2022-06-10 | Stop reason: SDUPTHER

## 2021-12-29 RX ORDER — ISOPROPYL ALCOHOL 70 ML/100ML
SWAB TOPICAL
COMMUNITY
Start: 2021-12-02 | End: 2022-06-10 | Stop reason: SDUPTHER

## 2021-12-29 NOTE — PATIENT INSTRUCTIONS
Patient has completed PT. Plans to continue home exercises. She is doing well and is happy with her results. Follow-up as needed.

## 2021-12-29 NOTE — PROGRESS NOTES
"Chief Complaint  Pain and Follow-up of the Right Shoulder    Subjective          Sabi Haynes presents to [facility] for follow-up on right shoulder status post right shoulder scope with RCR, SCD and DCR performed by Dr. Quintero 6/17/2021. Patient states she is very happy with her progress that she is made. She has completed physical therapy. Patient has a history of lipomas and had an MRI of the right humerus ordered by her PCP that showed no acute soft tissue abnormalities. Patient states she feels lumps and bumps in the right arm that are painful, especially with exercise. She denies taking any pain medications because she does not like doing that. She states pain aches down the shoulder. Overall pain within the shoulder joint is tolerable and improving day by day.    Objective   Allergies   Allergen Reactions   • Atorvastatin Other (See Comments)     BONE PAIN  Other reaction(s): Other (See Comments)  BONE PAIN   • Aspirin Other (See Comments), GI Intolerance and Unknown - Low Severity     \"MAKES ME LIGHTHEADED\"  Other reaction(s): GI Intolerance, Other (See Comments), Unknown - Low Severity  States she can only take asa 81mg. Cannot take \"antionette\" asa  \"MAKES ME LIGHTHEADED\"   • Ibuprofen Other (See Comments)     HISTORY OF GI ULCERS  Other reaction(s): Other (See Comments)  HISTORY OF GI ULCERS   • Morphine Other (See Comments)     \"HARD TIME WAKING UP\"  Other reaction(s): Other (See Comments)  \"HARD TIME WAKING UP\"   • Atenolol Other (See Comments)     sleepy  Other reaction(s): Other (See Comments)  sleepy   • Digoxin Other (See Comments)     \"MAKES ME FALL ASLEEP\"  Other reaction(s): Other (See Comments)  \"MAKES ME FALL ASLEEP\"   • Latex Rash   • Meperidine Other (See Comments)     \"HARD TIME WAKING UP\"  Other reaction(s): Other (See Comments)  \"HARD TIME WAKING UP\"   • Metformin Diarrhea       Vital Signs:   Pulse 90   Ht 167.6 cm (66\")   Wt 80.6 kg (177 lb 12.8 oz)   SpO2 " 98%   BMI 28.70 kg/m²       Physical Exam  Constitutional:       Appearance: Normal appearance. Patient is well-developed and normal weight.   HENT:      Head: Normocephalic.      Right Ear: Hearing and external ear normal.      Left Ear: Hearing and external ear normal.      Nose: Nose normal.   Eyes:      Conjunctiva/sclera: Conjunctivae normal.   Cardiovascular:      Rate and Rhythm: Normal rate.   Pulmonary:      Effort: Pulmonary effort is normal.      Breath sounds: No wheezing or rales.   Abdominal:      Palpations: Abdomen is soft.      Tenderness: There is no abdominal tenderness.   Musculoskeletal:      Cervical back: Normal range of motion.   Skin:     Findings: No rash.   Neurological:      Mental Status: Patient is alert and oriented to person, place, and time.   Psychiatric:         Mood and Affect: Mood and affect normal.         Judgment: Judgment normal.     Ortho Exam  Right shoulder: Skin intact, well-healed surgical incision without erythema dehiscence or purulent drainage, mild tenderness to palpation, no swelling, flexion 160 abduction 130 internal rotation L5 and external rotation to 25, good range of motion right elbow wrist and digits, sensation light touch intact and radial pulse 2+.  Result Review :            Imaging Results (Most Recent)     None      PROCEDURE:  MRI HUMERUS RIGHT WO CONTRAST     COMPARISON: None     INDICATIONS:  arm mass and pain     TECHNIQUE:    Multi signal multisequential MR images of the proximal to mid right humerus and   surrounding soft tissues were obtained.  Images were performed without contrast.  FINDINGS:          No significant abnormal soft tissue signal is observed. There is no evidence for abnormal focal   fluid collection or edema within the soft tissues. There is no evidence for significant hemorrhage   or hematoma.  No abnormal soft tissue mass is identified.       Postoperative changes are noted which suggest prior rotator cuff repair.  No  abnormal bone marrow   signal is otherwise seen. The cortical margins are otherwise intact. There is no evidence for   stress injury or stress reaction.  No marrow replacing lesions are seen.  There is no evidence for   periosteal reaction.       CONCLUSION:   1. Unremarkable noncontrast MRI of the right proximal to mid humerus.  No definitive soft tissue   mass is identified.                       Assessment and Plan    Problem List Items Addressed This Visit        Musculoskeletal and Injuries    Aftercare following right shoulder arthroscopy - Primary    Current Assessment & Plan     Patient has completed PT. Plans to continue home exercises. She is doing well and is happy with her results. Follow-up as needed.               Follow Up   Return if symptoms worsen or fail to improve.  Patient Instructions   Patient has completed PT. Plans to continue home exercises. She is doing well and is happy with her results. Follow-up as needed.    Patient was given instructions and counseling regarding her condition or for health maintenance advice. Please see specific information pulled into the AVS if appropriate.

## 2022-01-11 ENCOUNTER — OFFICE VISIT (OUTPATIENT)
Dept: FAMILY MEDICINE CLINIC | Facility: CLINIC | Age: 82
End: 2022-01-11

## 2022-01-11 VITALS
TEMPERATURE: 97.3 F | OXYGEN SATURATION: 99 % | DIASTOLIC BLOOD PRESSURE: 74 MMHG | HEART RATE: 80 BPM | HEIGHT: 66 IN | BODY MASS INDEX: 28.77 KG/M2 | SYSTOLIC BLOOD PRESSURE: 120 MMHG | WEIGHT: 179 LBS

## 2022-01-11 DIAGNOSIS — R39.9 URINARY SYMPTOM OR SIGN: Primary | ICD-10-CM

## 2022-01-11 DIAGNOSIS — R11.0 NAUSEA: ICD-10-CM

## 2022-01-11 LAB
BACTERIA UR QL AUTO: NORMAL /HPF
BILIRUB BLD-MCNC: NEGATIVE MG/DL
BILIRUB UR QL STRIP: NEGATIVE
CLARITY UR: CLEAR
CLARITY, POC: CLEAR
COLOR UR: YELLOW
COLOR UR: YELLOW
EXPIRATION DATE: NORMAL
GLUCOSE UR STRIP-MCNC: NEGATIVE MG/DL
GLUCOSE UR STRIP-MCNC: NEGATIVE MG/DL
HGB UR QL STRIP.AUTO: NEGATIVE
HYALINE CASTS UR QL AUTO: NORMAL /LPF
KETONES UR QL STRIP: NEGATIVE
KETONES UR QL: NEGATIVE
LEUKOCYTE EST, POC: NEGATIVE
LEUKOCYTE ESTERASE UR QL STRIP.AUTO: ABNORMAL
Lab: NORMAL
NITRITE UR QL STRIP: NEGATIVE
NITRITE UR-MCNC: NEGATIVE MG/ML
PH UR STRIP.AUTO: 6 [PH] (ref 5–8)
PH UR: 5.5 [PH] (ref 5–8)
PROT UR QL STRIP: NEGATIVE
PROT UR STRIP-MCNC: NEGATIVE MG/DL
RBC # UR STRIP: NEGATIVE /UL
RBC # UR STRIP: NORMAL /HPF
REF LAB TEST METHOD: NORMAL
SP GR UR STRIP: 1.02 (ref 1–1.03)
SP GR UR: 1.03 (ref 1–1.03)
SQUAMOUS #/AREA URNS HPF: NORMAL /HPF
UROBILINOGEN UR QL STRIP: ABNORMAL
UROBILINOGEN UR QL: NORMAL
WBC # UR STRIP: NORMAL /HPF

## 2022-01-11 PROCEDURE — 99213 OFFICE O/P EST LOW 20 MIN: CPT | Performed by: NURSE PRACTITIONER

## 2022-01-11 PROCEDURE — 81001 URINALYSIS AUTO W/SCOPE: CPT | Performed by: NURSE PRACTITIONER

## 2022-01-11 PROCEDURE — 81003 URINALYSIS AUTO W/O SCOPE: CPT | Performed by: NURSE PRACTITIONER

## 2022-01-11 RX ORDER — PHENAZOPYRIDINE HYDROCHLORIDE 200 MG/1
200 TABLET, FILM COATED ORAL 3 TIMES DAILY PRN
Qty: 6 TABLET | Refills: 0 | Status: SHIPPED | OUTPATIENT
Start: 2022-01-11 | End: 2022-01-13

## 2022-01-11 RX ORDER — ONDANSETRON 4 MG/1
4 TABLET, ORALLY DISINTEGRATING ORAL EVERY 6 HOURS PRN
Qty: 12 TABLET | Refills: 0 | Status: SHIPPED | OUTPATIENT
Start: 2022-01-11 | End: 2022-01-14

## 2022-01-11 NOTE — PROGRESS NOTES
"Chief Complaint  Urinary Tract Infection    Subjective          Sabi Haynes presents to [facility]  History of Present Illness   81-year-old female presents today for an acute visit, she is a patient of Dr. Villalpando.  She is complaining of UTI symptoms.  She states she self caths.  She states she has been taking some Macrobid that she had leftover until yesterday.  She has been having a dark-colored urine.  She states she drinks lots of water.  She denies any fever chills.  She states she has chronic back pain.  She is complaining of some nausea. She states she is taking the medication with food.  Urinalysis in office today is negative for leukocytes, negative blood, negative nitrate.  Objective   Vital Signs:   /74   Pulse 80   Temp 97.3 °F (36.3 °C)   Ht 167.6 cm (66\")   Wt 81.2 kg (179 lb)   SpO2 99%   BMI 28.89 kg/m²     Physical Exam  Vitals reviewed.   Constitutional:       Appearance: Normal appearance. She is well-developed.   Cardiovascular:      Rate and Rhythm: Normal rate and regular rhythm.      Heart sounds: No murmur heard.  No friction rub. No gallop.    Pulmonary:      Effort: Pulmonary effort is normal.      Breath sounds: Normal breath sounds. No wheezing or rhonchi.   Skin:     General: Skin is warm and dry.   Neurological:      Mental Status: She is alert and oriented to person, place, and time.      Cranial Nerves: No cranial nerve deficit.   Psychiatric:         Mood and Affect: Mood and affect normal.         Behavior: Behavior normal.         Thought Content: Thought content normal. Thought content does not include homicidal or suicidal ideation.         Judgment: Judgment normal.        Result Review :                 Assessment and Plan    Diagnoses and all orders for this visit:    1. Urinary symptom or sign (Primary)  Comments:  I discussed with patient that her urine is clear in office but will send to lab for confirmation.  Orders:  -     " POCT urinalysis dipstick, automated  -     Urinalysis With Culture If Indicated - Urine, Catheter In/Out  Increase fluid intake. If you develop fever, chills, N/V, flank pain, or worsening of your symptoms return to the office or go to the ER.  2. Nausea  Comments:  I will give her Zofran as needed for the nausea.    Other orders  -     ondansetron ODT (Zofran ODT) 4 MG disintegrating tablet; Place 1 tablet on the tongue Every 6 (Six) Hours As Needed for Nausea or Vomiting for up to 3 days.  Dispense: 12 tablet; Refill: 0  -     phenazopyridine (Pyridium) 200 MG tablet; Take 1 tablet by mouth 3 (Three) Times a Day As Needed for Bladder Spasms for up to 2 days.  Dispense: 6 tablet; Refill: 0        Follow Up   Return if symptoms worsen or fail to improve.  Patient was given instructions and counseling regarding her condition or for health maintenance advice. Please see specific information pulled into the AVS if appropriate.

## 2022-03-21 ENCOUNTER — OFFICE VISIT (OUTPATIENT)
Dept: ORTHOPEDIC SURGERY | Facility: CLINIC | Age: 82
End: 2022-03-21

## 2022-03-21 VITALS — HEIGHT: 66 IN | BODY MASS INDEX: 28.77 KG/M2 | WEIGHT: 179 LBS

## 2022-03-21 DIAGNOSIS — M25.562 PAIN IN BOTH KNEES, UNSPECIFIED CHRONICITY: Primary | ICD-10-CM

## 2022-03-21 DIAGNOSIS — M25.561 PAIN IN BOTH KNEES, UNSPECIFIED CHRONICITY: Primary | ICD-10-CM

## 2022-03-21 DIAGNOSIS — Z98.890 HISTORY OF SURGERY: ICD-10-CM

## 2022-03-21 PROCEDURE — 20610 DRAIN/INJ JOINT/BURSA W/O US: CPT | Performed by: ORTHOPAEDIC SURGERY

## 2022-03-21 RX ADMIN — TRIAMCINOLONE ACETONIDE 40 MG: 40 INJECTION, SUSPENSION INTRA-ARTICULAR; INTRAMUSCULAR at 15:27

## 2022-03-21 RX ADMIN — LIDOCAINE HYDROCHLORIDE 9 ML: 10 INJECTION, SOLUTION INFILTRATION; PERINEURAL at 15:27

## 2022-03-21 NOTE — PROGRESS NOTES
"Chief Complaint  Follow-up of the Right Knee and Follow-up of the Left Knee     Subjective      Sabi Haynes presents to [facility] for a follow-up of bilateral knees. Patient states it feels like her knees are burning and having electrical shocks through it. She is unsure if the pain is from the knee or her spine. She states pain along the medial joint line. Her left knee hurts more than the right. She has a history of a right knee scope. Her right knee had done well until she had fallen.     Allergies   Allergen Reactions   • Atorvastatin Other (See Comments)     BONE PAIN  Other reaction(s): Other (See Comments)  BONE PAIN   • Aspirin Other (See Comments), GI Intolerance and Unknown - Low Severity     \"MAKES ME LIGHTHEADED\"  Other reaction(s): GI Intolerance, Other (See Comments), Unknown - Low Severity  States she can only take asa 81mg. Cannot take \"antionette\" asa  \"MAKES ME LIGHTHEADED\"   • Ibuprofen Other (See Comments)     HISTORY OF GI ULCERS  Other reaction(s): Other (See Comments)  HISTORY OF GI ULCERS   • Morphine Other (See Comments)     \"HARD TIME WAKING UP\"  Other reaction(s): Other (See Comments)  \"HARD TIME WAKING UP\"   • Atenolol Other (See Comments)     sleepy  Other reaction(s): Other (See Comments)  sleepy   • Digoxin Other (See Comments)     \"MAKES ME FALL ASLEEP\"  Other reaction(s): Other (See Comments)  \"MAKES ME FALL ASLEEP\"   • Latex Rash   • Meperidine Other (See Comments)     \"HARD TIME WAKING UP\"  Other reaction(s): Other (See Comments)  \"HARD TIME WAKING UP\"   • Metformin Diarrhea        Social History     Socioeconomic History   • Marital status:    Tobacco Use   • Smoking status: Never Smoker   • Smokeless tobacco: Never Used   Vaping Use   • Vaping Use: Never used   Substance and Sexual Activity   • Alcohol use: No   • Drug use: No        Review of Systems     Objective   Vital Signs:   Ht 167.6 cm (66\")   Wt 81.2 kg (179 lb)   BMI 28.89 kg/m²   "     Physical Exam  Constitutional:       Appearance: Normal appearance. Patient is well-developed and normal weight.   HENT:      Head: Normocephalic.      Right Ear: Hearing and external ear normal.      Left Ear: Hearing and external ear normal.      Nose: Nose normal.   Eyes:      Conjunctiva/sclera: Conjunctivae normal.   Cardiovascular:      Rate and Rhythm: Normal rate.   Pulmonary:      Effort: Pulmonary effort is normal.      Breath sounds: No wheezing or rales.   Abdominal:      Palpations: Abdomen is soft.      Tenderness: There is no abdominal tenderness.   Musculoskeletal:      Cervical back: Normal range of motion.   Skin:     Findings: No rash.   Neurological:      Mental Status: Patient  is alert and oriented to person, place, and time.   Psychiatric:         Mood and Affect: Mood and affect normal.         Judgment: Judgment normal.       Ortho Exam      LEFT KNEE: tender medial joint line. Calf supple, non-tender, no signs of DVT. Dorsal Pedal Pulse 2+, posterior tibialis pulse 2+. Good strength to hamstrings, quadriceps, dorsiflexors and plantar flexors. Stable to varus/valgus stress. Stable anterior and posterior drawer. No swelling, skin discoloration or atrophy.     RIGHT KNEE: scars well healed. Calf supple, non-tender, no signs of DVT. Dorsal Pedal Pulse 2+, posterior tibialis pulse 2+. No swelling, skin discoloration or atrophy. Full extension and flexion. Non-antalgic gait. Stable to varus/valgus stress. Stable anterior and posterior drawer.       Large Joint Arthrocentesis: R knee  Date/Time: 3/21/2022 3:27 PM  Consent given by: patient  Site marked: site marked  Timeout: Immediately prior to procedure a time out was called to verify the correct patient, procedure, equipment, support staff and site/side marked as required   Supporting Documentation  Indications: pain   Procedure Details  Location: knee - R knee  Preparation: Patient was prepped and draped in the usual sterile  fashion  Needle size: 22 G  Medications administered: 40 mg triamcinolone acetonide 40 MG/ML; 9 mL lidocaine 1 %  Patient tolerance: patient tolerated the procedure well with no immediate complications    Large Joint Arthrocentesis: L knee  Date/Time: 3/21/2022 3:27 PM  Consent given by: patient  Site marked: site marked  Timeout: Immediately prior to procedure a time out was called to verify the correct patient, procedure, equipment, support staff and site/side marked as required   Supporting Documentation  Indications: pain   Procedure Details  Location: knee - L knee  Preparation: Patient was prepped and draped in the usual sterile fashion  Needle size: 22 G  Medications administered: 40 mg triamcinolone acetonide 40 MG/ML; 9 mL lidocaine 1 %  Patient tolerance: patient tolerated the procedure well with no immediate complications              Imaging Results (Most Recent)     Procedure Component Value Units Date/Time    XR Knee 3 View Bilateral [[id number]] Resulted: 03/21/22 1459     Updated: 03/21/22 1500           Result Review :     X-Ray Report:  Bilateral knee(s) X-Ray  Indication: Evaluation of bilateral knees   AP, Lateral and Standing view(s)  Findings: No advanced arthritis of bilateral knees. No acute fracture or dislocation.   Prior studies available for comparison: no     Assessment and Plan     DX: History of a right knee scope   Bilateral knee pain     Discussed treatment plans and diagnosis with the patient. Patient has 2 surgeries coming up for the spine and her kidneys. She will focus on this at this time. Knee pain will be considered for surgical intervention at a later time. Patient given bilateral knee steroid injections, she tolerated this well.     Call or return if worsening symptoms.    Follow Up     PRN.       Patient was given instructions and counseling regarding her condition or for health maintenance advice. Please see specific information pulled into the AVS if appropriate.      Scribed for Henri Quintero MD by Bethany Saenz.  03/21/22   15:14 EDT    I have personally performed the services described in this document as scribed by the above individual and it is both accurate and complete. Henri Quintero MD 03/22/22

## 2022-03-22 RX ORDER — TRIAMCINOLONE ACETONIDE 40 MG/ML
40 INJECTION, SUSPENSION INTRA-ARTICULAR; INTRAMUSCULAR
Status: COMPLETED | OUTPATIENT
Start: 2022-03-21 | End: 2022-03-21

## 2022-03-22 RX ORDER — LIDOCAINE HYDROCHLORIDE 10 MG/ML
9 INJECTION, SOLUTION INFILTRATION; PERINEURAL
Status: COMPLETED | OUTPATIENT
Start: 2022-03-21 | End: 2022-03-21

## 2022-06-10 NOTE — TELEPHONE ENCOUNTER
Caller: Sabi Haynes    Relationship: Self    Best call back number: [phone]     Requested Prescriptions:   Requested Prescriptions     Pending Prescriptions Disp Refills   • Alcohol Swabs (Easy Touch Alcohol Prep Medium) 70 % pads     • Lancets (freestyle) lancets          DROPLET PIN NEEDLES 31G X3 /16 INCH     Pharmacy where request should be sent: [facility] [place] [facility] [place] - [address] [facility] [address] [phone] [facility] [phone]      Additional details provided by patient: PATIENT CALLED STATING SHE IS NEEDING A FULL ORDER OF LABS TO BE ORDERED DUE TO HER HAVING SURGERY ON 06/20/2022 AND WOULD LIKE TO CHECK EVERYTHING TO MAKE SURE EVERYTHING IS OKAY. PATIENT WOULD LIKE A CALL BACK TO LET HER KNOW THESE HAVE BEEN ORDERED PLEASE ADVISE THANK YOU.     Does the patient have less than a 3 day supply:  [x] Yes  [] No    Suzanne Odonnell Rep   06/10/22 13:25 EDT

## 2022-06-13 RX ORDER — ISOPROPYL ALCOHOL 70 ML/100ML
1 SWAB TOPICAL 3 TIMES DAILY
Qty: 100 EACH | Refills: 5 | Status: SHIPPED | OUTPATIENT
Start: 2022-06-13

## 2022-06-13 RX ORDER — LANCETS 28 GAUGE
EACH MISCELLANEOUS
Qty: 100 EACH | Refills: 5 | Status: SHIPPED | OUTPATIENT
Start: 2022-06-13 | End: 2023-03-21 | Stop reason: SDUPTHER

## 2022-07-26 ENCOUNTER — TRANSCRIBE ORDERS (OUTPATIENT)
Dept: ADMINISTRATIVE | Facility: HOSPITAL | Age: 82
End: 2022-07-26

## 2022-07-26 ENCOUNTER — HOSPITAL ENCOUNTER (OUTPATIENT)
Dept: GENERAL RADIOLOGY | Facility: HOSPITAL | Age: 82
Discharge: HOME | End: 2022-07-26
Admitting: UROLOGY

## 2022-07-26 DIAGNOSIS — R39.15 URGENCY OF URINATION: Primary | ICD-10-CM

## 2022-07-26 DIAGNOSIS — R39.15 URGENCY OF URINATION: ICD-10-CM

## 2022-07-26 PROCEDURE — 72170 X-RAY EXAM OF PELVIS: CPT

## 2022-09-08 DIAGNOSIS — E11.65 TYPE 2 DIABETES MELLITUS WITH HYPERGLYCEMIA, WITH LONG-TERM CURRENT USE OF INSULIN: ICD-10-CM

## 2022-09-08 DIAGNOSIS — Z79.4 TYPE 2 DIABETES MELLITUS WITH HYPERGLYCEMIA, WITH LONG-TERM CURRENT USE OF INSULIN: ICD-10-CM

## 2022-09-08 RX ORDER — INSULIN GLARGINE 100 [IU]/ML
50 INJECTION, SOLUTION SUBCUTANEOUS EVERY EVENING
Qty: 15 ML | Refills: 2 | Status: CANCELLED | OUTPATIENT
Start: 2022-09-08 | End: 2022-12-07

## 2022-09-08 NOTE — TELEPHONE ENCOUNTER
Caller: Sabi Haynes    Relationship: Self    Best call back number: [phone]    Requested Prescriptions:   Requested Prescriptions     Pending Prescriptions Disp Refills   • Insulin Glargine (Lantus SoloStar) 100 UNIT/ML injection pen 15 mL 2     Sig: Inject 50 Units under the skin into the appropriate area as directed Every Evening for 90 days. TAKES AROUND 1430 EVERY DAY. INSTRUCTED PER ANESTHESIA PROTOCOL        Pharmacy where request should be sent: [facility] [place] [facility] [place] [place] - [address] [phone] [facility] [phone] [facility]     Additional details provided by patient:PATIENT IS ON HER LAST PEN. PLEASE CALL NEW PRESCRIPTION INTO THE PHARMACY ASAP TODAY. THERE SEEMS TO BE A DELAY WITH THIS PHARMACY. APPOINTMENT HAS BEEN MADE. PLEASE CALL PATIENT WHEN THIS HAS BEEN SUBMITTED TO PHARMACY.    Does the patient have less than a 3 day supply:  [x] Yes  [] No    Suzanne Chacko Rep   09/08/22 12:43 EDT

## 2022-09-12 DIAGNOSIS — Z79.4 TYPE 2 DIABETES MELLITUS WITH HYPERGLYCEMIA, WITH LONG-TERM CURRENT USE OF INSULIN: ICD-10-CM

## 2022-09-12 DIAGNOSIS — E11.65 TYPE 2 DIABETES MELLITUS WITH HYPERGLYCEMIA, WITH LONG-TERM CURRENT USE OF INSULIN: ICD-10-CM

## 2022-09-12 RX ORDER — INSULIN GLARGINE 100 [IU]/ML
50 INJECTION, SOLUTION SUBCUTANEOUS EVERY EVENING
Qty: 15 ML | Refills: 2 | Status: SHIPPED | OUTPATIENT
Start: 2022-09-12 | End: 2022-09-12 | Stop reason: SDUPTHER

## 2022-09-12 RX ORDER — INSULIN GLARGINE 100 [IU]/ML
50 INJECTION, SOLUTION SUBCUTANEOUS EVERY EVENING
Qty: 15 ML | Refills: 2 | Status: SHIPPED | OUTPATIENT
Start: 2022-09-12 | End: 2022-12-02 | Stop reason: SDUPTHER

## 2022-09-12 RX ORDER — FLURBIPROFEN SODIUM 0.3 MG/ML
SOLUTION/ DROPS OPHTHALMIC
COMMUNITY
Start: 2022-06-15 | End: 2022-12-02 | Stop reason: SDUPTHER

## 2022-09-12 NOTE — TELEPHONE ENCOUNTER
Caller: Sabi Haynes    Relationship: Self    Best call back number: [phone]    Requested Prescriptions:   Requested Prescriptions     Pending Prescriptions Disp Refills   • Insulin Glargine (Lantus SoloStar) 100 UNIT/ML injection pen 15 mL 2     Sig: Inject 50 Units under the skin into the appropriate area as directed Every Evening for 90 days. TAKES AROUND 1430 EVERY DAY. INSTRUCTED PER ANESTHESIA PROTOCOL        Pharmacy where request should be sent: [facility]  [place] [place] - [address] [facility] [address] [facility] [address] [phone] [facility] [phone] FX     Additional details provided by patient: 3 DAYS LEFT     Does the patient have less than a 3 day supply:  [x] Yes  [] No    Szuanne Wade Rep   09/12/22 14:19 EDT

## 2022-10-27 ENCOUNTER — OFFICE VISIT (OUTPATIENT)
Dept: FAMILY MEDICINE CLINIC | Facility: CLINIC | Age: 82
End: 2022-10-27

## 2022-10-27 VITALS
BODY MASS INDEX: 28.93 KG/M2 | RESPIRATION RATE: 16 BRPM | SYSTOLIC BLOOD PRESSURE: 142 MMHG | TEMPERATURE: 98 F | OXYGEN SATURATION: 98 % | HEART RATE: 82 BPM | WEIGHT: 180 LBS | DIASTOLIC BLOOD PRESSURE: 86 MMHG | HEIGHT: 66 IN

## 2022-10-27 DIAGNOSIS — R79.89 ABNORMAL CBC: ICD-10-CM

## 2022-10-27 DIAGNOSIS — N32.9 BLADDER DISORDER: ICD-10-CM

## 2022-10-27 DIAGNOSIS — R06.02 SHORTNESS OF BREATH: ICD-10-CM

## 2022-10-27 DIAGNOSIS — E03.9 HYPOTHYROIDISM, UNSPECIFIED TYPE: ICD-10-CM

## 2022-10-27 DIAGNOSIS — E53.8 VITAMIN B12 DEFICIENCY: ICD-10-CM

## 2022-10-27 DIAGNOSIS — Z79.4 TYPE 2 DIABETES MELLITUS WITH HYPERGLYCEMIA, WITH LONG-TERM CURRENT USE OF INSULIN: ICD-10-CM

## 2022-10-27 DIAGNOSIS — I10 PRIMARY HYPERTENSION: Primary | ICD-10-CM

## 2022-10-27 DIAGNOSIS — E11.65 TYPE 2 DIABETES MELLITUS WITH HYPERGLYCEMIA, WITH LONG-TERM CURRENT USE OF INSULIN: ICD-10-CM

## 2022-10-27 LAB
ALBUMIN SERPL-MCNC: 4.4 G/DL (ref 3.5–5.2)
ALBUMIN/GLOB SERPL: 1.4 G/DL
ALP SERPL-CCNC: 78 U/L (ref 39–117)
ALT SERPL W P-5'-P-CCNC: 13 U/L (ref 1–33)
ANION GAP SERPL CALCULATED.3IONS-SCNC: 11.2 MMOL/L (ref 5–15)
AST SERPL-CCNC: 19 U/L (ref 1–32)
BASOPHILS # BLD AUTO: 0.03 10*3/MM3 (ref 0–0.2)
BASOPHILS NFR BLD AUTO: 0.5 % (ref 0–1.5)
BILIRUB SERPL-MCNC: 0.5 MG/DL (ref 0–1.2)
BUN SERPL-MCNC: 16 MG/DL (ref 8–23)
BUN/CREAT SERPL: 16.2 (ref 7–25)
CALCIUM SPEC-SCNC: 9.7 MG/DL (ref 8.6–10.5)
CHLORIDE SERPL-SCNC: 99 MMOL/L (ref 98–107)
CO2 SERPL-SCNC: 28.8 MMOL/L (ref 22–29)
CREAT SERPL-MCNC: 0.99 MG/DL (ref 0.57–1)
DEPRECATED RDW RBC AUTO: 46.1 FL (ref 37–54)
EGFRCR SERPLBLD CKD-EPI 2021: 57.4 ML/MIN/1.73
EOSINOPHIL # BLD AUTO: 0.37 10*3/MM3 (ref 0–0.4)
EOSINOPHIL NFR BLD AUTO: 6.3 % (ref 0.3–6.2)
ERYTHROCYTE [DISTWIDTH] IN BLOOD BY AUTOMATED COUNT: 14.4 % (ref 12.3–15.4)
GLOBULIN UR ELPH-MCNC: 3.2 GM/DL
GLUCOSE SERPL-MCNC: 148 MG/DL (ref 65–99)
HBA1C MFR BLD: 8.9 % (ref 4.8–5.6)
HCT VFR BLD AUTO: 43 % (ref 34–46.6)
HGB BLD-MCNC: 14.5 G/DL (ref 12–15.9)
IMM GRANULOCYTES # BLD AUTO: 0.02 10*3/MM3 (ref 0–0.05)
IMM GRANULOCYTES NFR BLD AUTO: 0.3 % (ref 0–0.5)
IRON 24H UR-MRATE: 120 MCG/DL (ref 37–145)
LYMPHOCYTES # BLD AUTO: 1.84 10*3/MM3 (ref 0.7–3.1)
LYMPHOCYTES NFR BLD AUTO: 31.5 % (ref 19.6–45.3)
MCH RBC QN AUTO: 29.3 PG (ref 26.6–33)
MCHC RBC AUTO-ENTMCNC: 33.7 G/DL (ref 31.5–35.7)
MCV RBC AUTO: 86.9 FL (ref 79–97)
MONOCYTES # BLD AUTO: 0.67 10*3/MM3 (ref 0.1–0.9)
MONOCYTES NFR BLD AUTO: 11.5 % (ref 5–12)
NEUTROPHILS NFR BLD AUTO: 2.92 10*3/MM3 (ref 1.7–7)
NEUTROPHILS NFR BLD AUTO: 49.9 % (ref 42.7–76)
NRBC BLD AUTO-RTO: 0 /100 WBC (ref 0–0.2)
PLATELET # BLD AUTO: 227 10*3/MM3 (ref 140–450)
PMV BLD AUTO: 11.1 FL (ref 6–12)
POTASSIUM SERPL-SCNC: 4.6 MMOL/L (ref 3.5–5.2)
PROT SERPL-MCNC: 7.6 G/DL (ref 6–8.5)
RBC # BLD AUTO: 4.95 10*6/MM3 (ref 3.77–5.28)
SODIUM SERPL-SCNC: 139 MMOL/L (ref 136–145)
TSH SERPL DL<=0.05 MIU/L-ACNC: 5.17 UIU/ML (ref 0.27–4.2)
WBC NRBC COR # BLD: 5.85 10*3/MM3 (ref 3.4–10.8)

## 2022-10-27 PROCEDURE — 84443 ASSAY THYROID STIM HORMONE: CPT | Performed by: FAMILY MEDICINE

## 2022-10-27 PROCEDURE — 85025 COMPLETE CBC W/AUTO DIFF WBC: CPT | Performed by: FAMILY MEDICINE

## 2022-10-27 PROCEDURE — 83540 ASSAY OF IRON: CPT | Performed by: FAMILY MEDICINE

## 2022-10-27 PROCEDURE — 96372 THER/PROPH/DIAG INJ SC/IM: CPT | Performed by: FAMILY MEDICINE

## 2022-10-27 PROCEDURE — 36415 COLL VENOUS BLD VENIPUNCTURE: CPT | Performed by: FAMILY MEDICINE

## 2022-10-27 PROCEDURE — 99214 OFFICE O/P EST MOD 30 MIN: CPT | Performed by: FAMILY MEDICINE

## 2022-10-27 PROCEDURE — 83036 HEMOGLOBIN GLYCOSYLATED A1C: CPT | Performed by: FAMILY MEDICINE

## 2022-10-27 PROCEDURE — 80053 COMPREHEN METABOLIC PANEL: CPT | Performed by: FAMILY MEDICINE

## 2022-10-27 RX ORDER — CYANOCOBALAMIN 1000 UG/ML
1000 INJECTION, SOLUTION INTRAMUSCULAR; SUBCUTANEOUS ONCE
Status: COMPLETED | OUTPATIENT
Start: 2022-10-27 | End: 2022-10-27

## 2022-10-27 RX ADMIN — CYANOCOBALAMIN 1000 MCG: 1000 INJECTION, SOLUTION INTRAMUSCULAR; SUBCUTANEOUS at 15:23

## 2022-10-27 NOTE — PROGRESS NOTES
Answers for HPI/ROS submitted by the patient on 10/25/2022  What is the primary reason for your visit?: Diabetes  Diabetes type: type 2  MedicAlert ID: [id number]  Disease duration: 17 Years  blurred vision: No  chest pain: No  fatigue: Yes  foot paresthesias: No  foot ulcerations: No  polydipsia: No  polyphagia: No  polyuria: No  visual change: No  weakness: Yes  weight loss: No  Symptom course: stable  confusion: No  dizziness: No  headaches: No  hunger: No  mood changes: No  nervous/anxious: No  pallor: No  seizures: No  sleepiness: Yes  speech difficulty: No  sweats: No  tremors: No  blackouts: No  hospitalization: No  nocturnal hypoglycemia: No  required assistance: No  required glucagon: No  CVA: No  heart disease: No  impotence: No  nephropathy: Yes  peripheral neuropathy: Yes  PVD: Yes  retinopathy: Yes  CAD risks: dyslipidemia, obesity, sedentary lifestyle  Current treatments: insulin injections  Treatment compliance: all of the time  Dose schedule: at bedtime  Given by: patient  Injection sites: abdominal wall  Home blood tests: 1-2 x per week  Home urines: <1 x per month  Monitoring compliance: no compliance  Weight trend: fluctuating minimally  Current diet: generally healthy  Meal planning: none  Exercise: never  Dietitian visit: No  Eye exam current: No  Sees podiatrist: No    Chief Complaint  Chief Complaint   Patient presents with   • Diabetes   • Knee Pain   • Shoulder Pain     Hx surgery       HPI:  Sabi Haynes presents to [facility]     Knee pain- Pt reports she had been going to a chiropractor to help with her arm and knee pain.  Pt reports that her sciatic nerve does feel better.     Urinary retention-  Pt has had a kidney stone and stent put in for it.  Pt reports she has been urinating on herself.  Pt reports she has a stimulator that was put in for her bladder and she is not happy with it because you have to set the numbers in it and she had to keep using her  grand-daughter to change the numbers.     DM Type 2- pt last Hgb A1c was 8.18 on 8/20/21 which was well controlled.  Currently compliant with medication regimen and will continue as prescribed.     Fatigue and shortness of breath- pt reports she gets short of breath with walking and she is not sure why but she has not had labs checked in 1 year so we will be rechecking everything.  Pt also has a deep cough.       Past History:  Medical History: has a past medical history of Acid reflux, Allergic (2002), Arthritis, Bladder disorder, Bladder paralysis, Blind spot scotoma, left, Condition not found, Coronary artery disease (1975), DDD (degenerative disc disease), cervical, DDD (degenerative disc disease), lumbar, Diabetes mellitus (HCC), Gastric ulcer, Hearing loss, Heart attack (HCC), Heart disease, Hemorrhoids, History of confusion, History of migraine headaches, Hyperlipemia, Hyperlipidemia, Hypothyroidism, Incontinence of bowel, Incontinence of urine, Kidney disease, Left hand pain, Limb swelling, Macular degeneration, PONV (postoperative nausea and vomiting), Primary osteoarthritis of right knee (08/24/2018), Recent total retinal detachment, Renal calculi, Renal insufficiency (1965), Right BBB/left ant fasc block, Ringing in ears, Rotator cuff tear (06/17/2021), S/P arthroscopic partial medial meniscectomy (12/08/2017), S/P medial meniscectomy of right knee (10/11/2017), Seasonal allergies, Self-catheterizes urinary bladder, Short-term memory loss, Shortness of breath, Spinal stenosis, Stroke (HCC), Thyroid disorder, and Vitamin D deficiency.   Surgical History: has a past surgical history that includes Varicose vein surgery; Cholecystectomy; Hysterectomy; Laparoscopic tubal ligation; Tear duct surgery; Extracorporeal shock wave lithotripsy (Left, 9/13/2019); Other surgical history; Bladder surgery (2002); Carpal tunnel release; Colonoscopy; Cyst Removal; Gallbladder surgery; Total hip arthroplasty (2010);  Hysterectomy (1969); Knee surgery (08/22); Eye surgery; Joint replacement; and Shoulder arthroscopy w/ rotator cuff repair (Right, 6/17/2021).   Family History: family history includes Arthritis in her father and mother; Cancer in her father; Diabetes in her mother; Heart disease in her mother; Leukemia in her father; Osteoporosis in her brother; Stroke in her son.   Social History: reports that she has never smoked. She has never been exposed to tobacco smoke. She has never used smokeless tobacco. She reports that she does not drink alcohol and does not use drugs.  Immunization History   Administered Date(s) Administered   • COVID-19 (PFIZER) PURPLE CAP 05/14/2021, 06/04/2021   • Covid-19 (Pfizer) Gray Cap 01/19/2022   • PPD Test 06/22/2021   • Tdap 04/11/2017         Allergies: Atorvastatin, Aspirin, Ibuprofen, Morphine, Atenolol, Digoxin, Latex, Meperidine, and Metformin     Medications:  Current Outpatient Medications on File Prior to Visit   Medication Sig Dispense Refill   • Alcohol Swabs (Easy Touch Alcohol Prep Medium) 70 % pads 1 application by Epidural route 3 (Three) Times a Day. 100 each 5   • B-D UF III MINI PEN NEEDLES 31G X 5 MM misc      • Cholecalciferol (VITAMIN D3) 5000 units capsule capsule Take 5,000 Units by mouth Daily.     • glucose blood (FREESTYLE LITE) test strip      • Insulin Glargine (Lantus SoloStar) 100 UNIT/ML injection pen Inject 50 Units under the skin into the appropriate area as directed Every Evening for 90 days. TAKES AROUND 1430 EVERY DAY. INSTRUCTED PER ANESTHESIA PROTOCOL 15 mL 2   • levothyroxine (SYNTHROID, LEVOTHROID) 112 MCG tablet Take 1 tablet by mouth Daily for 90 days. 90 tablet 3   • vitamin B-12 (CYANOCOBALAMIN) 500 MCG tablet Take 500 mcg by mouth Daily.     • HYDROcodone-acetaminophen (Norco) 7.5-325 MG per tablet Take 1 tablet by mouth Every 6 (Six) Hours As Needed for Moderate Pain . 28 tablet 0   • Lancets (freestyle) lancets Prick finger to check blood sugars  "up to 3 times a day 100 each 5   • Premarin 0.625 MG/GM vaginal cream      • triamcinolone (KENALOG) 0.1 % ointment        No current facility-administered medications on file prior to visit.        Health Maintenance Due   Topic Date Due   • DXA SCAN  Never done   • Pneumococcal Vaccine 65+ (1 - PCV) Never done   • ZOSTER VACCINE (1 of 2) Never done   • ANNUAL WELLNESS VISIT  Never done   • LIPID PANEL  09/26/2020   • URINE MICROALBUMIN  09/27/2020   • DIABETIC EYE EXAM  02/04/2021   • HEMOGLOBIN A1C  02/20/2022   • COVID-19 Vaccine (4 - Booster for Pfizer series) 03/16/2022   • INFLUENZA VACCINE  Never done       Vital Signs:   Vitals:    10/27/22 1448   BP: 142/86   BP Location: Left arm   Patient Position: Sitting   Pulse: 82   Resp: 16   Temp: 98 °F (36.7 °C)   SpO2: 98%   Weight: 81.6 kg (180 lb)   Height: 167.6 cm (66\")      Body mass index is 29.05 kg/m².     ROS:  Review of Systems   Constitutional: Positive for fatigue. Negative for fever and unexpected weight loss.   HENT: Negative for congestion, ear pain and sinus pressure.    Eyes: Negative for blurred vision.   Respiratory: Negative for cough, chest tightness and shortness of breath.    Cardiovascular: Negative for chest pain, palpitations and leg swelling.   Gastrointestinal: Negative for abdominal pain and diarrhea.   Endocrine: Negative for polydipsia, polyphagia and polyuria.   Genitourinary: Negative for dysuria and frequency.   Skin: Negative for pallor.   Neurological: Positive for weakness. Negative for dizziness, tremors, seizures, speech difficulty, headache and confusion.   Psychiatric/Behavioral: Negative for agitation and behavioral problems. The patient is not nervous/anxious.           Physical Exam  Vitals reviewed.   Constitutional:       Appearance: Normal appearance.   HENT:      Right Ear: Tympanic membrane normal.      Left Ear: Tympanic membrane normal.      Nose: Nose normal.   Eyes:      Extraocular Movements: Extraocular " movements intact.      Conjunctiva/sclera: Conjunctivae normal.      Pupils: Pupils are equal, round, and reactive to light.   Cardiovascular:      Rate and Rhythm: Normal rate and regular rhythm.   Pulmonary:      Effort: Pulmonary effort is normal.      Breath sounds: Normal breath sounds.   Abdominal:      General: Bowel sounds are normal.   Musculoskeletal:         General: Normal range of motion.      Cervical back: Normal range of motion.   Skin:     General: Skin is warm and dry.   Neurological:      General: No focal deficit present.      Mental Status: She is alert and oriented to person, place, and time.   Psychiatric:         Mood and Affect: Mood normal.         Behavior: Behavior normal.          Result Review   Hemoglobin A1c (08/20/2021 15:07)  MRI Lumbar Spine Wo Con (04/16/2022 12:35)  MRI Humerus Right Without Contrast (11/08/2021 16:34)  Basic Metabolic Panel (10/12/2021 11:29)         Diagnoses and all orders for this visit:    1. Primary hypertension (Primary)    2. Type 2 diabetes mellitus with hyperglycemia, with long-term current use of insulin (HCC)  -     Comprehensive Metabolic Panel  -     Hemoglobin A1c    3. Bladder disorder    4. Abnormal CBC  -     CBC Auto Differential  -     Iron    5. Shortness of breath  -     XR Chest PA & Lateral; Future    6. Hypothyroidism, unspecified type  -     TSH    7. Vitamin B12 deficiency  -     cyanocobalamin injection 1,000 mcg          Smoking Cessation:    Sabi Haynes  reports that she has never smoked. She has never been exposed to tobacco smoke. She has never used smokeless tobacco.          Follow Up   Return in about 3 months (around 1/27/2023).  Patient was given instructions and counseling regarding her condition or for health maintenance advice. Please see specific information pulled into the AVS if appropriate.       Drea Villalpando MD

## 2022-10-31 ENCOUNTER — HOSPITAL ENCOUNTER (OUTPATIENT)
Dept: GENERAL RADIOLOGY | Facility: HOSPITAL | Age: 82
Discharge: HOME | End: 2022-10-31
Admitting: FAMILY MEDICINE

## 2022-10-31 DIAGNOSIS — R06.02 SHORTNESS OF BREATH: ICD-10-CM

## 2022-10-31 PROCEDURE — 71046 X-RAY EXAM CHEST 2 VIEWS: CPT

## 2022-11-17 ENCOUNTER — TELEPHONE (OUTPATIENT)
Dept: FAMILY MEDICINE CLINIC | Facility: CLINIC | Age: 82
End: 2022-11-17

## 2022-11-17 NOTE — TELEPHONE ENCOUNTER
Caller: Sabi Haynes    Relationship: Self    Best call back number: [phone]    What test was performed: X RAY    When was the test performed:  10/31/22     Where was the test performed: [facility]    PLEASE CALL PATIENT TO ADVISE OF RESULTS.

## 2022-11-21 NOTE — TELEPHONE ENCOUNTER
Patient reviewed Xray results via [email or web address].  Unsuccessful contact trying to touch base with patient to determine if she had any questions regarding her xray

## 2022-11-22 NOTE — TELEPHONE ENCOUNTER
Unsuccessful contact with patient.  Patient has apt scheduled for 12/2.  Will discuss results at apt.

## 2022-12-02 ENCOUNTER — OFFICE VISIT (OUTPATIENT)
Dept: FAMILY MEDICINE CLINIC | Facility: CLINIC | Age: 82
End: 2022-12-02

## 2022-12-02 VITALS
OXYGEN SATURATION: 97 % | WEIGHT: 179 LBS | BODY MASS INDEX: 28.77 KG/M2 | DIASTOLIC BLOOD PRESSURE: 78 MMHG | HEIGHT: 66 IN | SYSTOLIC BLOOD PRESSURE: 138 MMHG | TEMPERATURE: 98.5 F | HEART RATE: 88 BPM

## 2022-12-02 DIAGNOSIS — E53.8 VITAMIN B12 DEFICIENCY: ICD-10-CM

## 2022-12-02 DIAGNOSIS — I10 PRIMARY HYPERTENSION: ICD-10-CM

## 2022-12-02 DIAGNOSIS — Z79.4 TYPE 2 DIABETES MELLITUS WITH HYPERGLYCEMIA, WITH LONG-TERM CURRENT USE OF INSULIN: ICD-10-CM

## 2022-12-02 DIAGNOSIS — R06.09 DYSPNEA ON EXERTION: ICD-10-CM

## 2022-12-02 DIAGNOSIS — E03.9 HYPOTHYROIDISM, UNSPECIFIED TYPE: ICD-10-CM

## 2022-12-02 DIAGNOSIS — I25.10 CORONARY ARTERY DISEASE INVOLVING NATIVE HEART WITHOUT ANGINA PECTORIS, UNSPECIFIED VESSEL OR LESION TYPE: Primary | ICD-10-CM

## 2022-12-02 DIAGNOSIS — E11.65 TYPE 2 DIABETES MELLITUS WITH HYPERGLYCEMIA, WITH LONG-TERM CURRENT USE OF INSULIN: ICD-10-CM

## 2022-12-02 PROCEDURE — 99214 OFFICE O/P EST MOD 30 MIN: CPT | Performed by: FAMILY MEDICINE

## 2022-12-02 PROCEDURE — 96372 THER/PROPH/DIAG INJ SC/IM: CPT | Performed by: FAMILY MEDICINE

## 2022-12-02 RX ORDER — ASCORBIC ACID/VITAMIN E/BIOTIN 7.5MG-125
TABLET,CHEWABLE ORAL
COMMUNITY
Start: 2022-06-14 | End: 2022-12-14

## 2022-12-02 RX ORDER — INSULIN GLARGINE 100 [IU]/ML
50 INJECTION, SOLUTION SUBCUTANEOUS EVERY EVENING
Qty: 15 ML | Refills: 2 | Status: SHIPPED | OUTPATIENT
Start: 2022-12-02 | End: 2023-01-20 | Stop reason: SDUPTHER

## 2022-12-02 RX ORDER — LEVOTHYROXINE SODIUM 0.12 MG/1
125 TABLET ORAL DAILY
Qty: 90 TABLET | Refills: 0 | Status: SHIPPED | OUTPATIENT
Start: 2022-12-02 | End: 2023-03-02

## 2022-12-02 RX ORDER — FLURBIPROFEN SODIUM 0.3 MG/ML
1 SOLUTION/ DROPS OPHTHALMIC 3 TIMES DAILY
Qty: 90 EACH | Refills: 11 | Status: SHIPPED | OUTPATIENT
Start: 2022-12-02 | End: 2023-01-01

## 2022-12-02 RX ORDER — CONJUGATED ESTROGENS 0.62 MG/G
CREAM VAGINAL DAILY
Qty: 30 G | Refills: 11 | Status: SHIPPED | OUTPATIENT
Start: 2022-12-02 | End: 2023-01-01

## 2022-12-02 RX ORDER — CYANOCOBALAMIN 1000 UG/ML
1000 INJECTION, SOLUTION INTRAMUSCULAR; SUBCUTANEOUS
Status: SHIPPED | OUTPATIENT
Start: 2022-12-02 | End: 2023-10-28

## 2022-12-02 RX ADMIN — CYANOCOBALAMIN 1000 MCG: 1000 INJECTION, SOLUTION INTRAMUSCULAR; SUBCUTANEOUS at 17:25

## 2022-12-02 NOTE — PROGRESS NOTES
"Chief Complaint  Chief Complaint   Patient presents with   • Xray results   • Med Refill   • Itchy skin   • Cold all the time       HPI:  Sabi Haynes presents to [facility]     Pt reports her thanksgiving wasn't as good since she broke one of her teeth and needs to get new dentures.     Hypothyroidism- pt reports she is tired and cold all the time which is can be her TSH is better.  Pt is taking 112mcg and we will be increasing it to 125mcg.     Coronary Artery Disease- pt reports she was previously seeing Dr. Peace but she had a difficult time understanding him \"due to his foreign accent\".  Pt would like to see a different Cardiologist.  Pt had a CXR on 10/31/22 which showed Aortic Vascular calcifications.  Pt reports she occasionally gets pain in the left side of her chest.  I will be referring her to Cardiologist [facility].    HTN- Pt BP today is 138/78 which is well controlled.  Pt is compliant with their medication and will continue their current medication regimen. No side effects to the medication.    Past History:  Medical History: has a past medical history of Acid reflux, Allergic (2002), Arthritis, Bladder disorder, Bladder paralysis, Blind spot scotoma, left, Condition not found, Coronary artery disease (1975), DDD (degenerative disc disease), cervical, DDD (degenerative disc disease), lumbar, Diabetes mellitus (HCC), Gastric ulcer, Hearing loss, Heart attack (HCC), Heart disease, Hemorrhoids, History of confusion, History of migraine headaches, Hyperlipemia, Hyperlipidemia, Hypothyroidism, Incontinence of bowel, Incontinence of urine, Kidney disease, Left hand pain, Limb swelling, Macular degeneration, PONV (postoperative nausea and vomiting), Primary osteoarthritis of right knee (08/24/2018), Recent total retinal detachment, Renal calculi, Renal insufficiency (1965), Right BBB/left ant fasc block, Ringing in ears, Rotator cuff tear (06/17/2021), S/P arthroscopic " partial medial meniscectomy (12/08/2017), S/P medial meniscectomy of right knee (10/11/2017), Seasonal allergies, Self-catheterizes urinary bladder, Short-term memory loss, Shortness of breath, Spinal stenosis, Stroke ([facility]), Thyroid disorder, and Vitamin D deficiency.   Surgical History: has a past surgical history that includes Varicose vein surgery; Cholecystectomy; Hysterectomy; Laparoscopic tubal ligation; Tear duct surgery; Extracorporeal shock wave lithotripsy (Left, 9/13/2019); Other surgical history; Bladder surgery (2002); Carpal tunnel release; Colonoscopy; Cyst Removal; Gallbladder surgery; Total hip arthroplasty (2010); Hysterectomy (1969); Knee surgery (08/22); Eye surgery; Joint replacement; and Shoulder arthroscopy w/ rotator cuff repair (Right, 6/17/2021).   Family History: family history includes Arthritis in her father and mother; Cancer in her father; Diabetes in her mother; Heart disease in her mother; Leukemia in her father; Osteoporosis in her brother; Stroke in her son.   Social History: reports that she has never smoked. She has never been exposed to tobacco smoke. She has never used smokeless tobacco. She reports that she does not drink alcohol and does not use drugs.  Immunization History   Administered Date(s) Administered   • COVID-19 (PFIZER) PURPLE CAP 05/14/2021, 06/04/2021   • Covid-19 (Pfizer) Gray Cap 01/19/2022   • PPD Test 06/22/2021   • Tdap 04/11/2017, 08/16/2022         Allergies: Atorvastatin, Aspirin, Ibuprofen, Morphine, Atenolol, Digoxin, Latex, Meperidine, and Metformin     Medications:  Current Outpatient Medications on File Prior to Visit   Medication Sig Dispense Refill   • Alcohol Swabs (Easy Touch Alcohol Prep Medium) 70 % pads 1 application by Epidural route 3 (Three) Times a Day. 100 each 5   • Ascorbic Acid 100 MG chewable tablet   Oral, MoWeFr, 0 Refill(s)     • Biotin w/ Vitamins C & E (Hair Skin & Nails Gummies) 1250-7.5-7.5 MCG-MG-UNT chewable tablet   1 gummy,  "Chew, MoWeFr, last 6-10, 0 Refill(s)     • Cholecalciferol (VITAMIN D3) 5000 units capsule capsule Take 5,000 Units by mouth Daily.     • glucose blood (FREESTYLE LITE) test strip      • HYDROcodone-acetaminophen (Norco) 7.5-325 MG per tablet Take 1 tablet by mouth Every 6 (Six) Hours As Needed for Moderate Pain . 28 tablet 0   • Lancets (freestyle) lancets Prick finger to check blood sugars up to 3 times a day 100 each 5   • triamcinolone (KENALOG) 0.1 % ointment      • vitamin B-12 (CYANOCOBALAMIN) 500 MCG tablet Take 500 mcg by mouth Daily.       No current facility-administered medications on file prior to visit.        Health Maintenance Due   Topic Date Due   • DXA SCAN  Never done   • Pneumococcal Vaccine 65+ (1 - PCV) Never done   • ZOSTER VACCINE (1 of 2) Never done   • ANNUAL WELLNESS VISIT  Never done   • LIPID PANEL  09/26/2020   • URINE MICROALBUMIN  09/27/2020   • DIABETIC EYE EXAM  02/04/2021   • INFLUENZA VACCINE  Never done       Vital Signs:   Vitals:    12/02/22 1532   BP: 138/78   Pulse: 88   Temp: 98.5 °F (36.9 °C)   SpO2: 97%   Weight: 81.2 kg (179 lb)   Height: 167.6 cm (66\")      Body mass index is 28.89 kg/m².     ROS:  Review of Systems   Constitutional: Negative for fatigue and fever.   HENT: Negative for congestion, ear pain and sinus pressure.    Respiratory: Negative for cough, chest tightness and shortness of breath.    Cardiovascular: Negative for chest pain, palpitations and leg swelling.   Gastrointestinal: Negative for abdominal pain and diarrhea.   Genitourinary: Negative for dysuria and frequency.   Neurological: Negative for speech difficulty, headache and confusion.   Psychiatric/Behavioral: Negative for agitation and behavioral problems.          Physical Exam  Constitutional:       Appearance: Normal appearance.   HENT:      Right Ear: Tympanic membrane normal.      Left Ear: Tympanic membrane normal.      Nose: Nose normal.   Eyes:      Extraocular Movements: Extraocular " movements intact.      Conjunctiva/sclera: Conjunctivae normal.      Pupils: Pupils are equal, round, and reactive to light.   Cardiovascular:      Rate and Rhythm: Normal rate and regular rhythm.   Pulmonary:      Effort: Pulmonary effort is normal.      Breath sounds: Normal breath sounds.   Abdominal:      General: Bowel sounds are normal.   Musculoskeletal:         General: Normal range of motion.      Cervical back: Normal range of motion.   Skin:     General: Skin is warm and dry.   Neurological:      General: No focal deficit present.      Mental Status: She is alert and oriented to person, place, and time.   Psychiatric:         Mood and Affect: Mood normal.         Behavior: Behavior normal.          Result Review   XR Chest PA & Lateral (10/31/2022 15:30)  PET Heart Image Multiple (10/22/2020 11:24)       Diagnoses and all orders for this visit:    1. Coronary artery disease involving native heart without angina pectoris, unspecified vessel or lesion type (Primary)  -     Ambulatory Referral to Cardiology  -     Adult Transthoracic Echo Complete W/ Cont if Necessary Per Protocol; Future    2. Dyspnea on exertion  -     Adult Transthoracic Echo Complete W/ Cont if Necessary Per Protocol; Future    3. Hypothyroidism, unspecified type  -     levothyroxine (SYNTHROID, LEVOTHROID) 125 MCG tablet; Take 1 tablet by mouth Daily for 90 days.  Dispense: 90 tablet; Refill: 0    4. Type 2 diabetes mellitus with hyperglycemia, with long-term current use of insulin (HCC)  -     Insulin Glargine (Lantus SoloStar) 100 UNIT/ML injection pen; Inject 50 Units under the skin into the appropriate area as directed Every Evening for 90 days. TAKES AROUND 1430 EVERY DAY. INSTRUCTED PER ANESTHESIA PROTOCOL  Dispense: 15 mL; Refill: 2  -     B-D UF III MINI PEN NEEDLES 31G X 5 MM misc; Inject 1 application under the skin into the appropriate area as directed 3 (Three) Times a Day for 30 days.  Dispense: 90 each; Refill: 11    5.  Primary hypertension    6. Vitamin B12 deficiency  -     cyanocobalamin injection 1,000 mcg    Other orders  -     Premarin 0.625 MG/GM vaginal cream; Insert  into the vagina Daily for 30 days.  Dispense: 30 g; Refill: 11          Smoking Cessation:    Sabi Haynes  reports that she has never smoked. She has never been exposed to tobacco smoke. She has never used smokeless tobacco.        Follow Up   Return in about 4 weeks (around 12/30/2022).  Patient was given instructions and counseling regarding her condition or for health maintenance advice. Please see specific information pulled into the AVS if appropriate.       Drea Villalpando MD

## 2022-12-27 ENCOUNTER — OFFICE VISIT (OUTPATIENT)
Dept: FAMILY MEDICINE CLINIC | Facility: CLINIC | Age: 82
End: 2022-12-27
Payer: MEDICARE

## 2022-12-27 VITALS
BODY MASS INDEX: 29.18 KG/M2 | HEIGHT: 66 IN | TEMPERATURE: 98.4 F | HEART RATE: 88 BPM | OXYGEN SATURATION: 97 % | WEIGHT: 181.6 LBS | DIASTOLIC BLOOD PRESSURE: 68 MMHG | SYSTOLIC BLOOD PRESSURE: 132 MMHG

## 2022-12-27 DIAGNOSIS — I10 PRIMARY HYPERTENSION: Primary | ICD-10-CM

## 2022-12-27 DIAGNOSIS — J01.00 SUBACUTE MAXILLARY SINUSITIS: ICD-10-CM

## 2022-12-27 PROCEDURE — 99214 OFFICE O/P EST MOD 30 MIN: CPT | Performed by: FAMILY MEDICINE

## 2022-12-27 RX ORDER — CEPHALEXIN 500 MG/1
500 CAPSULE ORAL 2 TIMES DAILY
Qty: 20 CAPSULE | Refills: 0 | Status: SHIPPED | OUTPATIENT
Start: 2022-12-27 | End: 2023-01-06

## 2022-12-27 NOTE — PROGRESS NOTES
Chief Complaint  Chief Complaint   Patient presents with   • Cough   • URI       HPI:  Sabi Haynes presents to [facility]     URI- pt went to [facility] for sinus drainage and she was coughing and tested and diagnosed with URI.  Pt reports she has used up boxes of kleenex and she has taken Nyquill.  Pt was given flonase and tessalon perles to help with her symptoms. Pt reports she had diarrhea also.     HTN- Pt BP today is 132/68 which is well controlled.  Pt is compliant with their medication and will continue their current medication regimen. No side effects to the medication.      Procedures     Past History:  Medical History: has a past medical history of Acid reflux, Allergic (2002), Arthritis, Bladder disorder, Bladder paralysis, Blind spot scotoma, left, Condition not found, Coronary artery disease (1975), DDD (degenerative disc disease), cervical, DDD (degenerative disc disease), lumbar, Diabetes mellitus (HCC), Gastric ulcer, Hearing loss, Heart attack (HCC), Heart disease, Hemorrhoids, History of confusion, History of migraine headaches, Hyperlipemia, Hyperlipidemia, Hypothyroidism, Incontinence of bowel, Incontinence of urine, Kidney disease, Left hand pain, Limb swelling, Macular degeneration, PONV (postoperative nausea and vomiting), Primary osteoarthritis of right knee (08/24/2018), Recent total retinal detachment, Renal calculi, Renal insufficiency (1965), Right BBB/left ant fasc block, Ringing in ears, Rotator cuff tear (06/17/2021), S/P arthroscopic partial medial meniscectomy (12/08/2017), S/P medial meniscectomy of right knee (10/11/2017), Seasonal allergies, Self-catheterizes urinary bladder, Short-term memory loss, Shortness of breath, Spinal stenosis, Stroke (HCC), Thyroid disorder, and Vitamin D deficiency.   Surgical History: has a past surgical history that includes Varicose vein surgery; Cholecystectomy; Hysterectomy; Laparoscopic tubal ligation; Tear  duct surgery; Extracorporeal shock wave lithotripsy (Left, 9/13/2019); Other surgical history; Bladder surgery (2002); Carpal tunnel release; Colonoscopy; Cyst Removal; Gallbladder surgery; Total hip arthroplasty (2010); Hysterectomy (1969); Knee surgery (08/22); Eye surgery; Joint replacement; and Shoulder arthroscopy w/ rotator cuff repair (Right, 6/17/2021).   Family History: family history includes Arthritis in her father and mother; Cancer in her father; Diabetes in her mother; Heart disease in her mother; Leukemia in her father; Osteoporosis in her brother; Stroke in her son.   Social History: reports that she has never smoked. She has never been exposed to tobacco smoke. She has never used smokeless tobacco. She reports that she does not drink alcohol and does not use drugs.  Immunization History   Administered Date(s) Administered   • COVID-19 (PFIZER) PURPLE CAP 05/14/2021, 06/04/2021   • Covid-19 (Pfizer) Gray Cap 01/19/2022   • PPD Test 06/22/2021   • Tdap 04/11/2017, 08/16/2022         Allergies: Atorvastatin, Ibuprofen, Aspirin, Atenolol, Digoxin, Latex, Meperidine, Metformin, and Morphine     Medications:  Current Outpatient Medications on File Prior to Visit   Medication Sig Dispense Refill   • Alcohol Swabs (Easy Touch Alcohol Prep Medium) 70 % pads 1 application by Epidural route 3 (Three) Times a Day. 100 each 5   • benzonatate (TESSALON) 100 MG capsule Take 1 capsule by mouth 3 (Three) Times a Day As Needed for Cough. 30 capsule 0   • Cholecalciferol (VITAMIN D3) 5000 units capsule capsule Take 5,000 Units by mouth Daily.     • fluticasone (FLONASE) 50 MCG/ACT nasal spray 2 sprays into the nostril(s) as directed by provider Daily. 18.2 mL 0   • Lancets (freestyle) lancets Prick finger to check blood sugars up to 3 times a day 100 each 5   • levothyroxine (SYNTHROID, LEVOTHROID) 125 MCG tablet Take 1 tablet by mouth Daily for 90 days. 90 tablet 0   • vitamin B-12 (CYANOCOBALAMIN) 500 MCG tablet  "Take 500 mcg by mouth Daily.     • Ascorbic Acid 100 MG chewable tablet   Oral, MoWeFr, 0 Refill(s)     • glucose blood (FREESTYLE LITE) test strip        Current Facility-Administered Medications on File Prior to Visit   Medication Dose Route Frequency Provider Last Rate Last Admin   • cyanocobalamin injection 1,000 mcg  1,000 mcg Intramuscular Q30 Days Drea Villalpando MD   1,000 mcg at 12/02/22 1725        Health Maintenance Due   Topic Date Due   • DXA SCAN  Never done   • Pneumococcal Vaccine 65+ (1 - PCV) Never done   • ZOSTER VACCINE (1 of 2) Never done   • LIPID PANEL  09/26/2020   • URINE MICROALBUMIN  09/27/2020   • DIABETIC EYE EXAM  02/04/2021   • ANNUAL WELLNESS VISIT  09/08/2021   • INFLUENZA VACCINE  Never done       Vital Signs:   Vitals:    12/27/22 1521   BP: 132/68   Pulse: 88   Temp: 98.4 °F (36.9 °C)   SpO2: 97%   Weight: 82.4 kg (181 lb 9.6 oz)   Height: 167.6 cm (66\")      Body mass index is 29.31 kg/m².     ROS:  Review of Systems   Constitutional: Negative for fatigue and fever.   HENT: Negative for congestion, ear pain and sinus pressure.    Respiratory: Negative for cough, chest tightness and shortness of breath.    Cardiovascular: Negative for chest pain, palpitations and leg swelling.   Gastrointestinal: Negative for abdominal pain and diarrhea.   Genitourinary: Negative for dysuria and frequency.   Neurological: Negative for speech difficulty, headache and confusion.   Psychiatric/Behavioral: Negative for agitation and behavioral problems.          Physical Exam  Constitutional:       Appearance: Normal appearance.   HENT:      Right Ear: Tympanic membrane normal.      Left Ear: Tympanic membrane normal.      Nose: Nose normal.   Eyes:      Extraocular Movements: Extraocular movements intact.      Conjunctiva/sclera: Conjunctivae normal.      Pupils: Pupils are equal, round, and reactive to light.   Cardiovascular:      Rate and Rhythm: Normal rate and regular rhythm.   Pulmonary:      " Effort: Pulmonary effort is normal.      Breath sounds: Normal breath sounds.   Abdominal:      General: Bowel sounds are normal.   Musculoskeletal:         General: Normal range of motion.      Cervical back: Normal range of motion.   Skin:     General: Skin is warm and dry.   Neurological:      General: No focal deficit present.      Mental Status: She is alert and oriented to person, place, and time.   Psychiatric:         Mood and Affect: Mood normal.         Behavior: Behavior normal.          Result Review        Diagnoses and all orders for this visit:    1. Primary hypertension (Primary)    2. Subacute maxillary sinusitis  -     cephalexin (Keflex) 500 MG capsule; Take 1 capsule by mouth 2 (Two) Times a Day for 10 days.  Dispense: 20 capsule; Refill: 0          Smoking Cessation:    Sabi Haynes  reports that she has never smoked. She has never been exposed to tobacco smoke. She has never used smokeless tobacco.          Follow Up   Return if symptoms worsen or fail to improve.  Patient was given instructions and counseling regarding her condition or for health maintenance advice. Please see specific information pulled into the AVS if appropriate.       Drea Villalpando MD

## 2023-01-19 ENCOUNTER — TELEPHONE (OUTPATIENT)
Dept: FAMILY MEDICINE CLINIC | Facility: CLINIC | Age: 83
End: 2023-01-19

## 2023-01-19 NOTE — TELEPHONE ENCOUNTER
Caller: Sabi Haynes    Relationship: Self    Best call back number: [phone]    What is the best time to reach you: ANY    Who are you requesting to speak with (clinical staff, provider,  specific staff member):CLINICAL    What was the call regarding:RAFAEL STATED THAT SHE IS ONLY GETTING ONE BOX OF INSULIN PER MOTH AND IS REQUESTING CALL TO KNOW WHY SHE NO LONGER CAN GET SEVERAL BOXES AT A TIME.     Do you require a callback: YES

## 2023-01-20 DIAGNOSIS — E11.65 TYPE 2 DIABETES MELLITUS WITH HYPERGLYCEMIA, WITH LONG-TERM CURRENT USE OF INSULIN: ICD-10-CM

## 2023-01-20 DIAGNOSIS — Z79.4 TYPE 2 DIABETES MELLITUS WITH HYPERGLYCEMIA, WITH LONG-TERM CURRENT USE OF INSULIN: ICD-10-CM

## 2023-01-20 RX ORDER — INSULIN GLARGINE 100 [IU]/ML
50 INJECTION, SOLUTION SUBCUTANEOUS EVERY EVENING
Qty: 45 ML | Refills: 2 | Status: SHIPPED | OUTPATIENT
Start: 2023-01-20 | End: 2023-01-24 | Stop reason: SDUPTHER

## 2023-01-24 DIAGNOSIS — Z79.4 TYPE 2 DIABETES MELLITUS WITH HYPERGLYCEMIA, WITH LONG-TERM CURRENT USE OF INSULIN: ICD-10-CM

## 2023-01-24 DIAGNOSIS — E11.65 TYPE 2 DIABETES MELLITUS WITH HYPERGLYCEMIA, WITH LONG-TERM CURRENT USE OF INSULIN: ICD-10-CM

## 2023-01-24 RX ORDER — INSULIN GLARGINE 100 [IU]/ML
50 INJECTION, SOLUTION SUBCUTANEOUS EVERY EVENING
Qty: 45 ML | Refills: 2 | Status: SHIPPED | OUTPATIENT
Start: 2023-01-24 | End: 2023-01-26 | Stop reason: SDUPTHER

## 2023-01-25 ENCOUNTER — HOSPITAL ENCOUNTER (OUTPATIENT)
Dept: CARDIOLOGY | Facility: HOSPITAL | Age: 83
Discharge: HOME | End: 2023-01-25
Admitting: FAMILY MEDICINE
Payer: MEDICARE

## 2023-01-25 DIAGNOSIS — E11.65 TYPE 2 DIABETES MELLITUS WITH HYPERGLYCEMIA, WITH LONG-TERM CURRENT USE OF INSULIN: ICD-10-CM

## 2023-01-25 DIAGNOSIS — I25.10 CORONARY ARTERY DISEASE INVOLVING NATIVE HEART WITHOUT ANGINA PECTORIS, UNSPECIFIED VESSEL OR LESION TYPE: ICD-10-CM

## 2023-01-25 DIAGNOSIS — R06.09 DYSPNEA ON EXERTION: ICD-10-CM

## 2023-01-25 DIAGNOSIS — Z79.4 TYPE 2 DIABETES MELLITUS WITH HYPERGLYCEMIA, WITH LONG-TERM CURRENT USE OF INSULIN: ICD-10-CM

## 2023-01-25 LAB
BH CV ECHO MEAS - AO ROOT DIAM: 3.4 CM
BH CV ECHO MEAS - EDV(MOD-SP2): 48 ML
BH CV ECHO MEAS - EDV(MOD-SP4): 56 ML
BH CV ECHO MEAS - ESV(MOD-SP2): 16.6 ML
BH CV ECHO MEAS - ESV(MOD-SP4): 17.3 ML
BH CV ECHO MEAS - IVSD: 1.2 CM
BH CV ECHO MEAS - LA DIMENSION: 3.4 CM
BH CV ECHO MEAS - LAT PEAK E' VEL: 9 CM/SEC
BH CV ECHO MEAS - LVIDD: 4 CM
BH CV ECHO MEAS - LVIDS: 2.4 CM
BH CV ECHO MEAS - LVOT DIAM: 2.4 CM
BH CV ECHO MEAS - LVPWD: 1.3 CM
BH CV ECHO MEAS - MED PEAK E' VEL: 8.4 CM/SEC
BH CV ECHO MEAS - MV A MAX VEL: 120 CM/SEC
BH CV ECHO MEAS - MV DEC TIME: 103 MSEC
BH CV ECHO MEAS - MV E MAX VEL: 103 CM/SEC
BH CV ECHO MEAS - MV E/A: 0.9
BH CV ECHO MEAS - RVDD: 2.7 CM
BH CV ECHO MEASUREMENTS AVERAGE E/E' RATIO: 11.84
MAXIMAL PREDICTED HEART RATE: 138 BPM
STRESS TARGET HR: 117 BPM

## 2023-01-25 PROCEDURE — 93306 TTE W/DOPPLER COMPLETE: CPT

## 2023-01-25 PROCEDURE — 93306 TTE W/DOPPLER COMPLETE: CPT | Performed by: SPECIALIST

## 2023-01-26 ENCOUNTER — TELEPHONE (OUTPATIENT)
Dept: FAMILY MEDICINE CLINIC | Facility: CLINIC | Age: 83
End: 2023-01-26
Payer: MEDICARE

## 2023-01-26 DIAGNOSIS — Z79.4 TYPE 2 DIABETES MELLITUS WITH HYPERGLYCEMIA, WITH LONG-TERM CURRENT USE OF INSULIN: ICD-10-CM

## 2023-01-26 DIAGNOSIS — E11.65 TYPE 2 DIABETES MELLITUS WITH HYPERGLYCEMIA, WITH LONG-TERM CURRENT USE OF INSULIN: ICD-10-CM

## 2023-01-26 RX ORDER — INSULIN GLARGINE 100 [IU]/ML
50 INJECTION, SOLUTION SUBCUTANEOUS EVERY EVENING
Qty: 1500 ML | Refills: 5 | Status: SHIPPED | OUTPATIENT
Start: 2023-01-26 | End: 2023-02-17 | Stop reason: SDUPTHER

## 2023-02-13 PROBLEM — E11.9 TYPE 2 DIABETES MELLITUS WITH HEMOGLOBIN A1C GOAL OF LESS THAN 7.0%: Status: ACTIVE | Noted: 2020-10-06

## 2023-02-13 PROBLEM — E78.5 HYPERLIPIDEMIA: Status: ACTIVE | Noted: 2021-06-22

## 2023-02-17 ENCOUNTER — OFFICE VISIT (OUTPATIENT)
Dept: FAMILY MEDICINE CLINIC | Facility: CLINIC | Age: 83
End: 2023-02-17
Payer: MEDICARE

## 2023-02-17 VITALS
OXYGEN SATURATION: 97 % | WEIGHT: 176 LBS | TEMPERATURE: 97.9 F | HEIGHT: 66 IN | RESPIRATION RATE: 16 BRPM | SYSTOLIC BLOOD PRESSURE: 148 MMHG | DIASTOLIC BLOOD PRESSURE: 90 MMHG | HEART RATE: 81 BPM | BODY MASS INDEX: 28.28 KG/M2

## 2023-02-17 DIAGNOSIS — R05.9 COUGH, UNSPECIFIED TYPE: Primary | ICD-10-CM

## 2023-02-17 DIAGNOSIS — R09.89 CHEST CONGESTION: ICD-10-CM

## 2023-02-17 DIAGNOSIS — E11.65 TYPE 2 DIABETES MELLITUS WITH HYPERGLYCEMIA, WITH LONG-TERM CURRENT USE OF INSULIN: ICD-10-CM

## 2023-02-17 DIAGNOSIS — Z79.4 TYPE 2 DIABETES MELLITUS WITH HYPERGLYCEMIA, WITH LONG-TERM CURRENT USE OF INSULIN: ICD-10-CM

## 2023-02-17 DIAGNOSIS — B96.89 BACTERIAL INFECTION OF RIGHT EAR: ICD-10-CM

## 2023-02-17 DIAGNOSIS — U07.1 COVID-19 VIRUS INFECTION: ICD-10-CM

## 2023-02-17 DIAGNOSIS — I10 PRIMARY HYPERTENSION: ICD-10-CM

## 2023-02-17 DIAGNOSIS — H66.91 BACTERIAL INFECTION OF RIGHT EAR: ICD-10-CM

## 2023-02-17 LAB
EXPIRATION DATE: ABNORMAL
INTERNAL CONTROL: ABNORMAL
Lab: ABNORMAL
SARS-COV-2 AG UPPER RESP QL IA.RAPID: DETECTED

## 2023-02-17 PROCEDURE — 87426 SARSCOV CORONAVIRUS AG IA: CPT | Performed by: FAMILY MEDICINE

## 2023-02-17 PROCEDURE — 99214 OFFICE O/P EST MOD 30 MIN: CPT | Performed by: FAMILY MEDICINE

## 2023-02-17 RX ORDER — AMOXICILLIN 875 MG/1
875 TABLET, COATED ORAL 2 TIMES DAILY
Qty: 20 TABLET | Refills: 0 | Status: SHIPPED | OUTPATIENT
Start: 2023-02-17 | End: 2023-02-27

## 2023-02-17 RX ORDER — INSULIN GLARGINE 100 [IU]/ML
50 INJECTION, SOLUTION SUBCUTANEOUS EVERY EVENING
Qty: 1500 ML | Refills: 5 | Status: SHIPPED | OUTPATIENT
Start: 2023-02-17 | End: 2023-03-27 | Stop reason: SDUPTHER

## 2023-02-17 NOTE — PROGRESS NOTES
Chief Complaint  Chief Complaint   Patient presents with   • Hypertension   • Cough     Granddaughter had flu   • URI       HPI:  Sabi Haynes presents to [facility]     Pt reports she had a fever before and has not been able to eat until Wednesday.  Pt has tested positive for COVID.  Pt reports her symptoms started on the 9th of February and she is not sure who had it but she went to a birthday party on the 8th and the next day they were all sick.  Pt reports she has been in bed and nursed herself.     DM Type 2- pt last Hgb A1c was 8.90 which was well controlled.  Currently compliant with medication regimen and will continue as prescribed.     HTN- Pt BP today is 148/90 which is well controlled.  Pt is compliant with their medication and will continue their current medication regimen. No side effects to the medication.    Pt reports she cut her finger and went to [employer] and she was concerned because her baby brother stepped on a nail and did not go to the doctor.    Hypothyroidism- pt reports her thyroid pill is causing her to itch and it only started once she started the 125mcg dose and she reports she gets it from [facility] and it says synthroid. We are going to hold it for now and check on her levels and condition again at our next appointment.     Procedures     Past History:  Medical History: has a past medical history of Acid reflux, Allergic (2002), Arthritis, Bladder disorder, Bladder paralysis, Blind spot scotoma, left, Condition not found, Coronary artery disease (1975), DDD (degenerative disc disease), cervical, DDD (degenerative disc disease), lumbar, Diabetes mellitus (HCC), Gastric ulcer, Hearing loss, Heart attack (HCC), Heart disease, Hemorrhoids, History of confusion, History of migraine headaches, Hyperlipemia, Hyperlipidemia, Hypothyroidism, Incontinence of bowel, Incontinence of urine, Kidney disease, Left hand pain, Limb swelling, Macular degeneration,  PONV (postoperative nausea and vomiting), Primary osteoarthritis of right knee (08/24/2018), Recent total retinal detachment, Renal calculi, Renal insufficiency (1965), Right BBB/left ant fasc block, Ringing in ears, Rotator cuff tear (06/17/2021), S/P arthroscopic partial medial meniscectomy (12/08/2017), S/P medial meniscectomy of right knee (10/11/2017), Seasonal allergies, Self-catheterizes urinary bladder, Short-term memory loss, Shortness of breath, Spinal stenosis, Stroke (HCC), Thyroid disorder, and Vitamin D deficiency.   Surgical History: has a past surgical history that includes Varicose vein surgery; Cholecystectomy; Hysterectomy; Laparoscopic tubal ligation; Tear duct surgery; Extracorporeal shock wave lithotripsy (Left, 9/13/2019); Other surgical history; Bladder surgery (2002); Carpal tunnel release; Colonoscopy; Cyst Removal; Gallbladder surgery; Total hip arthroplasty (2010); Hysterectomy (1969); Knee surgery (08/22); Eye surgery; Joint replacement; and Shoulder arthroscopy w/ rotator cuff repair (Right, 6/17/2021).   Family History: family history includes Arthritis in her father and mother; Cancer in her father; Diabetes in her mother; Heart disease in her mother; Leukemia in her father; Osteoporosis in her brother; Stroke in her son.   Social History: reports that she has never smoked. She has never been exposed to tobacco smoke. She has never used smokeless tobacco. She reports that she does not drink alcohol and does not use drugs.  Immunization History   Administered Date(s) Administered   • COVID-19 (PFIZER) PURPLE CAP 05/14/2021, 06/04/2021   • Covid-19 (Pfizer) Gray Cap 01/19/2022   • PPD Test 06/22/2021   • Tdap 04/11/2017, 08/16/2022         Allergies: Atorvastatin, Ibuprofen, Aspirin, Atenolol, Digoxin, Latex, Meperidine, Metformin, and Morphine     Medications:  Current Outpatient Medications on File Prior to Visit   Medication Sig Dispense Refill   • Alcohol Swabs (Easy Touch Alcohol  "Prep Medium) 70 % pads 1 application by Epidural route 3 (Three) Times a Day. 100 each 5   • Ascorbic Acid 100 MG chewable tablet   Oral, MoWeFr, 0 Refill(s)     • benzonatate (TESSALON) 100 MG capsule Take 1 capsule by mouth 3 (Three) Times a Day As Needed for Cough. 30 capsule 0   • Cholecalciferol (VITAMIN D3) 5000 units capsule capsule Take 5,000 Units by mouth Daily.     • fluticasone (FLONASE) 50 MCG/ACT nasal spray 2 sprays into the nostril(s) as directed by provider Daily. 18.2 mL 0   • glucose blood (FREESTYLE LITE) test strip      • Lancets (freestyle) lancets Prick finger to check blood sugars up to 3 times a day 100 each 5   • levothyroxine (SYNTHROID, LEVOTHROID) 125 MCG tablet Take 1 tablet by mouth Daily for 90 days. 90 tablet 0   • vitamin B-12 (CYANOCOBALAMIN) 500 MCG tablet Take 500 mcg by mouth Daily.       Current Facility-Administered Medications on File Prior to Visit   Medication Dose Route Frequency Provider Last Rate Last Admin   • cyanocobalamin injection 1,000 mcg  1,000 mcg Intramuscular Q30 Days Drea Villalpando MD   1,000 mcg at 12/02/22 1725        Health Maintenance Due   Topic Date Due   • DXA SCAN  Never done   • Pneumococcal Vaccine 65+ (1 - PCV) Never done   • ZOSTER VACCINE (1 of 2) Never done   • ANNUAL WELLNESS VISIT  Never done   • LIPID PANEL  09/26/2020   • URINE MICROALBUMIN  09/27/2020   • DIABETIC EYE EXAM  02/04/2021   • INFLUENZA VACCINE  Never done       Vital Signs:   Vitals:    02/17/23 1519   BP: 148/90   BP Location: Left arm   Patient Position: Sitting   Pulse: 81   Resp: 16   Temp: 97.9 °F (36.6 °C)   SpO2: 97%   Weight: 79.8 kg (176 lb)   Height: 167.6 cm (66\")      Body mass index is 28.41 kg/m².     ROS:  Review of Systems   Constitutional: Negative for fatigue and fever.   HENT: Negative for congestion, ear pain and sinus pressure.    Respiratory: Negative for cough, chest tightness and shortness of breath.    Cardiovascular: Negative for chest pain, " palpitations and leg swelling.   Gastrointestinal: Negative for abdominal pain and diarrhea.   Genitourinary: Negative for dysuria and frequency.   Neurological: Negative for speech difficulty, headache and confusion.   Psychiatric/Behavioral: Negative for agitation and behavioral problems.          Physical Exam  Vitals reviewed.   Constitutional:       Appearance: Normal appearance.   HENT:      Right Ear: Tympanic membrane normal.      Left Ear: Tympanic membrane normal.      Nose: Nose normal.   Eyes:      Extraocular Movements: Extraocular movements intact.      Conjunctiva/sclera: Conjunctivae normal.      Pupils: Pupils are equal, round, and reactive to light.   Cardiovascular:      Rate and Rhythm: Normal rate and regular rhythm.   Pulmonary:      Effort: Pulmonary effort is normal.      Breath sounds: Normal breath sounds.   Abdominal:      General: Bowel sounds are normal.   Musculoskeletal:         General: Normal range of motion.      Cervical back: Normal range of motion.   Skin:     General: Skin is warm and dry.   Neurological:      General: No focal deficit present.      Mental Status: She is alert and oriented to person, place, and time.   Psychiatric:         Mood and Affect: Mood normal.         Behavior: Behavior normal.          Result Review   UC with Kailyn Lenz MD (01/13/2023)       Diagnoses and all orders for this visit:    1. Cough, unspecified type (Primary)  -     POCT SARS-CoV-2 Antigen VIVIAN    2. Chest congestion  -     POCT SARS-CoV-2 Antigen VIVIAN    3. Primary hypertension    4. Type 2 diabetes mellitus with hyperglycemia, with long-term current use of insulin (HCC)  -     Insulin Glargine (Lantus SoloStar) 100 UNIT/ML injection pen; Inject 50 Units under the skin into the appropriate area as directed Every Evening for 30 days.  Dispense: 1500 mL; Refill: 5    5. COVID-19 virus infection    6. Bacterial infection of right ear  -     amoxicillin (AMOXIL) 875 MG tablet; Take 1  tablet by mouth 2 (Two) Times a Day for 10 days.  Dispense: 20 tablet; Refill: 0          Smoking Cessation:    Sabi Haynes  reports that she has never smoked. She has never been exposed to tobacco smoke. She has never used smokeless tobacco.        Follow Up   Return in about 3 months (around 5/17/2023).  Patient was given instructions and counseling regarding her condition or for health maintenance advice. Please see specific information pulled into the AVS if appropriate.       Drea Villalpando MD

## 2023-03-21 RX ORDER — LANCETS 28 GAUGE
EACH MISCELLANEOUS
Qty: 100 EACH | Refills: 5 | Status: CANCELLED | OUTPATIENT
Start: 2023-03-21

## 2023-03-21 RX ORDER — LANCETS 28 GAUGE
EACH MISCELLANEOUS
Qty: 100 EACH | Refills: 5 | Status: SHIPPED | OUTPATIENT
Start: 2023-03-21

## 2023-03-21 NOTE — TELEPHONE ENCOUNTER
Caller: Sabi Haynes    Relationship: Self    Best call back number: [phone]    Requested Prescriptions:   Requested Prescriptions     Pending Prescriptions Disp Refills   • Lancets (freestyle) lancets 100 each 5     Sig: Prick finger to check blood sugars up to 3 times a day        Pharmacy where request should be sent: [facility] - [place], [place] - [address] - [phone] [facility] [phone]      Last office visit with prescribing clinician: 2/17/2023   Last telemedicine visit with prescribing clinician: 3/27/2023   Next office visit with prescribing clinician: 3/27/2023     Additional details provided by patient: PATIENT STATES THAT SHE NEEDS A 90 DAY SUPPLY OF THE LANCETS. PATIENT IS COMPLETELY OUT OF THE LANCETS.   PATIENT STATES THAT PHARMACY WILL NOT REFILL LANCETS UNTIL Monday 03-  Does the patient have less than a 3 day supply:  [x] Yes  [] No    Would you like a call back once the refill request has been completed: [] Yes [x] No    If the office needs to give you a call back, can they leave a voicemail: [x] Yes [] No    Suzanne San Rep   03/21/23 15:06 EDT

## 2023-03-27 ENCOUNTER — OFFICE VISIT (OUTPATIENT)
Dept: FAMILY MEDICINE CLINIC | Facility: CLINIC | Age: 83
End: 2023-03-27
Payer: MEDICARE

## 2023-03-27 VITALS
OXYGEN SATURATION: 99 % | SYSTOLIC BLOOD PRESSURE: 142 MMHG | HEART RATE: 83 BPM | DIASTOLIC BLOOD PRESSURE: 84 MMHG | HEIGHT: 66 IN | WEIGHT: 181 LBS | TEMPERATURE: 98 F | BODY MASS INDEX: 29.09 KG/M2

## 2023-03-27 DIAGNOSIS — Z79.4 TYPE 2 DIABETES MELLITUS WITH HYPERGLYCEMIA, WITH LONG-TERM CURRENT USE OF INSULIN: ICD-10-CM

## 2023-03-27 DIAGNOSIS — E11.65 TYPE 2 DIABETES MELLITUS WITH HYPERGLYCEMIA, WITH LONG-TERM CURRENT USE OF INSULIN: ICD-10-CM

## 2023-03-27 DIAGNOSIS — I83.93 ASYMPTOMATIC VARICOSE VEINS OF BOTH LOWER EXTREMITIES: Primary | ICD-10-CM

## 2023-03-27 PROCEDURE — 99214 OFFICE O/P EST MOD 30 MIN: CPT | Performed by: FAMILY MEDICINE

## 2023-03-27 PROCEDURE — 3079F DIAST BP 80-89 MM HG: CPT | Performed by: FAMILY MEDICINE

## 2023-03-27 PROCEDURE — 3077F SYST BP >= 140 MM HG: CPT | Performed by: FAMILY MEDICINE

## 2023-03-27 RX ORDER — INSULIN GLARGINE 100 [IU]/ML
50 INJECTION, SOLUTION SUBCUTANEOUS EVERY EVENING
Qty: 45 ML | Refills: 0
Start: 2023-03-27 | End: 2023-06-25

## 2023-03-27 NOTE — PROGRESS NOTES
Chief Complaint  Chief Complaint   Patient presents with   • Fatigue     Since having covid   • Cough     Cough with mucus   • Droppy eye lids   • Med Refill     Insulin - wants 3 months supply at a time       HPI:  Sabi Haynes presents to [facility]     DM Type 2- pt last Hgb A1c was 8.9 which was not as well controlled.  Currently compliant with medication regimen and will continue as prescribed.     Varicose veins- pt remembers going to Dr Bradford who suggested she goes and be seen with pain management for her low back pain.  Pt is concerned about her varicose veins which she would like to see someone about because she feels there is something that can be done.     Pt feels she is over the covid and is doing much better.     Procedures     Past History:  Medical History: has a past medical history of Acid reflux, Allergic (2002), Arthritis, Bladder disorder, Bladder paralysis, Blind spot scotoma, left, Condition not found, Coronary artery disease (1975), DDD (degenerative disc disease), cervical, DDD (degenerative disc disease), lumbar, Diabetes mellitus, Gastric ulcer, Hearing loss, Heart attack, Heart disease, Hemorrhoids, History of confusion, History of migraine headaches, Hyperlipemia, Hyperlipidemia, Hypothyroidism, Incontinence of bowel, Incontinence of urine, Kidney disease, Left hand pain, Limb swelling, Macular degeneration, PONV (postoperative nausea and vomiting), Primary osteoarthritis of right knee (08/24/2018), Recent total retinal detachment, Renal calculi, Renal insufficiency (1965), Right BBB/left ant fasc block, Ringing in ears, Rotator cuff tear (06/17/2021), S/P arthroscopic partial medial meniscectomy (12/08/2017), S/P medial meniscectomy of right knee (10/11/2017), Seasonal allergies, Self-catheterizes urinary bladder, Short-term memory loss, Shortness of breath, Spinal stenosis, Stroke, Thyroid disorder, and Vitamin D deficiency.   Surgical History: has  a past surgical history that includes Varicose vein surgery; Cholecystectomy; Hysterectomy; Laparoscopic tubal ligation; Tear duct surgery; Extracorporeal shock wave lithotripsy (Left, 9/13/2019); Other surgical history; Bladder surgery (2002); Carpal tunnel release; Colonoscopy; Cyst Removal; Gallbladder surgery; Total hip arthroplasty (2010); Hysterectomy (1969); Knee surgery (08/22); Eye surgery; Joint replacement; and Shoulder arthroscopy w/ rotator cuff repair (Right, 6/17/2021).   Family History: family history includes Arthritis in her father and mother; Cancer in her father; Diabetes in her mother; Heart disease in her mother; Leukemia in her father; Osteoporosis in her brother; Stroke in her son.   Social History: reports that she has never smoked. She has never been exposed to tobacco smoke. She has never used smokeless tobacco. She reports that she does not drink alcohol and does not use drugs.  Immunization History   Administered Date(s) Administered   • COVID-19 (PFIZER) PURPLE CAP 05/14/2021, 06/04/2021   • Covid-19 (Pfizer) Gray Cap 01/19/2022   • PPD Test 06/22/2021   • Tdap 04/11/2017, 08/16/2022         Allergies: Atorvastatin, Ibuprofen, Aspirin, Atenolol, Digoxin, Latex, Meperidine, Metformin, and Morphine     Medications:  Current Outpatient Medications on File Prior to Visit   Medication Sig Dispense Refill   • Alcohol Swabs (Easy Touch Alcohol Prep Medium) 70 % pads 1 application by Epidural route 3 (Three) Times a Day. 100 each 5   • Ascorbic Acid 100 MG chewable tablet   Oral, MoWeFr, 0 Refill(s)     • Cholecalciferol (VITAMIN D3) 5000 units capsule capsule Take 1 capsule by mouth Daily.     • glucose blood (FREESTYLE LITE) test strip      • Lancets (freestyle) lancets Prick finger to check blood sugars up to 3 times a day 100 each 5   • vitamin B-12 (CYANOCOBALAMIN) 500 MCG tablet Take 1 tablet by mouth Daily.     • benzonatate (TESSALON) 100 MG capsule Take 1 capsule by mouth 3 (Three)  "Times a Day As Needed for Cough. 30 capsule 0   • fluticasone (FLONASE) 50 MCG/ACT nasal spray 2 sprays into the nostril(s) as directed by provider Daily. 18.2 mL 0   • levothyroxine (SYNTHROID, LEVOTHROID) 125 MCG tablet Take 1 tablet by mouth Daily for 90 days. 90 tablet 0     Current Facility-Administered Medications on File Prior to Visit   Medication Dose Route Frequency Provider Last Rate Last Admin   • cyanocobalamin injection 1,000 mcg  1,000 mcg Intramuscular Q30 Days Drea Villalpando MD   1,000 mcg at 12/02/22 1725        Health Maintenance Due   Topic Date Due   • DXA SCAN  Never done   • Pneumococcal Vaccine 65+ (1 - PCV) Never done   • ZOSTER VACCINE (1 of 2) Never done   • ANNUAL WELLNESS VISIT  Never done   • LIPID PANEL  09/26/2020   • URINE MICROALBUMIN  09/27/2020   • DIABETIC EYE EXAM  02/04/2021       Vital Signs:   Vitals:    03/27/23 1655 03/27/23 1713   BP: 140/82 142/84   Pulse: 83    Temp: 98 °F (36.7 °C)    SpO2: 99%    Weight: 82.1 kg (181 lb)    Height: 167.6 cm (66\")       Body mass index is 29.21 kg/m².     ROS:  Review of Systems   Constitutional: Negative for fatigue and fever.   HENT: Negative for congestion, ear pain and sinus pressure.    Respiratory: Negative for cough, chest tightness and shortness of breath.    Cardiovascular: Negative for chest pain, palpitations and leg swelling.   Gastrointestinal: Negative for abdominal pain and diarrhea.   Genitourinary: Negative for dysuria and frequency.   Neurological: Negative for speech difficulty, headache and confusion.   Psychiatric/Behavioral: Negative for agitation and behavioral problems.          Physical Exam  Vitals reviewed.   Constitutional:       Appearance: Normal appearance.   HENT:      Right Ear: Tympanic membrane normal.      Left Ear: Tympanic membrane normal.      Nose: Nose normal.   Eyes:      Extraocular Movements: Extraocular movements intact.      Conjunctiva/sclera: Conjunctivae normal.      Pupils: Pupils are " equal, round, and reactive to light.   Cardiovascular:      Rate and Rhythm: Normal rate and regular rhythm.   Pulmonary:      Effort: Pulmonary effort is normal.      Breath sounds: Normal breath sounds.   Abdominal:      General: Bowel sounds are normal.   Musculoskeletal:         General: Normal range of motion.      Cervical back: Normal range of motion.   Skin:     General: Skin is warm and dry.   Neurological:      General: No focal deficit present.      Mental Status: She is alert and oriented to person, place, and time.   Psychiatric:         Mood and Affect: Mood normal.         Behavior: Behavior normal.          Result Review   Comprehensive Metabolic Panel (10/27/2022 15:22)  Hemoglobin A1c (10/27/2022 15:22)  Iron (10/27/2022 15:22)  TSH (10/27/2022 15:22)       Diagnoses and all orders for this visit:    1. Asymptomatic varicose veins of both lower extremities (Primary)    2. Type 2 diabetes mellitus with hyperglycemia, with long-term current use of insulin  -     Insulin Glargine (Lantus SoloStar) 100 UNIT/ML injection pen; Inject 50 Units under the skin into the appropriate area as directed Every Evening for 90 days.  Dispense: 45 mL; Refill: 0          Smoking Cessation:    Sabi Haynes  reports that she has never smoked. She has never been exposed to tobacco smoke. She has never used smokeless tobacco.          Follow Up   Return in about 3 months (around 6/27/2023).  Patient was given instructions and counseling regarding her condition or for health maintenance advice. Please see specific information pulled into the AVS if appropriate.       Drea Villalpando MD

## 2023-04-07 ENCOUNTER — OFFICE VISIT (OUTPATIENT)
Dept: CARDIOLOGY | Facility: CLINIC | Age: 83
End: 2023-04-07
Payer: MEDICARE

## 2023-04-07 VITALS
HEART RATE: 77 BPM | WEIGHT: 183 LBS | HEIGHT: 66 IN | BODY MASS INDEX: 29.41 KG/M2 | DIASTOLIC BLOOD PRESSURE: 88 MMHG | SYSTOLIC BLOOD PRESSURE: 140 MMHG

## 2023-04-07 DIAGNOSIS — I45.10 RBBB: Primary | ICD-10-CM

## 2023-04-07 DIAGNOSIS — I10 PRIMARY HYPERTENSION: ICD-10-CM

## 2023-04-07 PROCEDURE — 1160F RVW MEDS BY RX/DR IN RCRD: CPT | Performed by: INTERNAL MEDICINE

## 2023-04-07 PROCEDURE — 93000 ELECTROCARDIOGRAM COMPLETE: CPT | Performed by: INTERNAL MEDICINE

## 2023-04-07 PROCEDURE — 99203 OFFICE O/P NEW LOW 30 MIN: CPT | Performed by: INTERNAL MEDICINE

## 2023-04-07 PROCEDURE — 3079F DIAST BP 80-89 MM HG: CPT | Performed by: INTERNAL MEDICINE

## 2023-04-07 PROCEDURE — 1159F MED LIST DOCD IN RCRD: CPT | Performed by: INTERNAL MEDICINE

## 2023-04-07 PROCEDURE — 3077F SYST BP >= 140 MM HG: CPT | Performed by: INTERNAL MEDICINE

## 2023-04-07 NOTE — PROGRESS NOTES
Chief Complaint  Coronary Artery Disease    Subjective            Sabi Haynes presents to [facility]  History of Present Illness    82-year-old white female.  She previously saw a cardiologist but it has been a few years.  She has a chronic right bundle branch block on EKG.  She has been evaluated for some palpitations in the past but no specific cardiac problems.  She was referred due to questionable coronary artery disease but based on my review of her records I see no evidence that has been established as a diagnosis for her.  She is very active, she denies chest pain palpitations or excessive shortness of breath.  She has a lot of gas and belching reported.  She is compliant with her medical therapy.    [facility]  Past Medical History:   Diagnosis Date   • Acid reflux    • Allergic 2002    Seasonal   • Arthritis    • Bladder disorder    • Bladder paralysis     DUE TO SPINAL STENOSIS   • Blind spot scotoma, left    • Condition not found     ulcer   • Coronary artery disease 1975    RBBB   • DDD (degenerative disc disease), cervical    • DDD (degenerative disc disease), lumbar    • Diabetes mellitus    • Gastric ulcer    • Hearing loss    • Heart attack     DENIES CP BUT GETS SOA WITH EXERTION . OCCURRED IN THE 1970'S. SAW DR BAUTISTA IN [place]   • Heart disease    • Hemorrhoids    • History of confusion    • History of migraine headaches    • Hyperlipemia    • Hyperlipidemia    • Hypothyroidism    • Incontinence of bowel     DUE TO SPINAL STENOSIS   • Incontinence of urine     DUE TO SPINAL STENOSIS   • Kidney disease    • Left hand pain    • Limb swelling    • Macular degeneration    • PONV (postoperative nausea and vomiting)    • Primary osteoarthritis of right knee 08/24/2018   • Recent total retinal detachment    • Renal calculi    • Renal insufficiency 1965   • Right BBB/left ant fasc block    • Ringing in ears    • Rotator cuff tear 06/17/2021   • S/P arthroscopic partial  medial meniscectomy 12/08/2017    right knee   • S/P medial meniscectomy of right knee 10/11/2017   • Seasonal allergies    • Self-catheterizes urinary bladder     DUE TO SPINAL STENOSIS   • Short-term memory loss     MILD   • Shortness of breath    • Spinal stenosis    • Stroke     no residual   • Thyroid disorder    • Vitamin D deficiency          SURGICALHX  Past Surgical History:   Procedure Laterality Date   • BLADDER SURGERY  2002   • CARPAL TUNNEL RELEASE     • CHOLECYSTECTOMY     • COLONOSCOPY     • CYST REMOVAL     • EXTRACORPOREAL SHOCK WAVE LITHOTRIPSY (ESWL) Left 9/13/2019    Procedure: EXTRACORPOREAL SHOCKWAVE LITHOTRIPSY;  Surgeon: Pipe Pat MD;  [facility] OR [facility];  Service: Urology   • EYE SURGERY      CATARACT SURGERY   • GALLBLADDER SURGERY     • HYSTERECTOMY     • HYSTERECTOMY  1969   • JOINT REPLACEMENT      joint surgery   • KNEE SURGERY  08/22   • LAPAROSCOPIC TUBAL LIGATION     • OTHER SURGICAL HISTORY      Artificial joint/limbs   • SHOULDER ARTHROSCOPY W/ ROTATOR CUFF REPAIR Right 6/17/2021    Procedure: RIGHT SHOULDER ARTHROSCOPY, SUBACROMIAL DECOMPRESSION, DISTAL CLAVICULECTOMY, ROTATOR CUFF REPAIR;  Surgeon: Henri Quintero MD;  Location: [facility];  Service: Orthopedics;  Laterality: Right;   • TEAR DUCT SURGERY     • TOTAL HIP ARTHROPLASTY  2010    HAS HAD BOTH HIPS REPLACED   • VARICOSE VEIN SURGERY          SOC  Social History     Socioeconomic History   • Marital status:    Tobacco Use   • Smoking status: Never     Passive exposure: Never   • Smokeless tobacco: Never   Vaping Use   • Vaping Use: Never used   Substance and Sexual Activity   • Alcohol use: No   • Drug use: No   • Sexual activity: Defer         FAMHX  Family History   Problem Relation Age of Onset   • Heart disease Mother    • Diabetes Mother    • Arthritis Mother    • Cancer Father    • Leukemia Father    • Arthritis Father    • Osteoporosis Brother    • Stroke Son    • Malig Hyperthermia  "Neg Hx           ALLERGY  Allergies   Allergen Reactions   • Atorvastatin Other (See Comments)     BONE PAIN  Other reaction(s): Other (See Comments)  BONE PAIN   • Ibuprofen Other (See Comments)     HISTORY OF GI ULCERS  Other reaction(s): Other (See Comments)  HISTORY OF GI ULCERS   • Aspirin Other (See Comments), GI Intolerance and Unknown - Low Severity     \"MAKES ME LIGHTHEADED\"  Other reaction(s): GI Intolerance, Other (See Comments), Unknown - Low Severity  States she can only take asa 81mg. Cannot take \"antionette\" asa  \"MAKES ME LIGHTHEADED\"   • Atenolol Other (See Comments)     sleepy  Other reaction(s): Other (See Comments)  sleepy   • Digoxin Other (See Comments)     \"MAKES ME FALL ASLEEP\"  Other reaction(s): Other (See Comments)  \"MAKES ME FALL ASLEEP\"   • Latex Rash   • Meperidine Other (See Comments)     \"HARD TIME WAKING UP\"  Other reaction(s): Other (See Comments)  \"HARD TIME WAKING UP\"   • Metformin Diarrhea   • Morphine Other (See Comments)     \"HARD TIME WAKING UP\"  Other reaction(s): Other (See Comments)  \"HARD TIME WAKING UP\"        MEDSCURRENT    Current Outpatient Medications:   •  Alcohol Swabs (Easy Touch Alcohol Prep Medium) 70 % pads, 1 application by Epidural route 3 (Three) Times a Day., Disp: 100 each, Rfl: 5  •  Ascorbic Acid 100 MG chewable tablet,  Oral, MoWeFr, 0 Refill(s), Disp: , Rfl:   •  benzonatate (TESSALON) 100 MG capsule, Take 1 capsule by mouth 3 (Three) Times a Day As Needed for Cough., Disp: 30 capsule, Rfl: 0  •  Cholecalciferol (VITAMIN D3) 5000 units capsule capsule, Take 1 capsule by mouth Daily., Disp: , Rfl:   •  fluticasone (FLONASE) 50 MCG/ACT nasal spray, 2 sprays into the nostril(s) as directed by provider Daily., Disp: 18.2 mL, Rfl: 0  •  glucose blood (FREESTYLE LITE) test strip, , Disp: , Rfl:   •  Insulin Glargine (Lantus SoloStar) 100 UNIT/ML injection pen, Inject 50 Units under the skin into the appropriate area as directed Every Evening for 90 days., Disp: 45 " "mL, Rfl: 0  •  Insulin Pen Needle 31G X 5 MM misc, Use as directed, Disp: 100 each, Rfl: 3  •  Lancets (freestyle) lancets, Prick finger to check blood sugars up to 3 times a day, Disp: 100 each, Rfl: 5  •  vitamin B-12 (CYANOCOBALAMIN) 500 MCG tablet, Take 1 tablet by mouth Daily., Disp: , Rfl:   •  levothyroxine (SYNTHROID, LEVOTHROID) 125 MCG tablet, Take 1 tablet by mouth Daily for 90 days., Disp: 90 tablet, Rfl: 0    Current Facility-Administered Medications:   •  cyanocobalamin injection 1,000 mcg, 1,000 mcg, Intramuscular, Q30 Days, Drea Villalpando MD, 1,000 mcg at 12/02/22 1725      Review of Systems   Constitutional: Negative.   HENT: Negative.    Eyes: Negative.    Cardiovascular: Negative for chest pain and dyspnea on exertion.   Respiratory: Negative.  Negative for shortness of breath.    Endocrine: Negative.    Hematologic/Lymphatic: Negative.    Skin: Negative.    Musculoskeletal: Negative.    Genitourinary: Negative.    Neurological: Negative.    Psychiatric/Behavioral: Negative.         Objective     /88   Pulse 77   Ht 167.6 cm (66\")   Wt 83 kg (183 lb)   BMI 29.54 kg/m²       General Appearance:   · well developed  · well nourished  HENT:   · oropharynx moist  · lips not cyanotic  Neck:  · thyroid not enlarged  · supple  Respiratory:  · no respiratory distress  · normal breath sounds  · no rales  Cardiovascular:  · no jugular venous distention  · regular rhythm  · apical impulse normal  · S1 normal, S2 normal  · no S3, no S4   · no murmur  · no rub, no thrill  · carotid pulses normal; no bruit  · pedal pulses normal  · lower extremity edema: none    Musculoskeletal:  · no clubbing of fingers.   · normocephalic, head atraumatic  Skin:   · warm, dry  Psychiatric:  · judgement and insight appropriate  · normal mood and affect      Result Review :     The following data was reviewed by: Choco Wolfe MD on 04/07/2023:    CMP    CMP 10/27/22   Glucose 148 (A)   BUN 16 "   Creatinine 0.99   eGFR 57.4 (A)   Sodium 139   Potassium 4.6   Chloride 99   Calcium 9.7   Total Protein 7.6   Albumin 4.40   Globulin 3.2   Total Bilirubin 0.5   Alkaline Phosphatase 78   AST (SGOT) 19   ALT (SGPT) 13   Albumin/Globulin Ratio 1.4   BUN/Creatinine Ratio 16.2   Anion Gap 11.2   (A) Abnormal value       Comments are available for some flowsheets but are not being displayed.           CBC    CBC 10/27/22   WBC 5.85   RBC 4.95   Hemoglobin 14.5   Hematocrit 43.0   MCV 86.9   MCH 29.3   MCHC 33.7   RDW 14.4   Platelets 227               TSH    TSH 10/27/22   TSH 5.170 (A)   (A) Abnormal value              Data reviewed: Primary care records reviewed, echocardiogram in January showed normal biventricular function with trace mitral and tricuspid regurgitation       ECG 12 Lead    Date/Time: 4/7/2023 11:33 AM  Performed by: GRIS Wolfe MD  Authorized by: GRIS Wolfe MD   Comparison: compared with previous ECG   Similar to previous ECG  Comparison to previous ECG: No change compared to March 2022  Rhythm: sinus rhythm  Conduction: right bundle branch block  ST Segments: ST segments normal  T Waves: T waves normal  QRS axis: normal  Other: no other findings    Clinical impression: abnormal EKG                        Assessment and Plan        ASSESSMENT:  Encounter Diagnoses   Name Primary?   • RBBB Yes   • Primary hypertension          PLAN:    1.  Right bundle branch block-I discussed with the patient today that this is a chronic finding on her EKG and it does not mean she has a blocked artery.  She is having no symptoms to suggest underlying significant coronary artery disease.  I recommend usual primary care health maintenance but no additional cardiac testing is indicated at this time  2.  Primary hypertension- borderline to mildly elevated on recent medical encounters.  I told her to check her blood pressure at home a couple of times per week and keep a log of that so that her next primary  care visit this can be discussed to see if the overall average requires considering medication treatment of her hypertension  3.  She will be followed as needed in the future my office          Patient was given instructions and counseling regarding her condition or for health maintenance advice. Please see specific information pulled into the AVS if appropriate.             GRIS Wolfe MD  4/7/2023    11:32 EDT

## 2023-05-18 ENCOUNTER — TELEPHONE (OUTPATIENT)
Dept: FAMILY MEDICINE CLINIC | Facility: CLINIC | Age: 83
End: 2023-05-18

## 2023-05-18 ENCOUNTER — OFFICE VISIT (OUTPATIENT)
Dept: FAMILY MEDICINE CLINIC | Facility: CLINIC | Age: 83
End: 2023-05-18

## 2023-05-18 VITALS
HEIGHT: 66 IN | BODY MASS INDEX: 29.89 KG/M2 | TEMPERATURE: 98.7 F | WEIGHT: 186 LBS | OXYGEN SATURATION: 97 % | DIASTOLIC BLOOD PRESSURE: 78 MMHG | SYSTOLIC BLOOD PRESSURE: 128 MMHG | HEART RATE: 78 BPM

## 2023-05-18 DIAGNOSIS — R29.898 LEG WEAKNESS, BILATERAL: ICD-10-CM

## 2023-05-18 DIAGNOSIS — M54.16 LUMBAR RADICULOPATHY: ICD-10-CM

## 2023-05-18 DIAGNOSIS — I10 PRIMARY HYPERTENSION: ICD-10-CM

## 2023-05-18 DIAGNOSIS — E11.65 TYPE 2 DIABETES MELLITUS WITH HYPERGLYCEMIA, WITH LONG-TERM CURRENT USE OF INSULIN: Primary | ICD-10-CM

## 2023-05-18 DIAGNOSIS — Z79.4 TYPE 2 DIABETES MELLITUS WITH HYPERGLYCEMIA, WITH LONG-TERM CURRENT USE OF INSULIN: Primary | ICD-10-CM

## 2023-05-18 DIAGNOSIS — E78.2 MIXED HYPERLIPIDEMIA: ICD-10-CM

## 2023-05-18 PROCEDURE — 1160F RVW MEDS BY RX/DR IN RCRD: CPT | Performed by: FAMILY MEDICINE

## 2023-05-18 PROCEDURE — 1159F MED LIST DOCD IN RCRD: CPT | Performed by: FAMILY MEDICINE

## 2023-05-18 PROCEDURE — 99214 OFFICE O/P EST MOD 30 MIN: CPT | Performed by: FAMILY MEDICINE

## 2023-05-18 PROCEDURE — 3078F DIAST BP <80 MM HG: CPT | Performed by: FAMILY MEDICINE

## 2023-05-18 PROCEDURE — 3074F SYST BP LT 130 MM HG: CPT | Performed by: FAMILY MEDICINE

## 2023-05-18 RX ORDER — LEVOTHYROXINE SODIUM 112 UG/1
112 TABLET ORAL DAILY
Qty: 90 TABLET | Refills: 3 | Status: SHIPPED | OUTPATIENT
Start: 2023-05-18 | End: 2023-08-16

## 2023-05-18 RX ORDER — LEVOTHYROXINE SODIUM 112 UG/1
112 TABLET ORAL DAILY
COMMUNITY
End: 2023-05-18 | Stop reason: SDUPTHER

## 2023-05-18 NOTE — TELEPHONE ENCOUNTER
Patient was just seen in office and discussed pain management. At checkout, patient has decided to go ahead and do the shots that were talked about during appointment.

## 2023-05-18 NOTE — PROGRESS NOTES
"Chief Complaint  Chief Complaint   Patient presents with   • Fatigue     \"Legs don't want to work.  Feel like jello sometimes and tight other times\"   • Thyroid Problem     Patient restarted herself on levothyroxine 112mcg after being told to stop levothryoxine 125mcg due to hives   • Right ear pain       HPI:  Sabi Haynes presents to [facility]    The patient presents for follow-up.     The patient is not feeling well. She feels as though she has went downhill. Her bilateral legs do not feel right. They hurt, and she feels spasms. She feels spasms more when she is at rest. She had more energy after taking the medication for hives; however, after 2 weeks, she started hurting. She has a hard time getting up.     She went back on Synthroid, thinking it may help. She does not get hives with Synthroid; however, the other thyroid medication she was taking, which was green, gave her hives. She discontinued this one on 04/17/2023 because she had COVID-19, and she is tired all the time. She takes her vitamins and eats well; however, she thinks she eats too much. She has taken 5 pills of Synthroid. She has a raspy voice all the time now.     She had shoulder pain, and could not get her clothes on and off without crying. She would have edema, and was very painful. Her bilateral knees hurt as well. She went to Dr. Toledo. He took x-rays, and told her they were fine. He operated on her knee; however, her other one is hurting still, and she is not ready for surgery. She claims \"they feel like jello.\" She is exhausted from her diagnosis of COVID-19, and she has not regained her energy. She is normally full of energy. She has to rest a lot. She is unsure if she will need the wheelchair again that she used with her hip replacement, to get going again. She is unable to do yard work like she used to before COVID-19. She does not feel that she has neuropathy. She does have bad varicose veins. She " feels that she does not have good circulation. Her sciatic nerve is still sore from a fall that happened previously.     She went to Dr. Bradford, and had a stimulator that she was wearing on the outside for her kidneys. When she had surgery, they put the stimulator in, and she was in pain. Her first one was located higher, in the back region. She has to turn her stimulator off to urinate. She wears diapers at night, and she urinates a lot. She does fine during the day. She feels when her body relaxes, it is easier for her body to urinate. Dr. Bradford wanted the patient to go to pain management; however, she declined thinking he meant pain medication. He actually wanted her to get an injection her spine. She declined this as well. She does not understand why she has to turn the stimulator off to urinate. She seen a therapist who placed a vaginal ring, and was told that if she felt a pinch it was normal. The device did not help her urinate; however, it helped her have a bowel movement. The patient does not drive, and depends on her granddaughter, so has not made a follow-up appointment.     She does have lipomas all over her body. She went to Dr. Lakhani, and he cut out two lipomas on the left arm.     The patient had an EKG at [facility], and her results showed a right bundle branch block; however, they repeated it, and did not see it.     The patient has not has an updated mammogram because she has lipomas underneath her right breast, and is afraid the mammography machine would cause pain.     The patient is having vision issues. She has to sit very close to the television to see people's faces on the screens. She has not had a diabetic eye exam in quite a while.     She has hearing aids, and claims that her ear canals are so small, which makes the hearing aids not work like they should.     She needs refills on her pen needles and alcohol pads for her insulin, and Synthroid.       Procedures     Past  History:  Medical History: has a past medical history of Acid reflux, Allergic (2002), Arthritis, Bladder disorder, Bladder paralysis, Blind spot scotoma, left, Condition not found, Coronary artery disease (1975), DDD (degenerative disc disease), cervical, DDD (degenerative disc disease), lumbar, Diabetes mellitus, Gastric ulcer, Hearing loss, Heart attack, Heart disease, Hemorrhoids, History of confusion, History of migraine headaches, Hyperlipemia, Hyperlipidemia, Hypothyroidism, Incontinence of bowel, Incontinence of urine, Kidney disease, Left hand pain, Limb swelling, Macular degeneration, PONV (postoperative nausea and vomiting), Primary osteoarthritis of right knee (08/24/2018), Recent total retinal detachment, Renal calculi, Renal insufficiency (1965), Right BBB/left ant fasc block, Ringing in ears, Rotator cuff tear (06/17/2021), S/P arthroscopic partial medial meniscectomy (12/08/2017), S/P medial meniscectomy of right knee (10/11/2017), Seasonal allergies, Self-catheterizes urinary bladder, Short-term memory loss, Shortness of breath, Spinal stenosis, Stroke, Thyroid disorder, and Vitamin D deficiency.   Surgical History: has a past surgical history that includes Varicose vein surgery; Cholecystectomy; Hysterectomy; Laparoscopic tubal ligation; Tear duct surgery; Extracorporeal shock wave lithotripsy (Left, 9/13/2019); Other surgical history; Bladder surgery (2002); Carpal tunnel release; Colonoscopy; Cyst Removal; Gallbladder surgery; Total hip arthroplasty (2010); Hysterectomy (1969); Knee surgery (08/22); Eye surgery; Joint replacement; and Shoulder arthroscopy w/ rotator cuff repair (Right, 6/17/2021).   Family History: family history includes Arthritis in her father and mother; Cancer in her father; Diabetes in her mother; Heart disease in her mother; Leukemia in her father; Osteoporosis in her brother; Stroke in her son.   Social History: reports that she has never smoked. She has never been exposed  to tobacco smoke. She has never used smokeless tobacco. She reports that she does not drink alcohol and does not use drugs.  Immunization History   Administered Date(s) Administered   • PPD Test 06/22/2021   • Tdap 04/11/2017, 08/16/2022         Allergies: Atorvastatin, Ibuprofen, Aspirin, Atenolol, Digoxin, Latex, Meperidine, Metformin, and Morphine     Medications:  Current Outpatient Medications on File Prior to Visit   Medication Sig Dispense Refill   • Alcohol Swabs (Easy Touch Alcohol Prep Medium) 70 % pads 1 application by Epidural route 3 (Three) Times a Day. 100 each 5   • Cholecalciferol (VITAMIN D3) 5000 units capsule capsule Take 1 capsule by mouth Daily.     • fluticasone (FLONASE) 50 MCG/ACT nasal spray 2 sprays into the nostril(s) as directed by provider Daily. 18.2 mL 0   • glucose blood (FREESTYLE LITE) test strip      • Insulin Glargine (Lantus SoloStar) 100 UNIT/ML injection pen Inject 50 Units under the skin into the appropriate area as directed Every Evening for 90 days. 45 mL 0   • Insulin Pen Needle 31G X 5 MM misc Use as directed 100 each 3   • Lancets (freestyle) lancets Prick finger to check blood sugars up to 3 times a day 100 each 5   • vitamin B-12 (CYANOCOBALAMIN) 500 MCG tablet Take 1 tablet by mouth Daily.     • Ascorbic Acid 100 MG chewable tablet   Oral, MoWeFr, 0 Refill(s) (Patient not taking: Reported on 5/18/2023)       Current Facility-Administered Medications on File Prior to Visit   Medication Dose Route Frequency Provider Last Rate Last Admin   • cyanocobalamin injection 1,000 mcg  1,000 mcg Intramuscular Q30 Days Drea Villalpando MD   1,000 mcg at 12/02/22 1725        Health Maintenance Due   Topic Date Due   • DXA SCAN  Never done   • Pneumococcal Vaccine 65+ (1 - PCV) Never done   • ZOSTER VACCINE (1 of 2) Never done   • ANNUAL WELLNESS VISIT  Never done   • URINE MICROALBUMIN  09/27/2020   • DIABETIC EYE EXAM  02/04/2021       Vital Signs:   Vitals:    05/18/23 1601  "  BP: 128/78   Pulse: 78   Temp: 98.7 °F (37.1 °C)   SpO2: 97%   Weight: 84.4 kg (186 lb)   Height: 167.6 cm (66\")      Body mass index is 30.02 kg/m².     ROS:  Review of Systems   Constitutional: Negative for fatigue and fever.   HENT: Negative for congestion, ear pain and sinus pressure.    Respiratory: Negative for cough, chest tightness and shortness of breath.    Cardiovascular: Negative for chest pain, palpitations and leg swelling.   Gastrointestinal: Negative for abdominal pain and diarrhea.   Genitourinary: Negative for dysuria and frequency.   Neurological: Negative for speech difficulty, headache and confusion.   Psychiatric/Behavioral: Negative for agitation and behavioral problems.          Physical Exam  Vitals reviewed.   Constitutional:       Appearance: Normal appearance.   HENT:      Right Ear: Tympanic membrane normal.      Left Ear: Tympanic membrane normal.      Nose: Nose normal.   Eyes:      Extraocular Movements: Extraocular movements intact.      Conjunctiva/sclera: Conjunctivae normal.      Pupils: Pupils are equal, round, and reactive to light.   Cardiovascular:      Rate and Rhythm: Normal rate and regular rhythm.   Pulmonary:      Effort: Pulmonary effort is normal.      Breath sounds: Normal breath sounds.   Abdominal:      General: Bowel sounds are normal.   Musculoskeletal:         General: Normal range of motion.      Cervical back: Normal range of motion.   Skin:     General: Skin is warm and dry.   Neurological:      General: No focal deficit present.      Mental Status: She is alert and oriented to person, place, and time.   Psychiatric:         Mood and Affect: Mood normal.         Behavior: Behavior normal.          Result Review   MRI Lumbar Spine Wo Con (04/16/2022 12:35)  Comprehensive Metabolic Panel (10/27/2022 15:22)  Hemoglobin A1c (10/27/2022 15:22)  Iron (10/27/2022 15:22)  CBC Auto Differential (10/27/2022 15:22)  Adult Transthoracic Echo Complete W/ Cont if Necessary " Per Protocol (01/25/2023 16:14)       Diagnoses and all orders for this visit:    1. Type 2 diabetes mellitus with hyperglycemia, with long-term current use of insulin (Primary)  -     Comprehensive Metabolic Panel  -     Hemoglobin A1c    2. Primary hypertension    3. Leg weakness, bilateral    4. Lumbar radiculopathy    5. Mixed hyperlipidemia  -     Lipid Panel    Other orders  -     levothyroxine (SYNTHROID, LEVOTHROID) 112 MCG tablet; Take 1 tablet by mouth Daily for 90 days.  Dispense: 90 tablet; Refill: 3    1. Bilateral leg pain  - The patient will have a laboratory work-up while fasting at her convenience.    2. Diabetes mellitus  - The patient will receive refills of her pen needles and alcohol swabs.     3. Hyperthyroidism  - The patient will receive refills of Synthroid 112 mcg.         Pt refuses to get anymore mammograms.    Smoking Cessation:    Sabi Haynes  reports that she has never smoked. She has never been exposed to tobacco smoke. She has never used smokeless tobacco.     I spent 40 minutes caring for Sabi on this date of service. This time includes time spent by me in the following activities: reviewing tests     Follow Up   Return in about 3 months (around 8/18/2023).  Patient was given instructions and counseling regarding her condition or for health maintenance advice. Please see specific information pulled into the AVS if appropriate.       Drea Villalpando MD     Transcribed from ambient dictation for Drea Villalpando MD by Chantale Huffman.  05/18/23   23:17 EDT    Patient or patient representative verbalized consent to the visit recording.  I have personally performed the services described in this document as transcribed by the above individual, and it is both accurate and complete.

## 2023-05-19 ENCOUNTER — CLINICAL SUPPORT (OUTPATIENT)
Dept: FAMILY MEDICINE CLINIC | Facility: CLINIC | Age: 83
End: 2023-05-19
Payer: MEDICARE

## 2023-05-19 DIAGNOSIS — E78.5 HYPERLIPIDEMIA, UNSPECIFIED HYPERLIPIDEMIA TYPE: ICD-10-CM

## 2023-05-19 LAB
CHOLEST SERPL-MCNC: 307 MG/DL (ref 0–200)
HBA1C MFR BLD: 8.7 % (ref 4.8–5.6)
HDLC SERPL-MCNC: 37 MG/DL (ref 40–60)
LDLC SERPL CALC-MCNC: 232 MG/DL (ref 0–100)
LDLC/HDLC SERPL: 6.26 {RATIO}
TRIGL SERPL-MCNC: 191 MG/DL (ref 0–150)
VLDLC SERPL-MCNC: 38 MG/DL (ref 5–40)

## 2023-05-19 PROCEDURE — 83036 HEMOGLOBIN GLYCOSYLATED A1C: CPT | Performed by: FAMILY MEDICINE

## 2023-05-19 PROCEDURE — 80053 COMPREHEN METABOLIC PANEL: CPT | Performed by: FAMILY MEDICINE

## 2023-05-19 PROCEDURE — 80061 LIPID PANEL: CPT | Performed by: FAMILY MEDICINE

## 2023-05-22 LAB
ALBUMIN SERPL-MCNC: 4.4 G/DL (ref 3.5–5.2)
ALBUMIN/GLOB SERPL: 1.4 G/DL
ALP SERPL-CCNC: 69 U/L (ref 39–117)
ALT SERPL W P-5'-P-CCNC: 14 U/L (ref 1–33)
ANION GAP SERPL CALCULATED.3IONS-SCNC: 2.4 MMOL/L (ref 5–15)
AST SERPL-CCNC: 23 U/L (ref 1–32)
BILIRUB SERPL-MCNC: 0.5 MG/DL (ref 0–1.2)
BUN SERPL-MCNC: 22 MG/DL (ref 8–23)
BUN/CREAT SERPL: 18.5 (ref 7–25)
CALCIUM SPEC-SCNC: 9.6 MG/DL (ref 8.6–10.5)
CHLORIDE SERPL-SCNC: 110 MMOL/L (ref 98–107)
CO2 SERPL-SCNC: 26.6 MMOL/L (ref 22–29)
CREAT SERPL-MCNC: 1.19 MG/DL (ref 0.57–1)
EGFRCR SERPLBLD CKD-EPI 2021: 45.7 ML/MIN/1.73
GLOBULIN UR ELPH-MCNC: 3.1 GM/DL
GLUCOSE SERPL-MCNC: 175 MG/DL (ref 65–99)
POTASSIUM SERPL-SCNC: 5.2 MMOL/L (ref 3.5–5.2)
PROT SERPL-MCNC: 7.5 G/DL (ref 6–8.5)
SODIUM SERPL-SCNC: 139 MMOL/L (ref 136–145)

## 2023-05-26 ENCOUNTER — TELEPHONE (OUTPATIENT)
Dept: FAMILY MEDICINE CLINIC | Facility: CLINIC | Age: 83
End: 2023-05-26

## 2023-05-26 NOTE — TELEPHONE ENCOUNTER
Caller: Sabi Haynes    Relationship: Self    Best call back number: [phone]    Caller requesting test results: PATIENT    What test was performed: LABS    When was the test performed: 5.18.2023    Where was the test performed: IN OFFICE    Additional notes: WOULD LIKE A CALL TO DISCUSS RESULTS. PATIENT WOULD LIKE TO BE ADVISED ON HOW TO PROCEED.

## 2023-05-30 NOTE — TELEPHONE ENCOUNTER
Patient message given to Dr. Villalpando Friday 5/26/23.  Dr. Villalpando out of the office today due to family emergency.  Will speak with her once she is back in the office regarding patient's concerns.

## 2023-09-08 ENCOUNTER — OFFICE VISIT (OUTPATIENT)
Dept: FAMILY MEDICINE CLINIC | Facility: CLINIC | Age: 83
End: 2023-09-08
Payer: MEDICARE

## 2023-09-08 VITALS
WEIGHT: 180 LBS | DIASTOLIC BLOOD PRESSURE: 78 MMHG | RESPIRATION RATE: 14 BRPM | OXYGEN SATURATION: 97 % | HEIGHT: 66 IN | SYSTOLIC BLOOD PRESSURE: 132 MMHG | HEART RATE: 87 BPM | BODY MASS INDEX: 28.93 KG/M2 | TEMPERATURE: 98.5 F

## 2023-09-08 DIAGNOSIS — E78.5 HYPERLIPIDEMIA, UNSPECIFIED HYPERLIPIDEMIA TYPE: ICD-10-CM

## 2023-09-08 DIAGNOSIS — E11.65 TYPE 2 DIABETES MELLITUS WITH HYPERGLYCEMIA, WITH LONG-TERM CURRENT USE OF INSULIN: ICD-10-CM

## 2023-09-08 DIAGNOSIS — R79.89 ABNORMAL CBC: ICD-10-CM

## 2023-09-08 DIAGNOSIS — I10 PRIMARY HYPERTENSION: Primary | ICD-10-CM

## 2023-09-08 DIAGNOSIS — M62.81 MUSCLE WEAKNESS (GENERALIZED): ICD-10-CM

## 2023-09-08 DIAGNOSIS — Z79.4 TYPE 2 DIABETES MELLITUS WITH HYPERGLYCEMIA, WITH LONG-TERM CURRENT USE OF INSULIN: ICD-10-CM

## 2023-09-08 LAB
ALBUMIN SERPL-MCNC: 4.3 G/DL (ref 3.5–5.2)
ALBUMIN/GLOB SERPL: 1.3 G/DL
ALP SERPL-CCNC: 72 U/L (ref 39–117)
ALT SERPL W P-5'-P-CCNC: 13 U/L (ref 1–33)
ANION GAP SERPL CALCULATED.3IONS-SCNC: 9 MMOL/L (ref 5–15)
AST SERPL-CCNC: 18 U/L (ref 1–32)
BASOPHILS # BLD AUTO: 0.03 10*3/MM3 (ref 0–0.2)
BASOPHILS NFR BLD AUTO: 0.4 % (ref 0–1.5)
BILIRUB SERPL-MCNC: 0.3 MG/DL (ref 0–1.2)
BUN SERPL-MCNC: 19 MG/DL (ref 8–23)
BUN/CREAT SERPL: 16 (ref 7–25)
CALCIUM SPEC-SCNC: 10 MG/DL (ref 8.6–10.5)
CHLORIDE SERPL-SCNC: 103 MMOL/L (ref 98–107)
CO2 SERPL-SCNC: 26 MMOL/L (ref 22–29)
CREAT SERPL-MCNC: 1.19 MG/DL (ref 0.57–1)
DEPRECATED RDW RBC AUTO: 48.3 FL (ref 37–54)
EGFRCR SERPLBLD CKD-EPI 2021: 45.7 ML/MIN/1.73
EOSINOPHIL # BLD AUTO: 0.31 10*3/MM3 (ref 0–0.4)
EOSINOPHIL NFR BLD AUTO: 4.2 % (ref 0.3–6.2)
ERYTHROCYTE [DISTWIDTH] IN BLOOD BY AUTOMATED COUNT: 14.6 % (ref 12.3–15.4)
GLOBULIN UR ELPH-MCNC: 3.3 GM/DL
GLUCOSE SERPL-MCNC: 221 MG/DL (ref 65–99)
HBA1C MFR BLD: 8.2 % (ref 4.8–5.6)
HCT VFR BLD AUTO: 43.4 % (ref 34–46.6)
HGB BLD-MCNC: 14.4 G/DL (ref 12–15.9)
IMM GRANULOCYTES # BLD AUTO: 0.02 10*3/MM3 (ref 0–0.05)
IMM GRANULOCYTES NFR BLD AUTO: 0.3 % (ref 0–0.5)
IRON 24H UR-MRATE: 79 MCG/DL (ref 37–145)
IRON SATN MFR SERPL: 25 % (ref 20–50)
LYMPHOCYTES # BLD AUTO: 1.71 10*3/MM3 (ref 0.7–3.1)
LYMPHOCYTES NFR BLD AUTO: 23.1 % (ref 19.6–45.3)
MAGNESIUM SERPL-MCNC: 3.5 MG/DL (ref 1.6–2.4)
MCH RBC QN AUTO: 29.9 PG (ref 26.6–33)
MCHC RBC AUTO-ENTMCNC: 33.2 G/DL (ref 31.5–35.7)
MCV RBC AUTO: 90 FL (ref 79–97)
MONOCYTES # BLD AUTO: 0.67 10*3/MM3 (ref 0.1–0.9)
MONOCYTES NFR BLD AUTO: 9.1 % (ref 5–12)
NEUTROPHILS NFR BLD AUTO: 4.65 10*3/MM3 (ref 1.7–7)
NEUTROPHILS NFR BLD AUTO: 62.9 % (ref 42.7–76)
NRBC BLD AUTO-RTO: 0 /100 WBC (ref 0–0.2)
PLATELET # BLD AUTO: 253 10*3/MM3 (ref 140–450)
PMV BLD AUTO: 11 FL (ref 6–12)
POTASSIUM SERPL-SCNC: 5 MMOL/L (ref 3.5–5.2)
PROT SERPL-MCNC: 7.6 G/DL (ref 6–8.5)
RBC # BLD AUTO: 4.82 10*6/MM3 (ref 3.77–5.28)
SODIUM SERPL-SCNC: 138 MMOL/L (ref 136–145)
TIBC SERPL-MCNC: 319 MCG/DL (ref 298–536)
TRANSFERRIN SERPL-MCNC: 214 MG/DL (ref 200–360)
WBC NRBC COR # BLD: 7.39 10*3/MM3 (ref 3.4–10.8)

## 2023-09-08 PROCEDURE — 83540 ASSAY OF IRON: CPT | Performed by: FAMILY MEDICINE

## 2023-09-08 PROCEDURE — 84466 ASSAY OF TRANSFERRIN: CPT | Performed by: FAMILY MEDICINE

## 2023-09-08 PROCEDURE — 83735 ASSAY OF MAGNESIUM: CPT | Performed by: FAMILY MEDICINE

## 2023-09-08 PROCEDURE — 85025 COMPLETE CBC W/AUTO DIFF WBC: CPT | Performed by: FAMILY MEDICINE

## 2023-09-08 PROCEDURE — 1159F MED LIST DOCD IN RCRD: CPT | Performed by: FAMILY MEDICINE

## 2023-09-08 PROCEDURE — 80053 COMPREHEN METABOLIC PANEL: CPT | Performed by: FAMILY MEDICINE

## 2023-09-08 PROCEDURE — 3078F DIAST BP <80 MM HG: CPT | Performed by: FAMILY MEDICINE

## 2023-09-08 PROCEDURE — 1160F RVW MEDS BY RX/DR IN RCRD: CPT | Performed by: FAMILY MEDICINE

## 2023-09-08 PROCEDURE — 99214 OFFICE O/P EST MOD 30 MIN: CPT | Performed by: FAMILY MEDICINE

## 2023-09-08 PROCEDURE — 83036 HEMOGLOBIN GLYCOSYLATED A1C: CPT | Performed by: FAMILY MEDICINE

## 2023-09-08 PROCEDURE — 3075F SYST BP GE 130 - 139MM HG: CPT | Performed by: FAMILY MEDICINE

## 2023-09-08 RX ORDER — EZETIMIBE 10 MG/1
10 TABLET ORAL DAILY
Qty: 90 TABLET | Refills: 3 | Status: SHIPPED | OUTPATIENT
Start: 2023-09-08 | End: 2024-09-02

## 2023-09-08 RX ORDER — BENZONATATE 100 MG/1
100 CAPSULE ORAL 3 TIMES DAILY PRN
Qty: 90 CAPSULE | Refills: 1 | Status: SHIPPED | OUTPATIENT
Start: 2023-09-08 | End: 2023-11-07

## 2023-09-08 NOTE — PROGRESS NOTES
"Chief Complaint  Chief Complaint   Patient presents with    Diabetes     3 month follow-up    Allergies       HPI:  Sabi Haynes presents to [facility]    The patient has lost 6 pounds since her last visit. The patient has taken Zetia 10 mg in the past for hyperlipidemia.    The patient has a cough similar to the one she had in 04/2023. She believes it is allergies. She coughs up phlegm from her lungs when she eats.    The patient experiences burning in her bilateral feet when she wears certain shoes.    The patient is experiencing severe pain in her bilateral lower extremities. She is having trouble going up and down stairs. At times, she claims that her lower extremities \"just don't want to walk,\" and they feel like \"Jello.\" She believes she needs a wheelchair. She has bilateral hip arthroplasties, but they are worn out according to the patient. She has had steroid injections in her bilateral knees in the past.    The patient had fallen on a hardwood floor in the past, and her right upper extremity became swollen. This was before she had her upper extremity surgery.    The patient declines the Influenza and pneumonia vaccines. She received a pneumonia vaccine in the past and became very ill after it.    The patient has a bladder stimulator inserted. She claims that, when it is on, she has bowel movements frequently. When the stimulator is off, she can urinate by herself. The stimulator is controlled remotely. It is on for 2 weeks and off for 2 weeks. The patient needs to wear a pad at night because she experiences severe urinary incontinence, but during the day she is okay.      Procedures     Past History:  Medical History: has a past medical history of Acid reflux, Allergic (2002), Arthritis, Bladder disorder, Bladder paralysis, Blind spot scotoma, left, Condition not found, Coronary artery disease (1975), DDD (degenerative disc disease), cervical, DDD (degenerative disc " disease), lumbar, Diabetes mellitus, Gastric ulcer, Hearing loss, Heart attack, Heart disease, Hemorrhoids, History of confusion, History of migraine headaches, Hyperlipemia, Hyperlipidemia, Hypothyroidism, Incontinence of bowel, Incontinence of urine, Kidney disease, Left hand pain, Limb swelling, Macular degeneration, PONV (postoperative nausea and vomiting), Primary osteoarthritis of right knee (08/24/2018), Recent total retinal detachment, Renal calculi, Renal insufficiency (1965), Right BBB/left ant fasc block, Ringing in ears, Rotator cuff tear (06/17/2021), S/P arthroscopic partial medial meniscectomy (12/08/2017), S/P medial meniscectomy of right knee (10/11/2017), Seasonal allergies, Self-catheterizes urinary bladder, Short-term memory loss, Shortness of breath, Spinal stenosis, Stroke, Thyroid disorder, and Vitamin D deficiency.   Surgical History: has a past surgical history that includes Varicose vein surgery; Cholecystectomy; Hysterectomy; Laparoscopic tubal ligation; Tear duct surgery; Extracorporeal shock wave lithotripsy (Left, 9/13/2019); Other surgical history; Bladder surgery (2002); Carpal tunnel release; Colonoscopy; Cyst Removal; Gallbladder surgery; Total hip arthroplasty (2010); Hysterectomy (1969); Knee surgery (08/22); Eye surgery; Joint replacement; and Shoulder arthroscopy w/ rotator cuff repair (Right, 6/17/2021).   Family History: family history includes Arthritis in her father and mother; Cancer in her father; Diabetes in her mother; Heart disease in her mother; Leukemia in her father; Osteoporosis in her brother; Stroke in her son.   Social History: reports that she has never smoked. She has never been exposed to tobacco smoke. She has never used smokeless tobacco. She reports that she does not drink alcohol and does not use drugs.  Immunization History   Administered Date(s) Administered    COVID-19 (PFIZER) Purple Cap Monovalent 05/14/2021, 06/04/2021    Covid-19 (Pfizer) Gray Cap  "Monovalent 01/19/2022    PPD Test 06/22/2021    Tdap 04/11/2017, 08/16/2022         Allergies: Atorvastatin, Ibuprofen, Aspirin, Atenolol, Digoxin, Latex, Meperidine, Metformin, and Morphine     Medications:  Current Outpatient Medications on File Prior to Visit   Medication Sig Dispense Refill    Alcohol Swabs (Easy Touch Alcohol Prep Medium) 70 % pads 1 application by Epidural route 3 (Three) Times a Day. 100 each 5    Ascorbic Acid 100 MG chewable tablet       Cholecalciferol (VITAMIN D3) 5000 units capsule capsule Take 1 capsule by mouth Daily.      fluticasone (FLONASE) 50 MCG/ACT nasal spray 2 sprays into the nostril(s) as directed by provider Daily. 18.2 mL 0    glucose blood (FREESTYLE LITE) test strip       Insulin Glargine (Lantus SoloStar) 100 UNIT/ML injection pen Inject 50 Units under the skin into the appropriate area as directed Every Evening for 90 days. 45 mL 0    Insulin Pen Needle 31G X 5 MM misc Use as directed 100 each 3    Lancets (freestyle) lancets Prick finger to check blood sugars up to 3 times a day 100 each 5    levothyroxine (SYNTHROID, LEVOTHROID) 112 MCG tablet Take 1 tablet by mouth Daily for 90 days. 90 tablet 3    vitamin B-12 (CYANOCOBALAMIN) 500 MCG tablet Take 1 tablet by mouth Daily.       Current Facility-Administered Medications on File Prior to Visit   Medication Dose Route Frequency Provider Last Rate Last Admin    cyanocobalamin injection 1,000 mcg  1,000 mcg Intramuscular Q30 Days Drea Villalpando MD   1,000 mcg at 12/02/22 1725        Health Maintenance Due   Topic Date Due    DXA SCAN  Never done    Pneumococcal Vaccine 65+ (1 - PCV) Never done    ZOSTER VACCINE (1 of 2) Never done    URINE MICROALBUMIN  09/27/2020    DIABETIC EYE EXAM  02/04/2021       Vital Signs:   Vitals:    09/08/23 1624   BP: 132/78   Pulse: 87   Resp: 14   Temp: 98.5 °F (36.9 °C)   SpO2: 97%   Weight: 81.6 kg (180 lb)   Height: 167.6 cm (66\")      Body mass index is 29.05 kg/m².     ROS:  Review " of Systems   Constitutional:  Negative for fatigue and fever.   HENT:  Negative for congestion, ear pain and sinus pressure.    Respiratory:  Negative for cough, chest tightness and shortness of breath.    Cardiovascular:  Negative for chest pain, palpitations and leg swelling.   Gastrointestinal:  Negative for abdominal pain and diarrhea.   Genitourinary:  Negative for dysuria and frequency.   Neurological:  Negative for speech difficulty, headache and confusion.   Psychiatric/Behavioral:  Negative for agitation and behavioral problems.         Physical Exam  Vitals reviewed.   Constitutional:       Appearance: Normal appearance.   HENT:      Right Ear: Tympanic membrane normal.      Left Ear: Tympanic membrane normal.      Nose: Nose normal.   Eyes:      Extraocular Movements: Extraocular movements intact.      Conjunctiva/sclera: Conjunctivae normal.      Pupils: Pupils are equal, round, and reactive to light.   Cardiovascular:      Rate and Rhythm: Normal rate and regular rhythm.   Pulmonary:      Effort: Pulmonary effort is normal.      Breath sounds: Normal breath sounds.   Abdominal:      General: Bowel sounds are normal.   Musculoskeletal:         General: Normal range of motion.      Cervical back: Normal range of motion.   Skin:     General: Skin is warm and dry.   Neurological:      General: No focal deficit present.      Mental Status: She is alert and oriented to person, place, and time.   Psychiatric:         Mood and Affect: Mood normal.         Behavior: Behavior normal.        Result Review   Blood pressure 132/78,, hemoglobin A1c 8.7 percent.     Diagnoses and all orders for this visit:    1. Primary hypertension (Primary)    2. Hyperlipidemia, unspecified hyperlipidemia type  -     ezetimibe (Zetia) 10 MG tablet; Take 1 tablet by mouth Daily for 360 days.  Dispense: 90 tablet; Refill: 3    3. Type 2 diabetes mellitus with hyperglycemia, with long-term current use of insulin  -     Comprehensive  Metabolic Panel  -     Hemoglobin A1c  -     MicroAlbumin, Urine, Random - Urine, Clean Catch    4. Muscle weakness (generalized)  -     Magnesium    5. Abnormal CBC  -     CBC Auto Differential  -     Iron Profile    Other orders  -     benzonatate (Tessalon Perles) 100 MG capsule; Take 1 capsule by mouth 3 (Three) Times a Day As Needed for Cough for up to 60 days.  Dispense: 90 capsule; Refill: 1      1. Hyperlipidemia  - I will prescribe Zetia 10 mg 1 tablet daily.    Health maintenance  - I will order labs for the patient.    Smoking Cessation:    Sabi Haynes  reports that she has never smoked. She has never been exposed to tobacco smoke. She has never used smokeless tobacco.          Follow Up   Return in about 8 weeks (around 11/3/2023) for Medicare Wellness.  Patient was given instructions and counseling regarding her condition or for health maintenance advice. Please see specific information pulled into the AVS if appropriate.       Drea Villalpando MD    Transcribed from ambient dictation for Drea Villalpando MD by Joya Jordan.  09/08/23   19:46 EDT    Patient or patient representative verbalized consent to the visit recording.  I have personally performed the services described in this document as transcribed by the above individual, and it is both accurate and complete.

## 2023-09-20 ENCOUNTER — TELEPHONE (OUTPATIENT)
Dept: FAMILY MEDICINE CLINIC | Facility: CLINIC | Age: 83
End: 2023-09-20

## 2023-09-20 NOTE — TELEPHONE ENCOUNTER
Mrs. Haynes said that there is a man that keeps calling her , saying that she needs to call the office but he is not leaving his name. I told her that I would put a message in for her to have someone give her a call back because I dont see anything. She said that if she doesn't answer , you can  leave a detailed message

## 2023-10-03 ENCOUNTER — OFFICE VISIT (OUTPATIENT)
Dept: FAMILY MEDICINE CLINIC | Facility: CLINIC | Age: 83
End: 2023-10-03
Payer: MEDICARE

## 2023-10-03 VITALS
BODY MASS INDEX: 28.54 KG/M2 | SYSTOLIC BLOOD PRESSURE: 138 MMHG | OXYGEN SATURATION: 99 % | HEART RATE: 75 BPM | DIASTOLIC BLOOD PRESSURE: 76 MMHG | WEIGHT: 177.6 LBS | TEMPERATURE: 98 F | HEIGHT: 66 IN

## 2023-10-03 DIAGNOSIS — N39.0 URINARY TRACT INFECTION IN FEMALE: Primary | ICD-10-CM

## 2023-10-03 LAB
BILIRUB BLD-MCNC: NEGATIVE MG/DL
CLARITY, POC: ABNORMAL
COLOR UR: YELLOW
GLUCOSE UR STRIP-MCNC: NEGATIVE MG/DL
KETONES UR QL: NEGATIVE
LEUKOCYTE EST, POC: ABNORMAL
NITRITE UR-MCNC: POSITIVE MG/ML
PH UR: 5.5 [PH] (ref 5–8)
PROT UR STRIP-MCNC: NEGATIVE MG/DL
RBC # UR STRIP: NEGATIVE /UL
SP GR UR: 1.02 (ref 1–1.03)
UROBILINOGEN UR QL: ABNORMAL

## 2023-10-03 PROCEDURE — 1159F MED LIST DOCD IN RCRD: CPT | Performed by: NURSE PRACTITIONER

## 2023-10-03 PROCEDURE — 87086 URINE CULTURE/COLONY COUNT: CPT | Performed by: NURSE PRACTITIONER

## 2023-10-03 PROCEDURE — 1160F RVW MEDS BY RX/DR IN RCRD: CPT | Performed by: NURSE PRACTITIONER

## 2023-10-03 PROCEDURE — 81002 URINALYSIS NONAUTO W/O SCOPE: CPT | Performed by: NURSE PRACTITIONER

## 2023-10-03 PROCEDURE — 87077 CULTURE AEROBIC IDENTIFY: CPT | Performed by: NURSE PRACTITIONER

## 2023-10-03 PROCEDURE — 99213 OFFICE O/P EST LOW 20 MIN: CPT | Performed by: NURSE PRACTITIONER

## 2023-10-03 PROCEDURE — 3078F DIAST BP <80 MM HG: CPT | Performed by: NURSE PRACTITIONER

## 2023-10-03 PROCEDURE — 3075F SYST BP GE 130 - 139MM HG: CPT | Performed by: NURSE PRACTITIONER

## 2023-10-03 PROCEDURE — 87186 SC STD MICRODIL/AGAR DIL: CPT | Performed by: NURSE PRACTITIONER

## 2023-10-03 RX ORDER — CEFDINIR 300 MG/1
300 CAPSULE ORAL 2 TIMES DAILY
Qty: 14 CAPSULE | Refills: 0 | Status: SHIPPED | OUTPATIENT
Start: 2023-10-03 | End: 2023-10-05 | Stop reason: SDUPTHER

## 2023-10-03 NOTE — PROGRESS NOTES
"Chief Complaint  Abdominal Pain and Vaginal Pain    Subjective      Sabi Haynes is an 82 year old female that presents to [facility] with c/o lower abdominal cramping that has been going on for over one month. She reports that she self catheterizes and now she is now experiencing pain at the end of cathing. She denies burning, odor or hematuria. She states that she is getting between 600-1000ml each time she caths. She denies fever, body aches or chills.         History of Present Illness    Current Outpatient Medications   Medication Instructions    Alcohol Swabs (Easy Touch Alcohol Prep Medium) 70 % pads 1 application , Epidural, 3 Times Daily    Ascorbic Acid 100 MG chewable tablet     benzonatate (TESSALON PERLES) 100 mg, Oral, 3 Times Daily PRN    cefdinir (OMNICEF) 300 mg, Oral, 2 Times Daily    ezetimibe (ZETIA) 10 mg, Oral, Daily    fluticasone (FLONASE) 50 MCG/ACT nasal spray 2 sprays, Nasal, Daily    glucose blood (FREESTYLE LITE) test strip No dose, route, or frequency recorded.    Insulin Pen Needle 31G X 5 MM misc Use as directed    Lancets (freestyle) lancets Prick finger to check blood sugars up to 3 times a day    Lantus SoloStar 50 Units, Subcutaneous, Every Evening    levothyroxine (SYNTHROID, LEVOTHROID) 112 mcg, Oral, Daily    vitamin B-12 (CYANOCOBALAMIN) 500 mcg, Oral, Daily    vitamin D3 5,000 Units, Oral, Daily       The following portions of the patient's history were reviewed and updated as appropriate: allergies, current medications, past family history, past medical history, past social history, past surgical history, and problem list.    Objective   Vital Signs:   /76   Pulse 75   Temp 98 °F (36.7 °C)   Ht 167.6 cm (66\")   Wt 80.6 kg (177 lb 9.6 oz)   SpO2 99%   BMI 28.67 kg/m²     Wt Readings from Last 3 Encounters:   10/03/23 80.6 kg (177 lb 9.6 oz)   09/08/23 81.6 kg (180 lb)   05/18/23 84.4 kg (186 lb)     BP Readings from Last 3 " Encounters:   10/03/23 138/76   09/08/23 132/78   05/18/23 128/78     Physical Exam  Vitals reviewed.   Constitutional:       Appearance: Normal appearance. She is well-developed. She is not ill-appearing.   HENT:      Head: Normocephalic and atraumatic.   Eyes:      Conjunctiva/sclera: Conjunctivae normal.      Pupils: Pupils are equal, round, and reactive to light.   Pulmonary:      Effort: Pulmonary effort is normal.   Abdominal:      General: Bowel sounds are normal.      Palpations: Abdomen is soft.      Tenderness: There is no right CVA tenderness or left CVA tenderness.   Skin:     General: Skin is warm and dry.   Neurological:      Mental Status: She is alert and oriented to person, place, and time.   Psychiatric:         Mood and Affect: Mood and affect normal.         Behavior: Behavior normal.        Result Review :  The following data was reviewed by: GUSTAVO Michelle on 10/03/2023:          Lab Results (last 72 hours)       Procedure Component Value Units Date/Time    POCT urinalysis dipstick, manual [[id number]]  (Abnormal) Collected: 10/03/23 1530    Specimen: Urine Updated: 10/03/23 1531     Color Yellow     Clarity, UA Turbid     Glucose, UA Negative mg/dL      Bilirubin Negative     Ketones, UA Negative     Specific Gravity  1.020     Blood, UA Negative     pH, Urine 5.5     Protein, POC Negative mg/dL      Urobilinogen, UA 0.2 E.U./dL     Leukocytes Small (1+)     Nitrite, UA Positive    Urine Culture - Urine, Urine, Catheter In/Out [[id number]] Collected: 10/03/23 1554    Specimen: Urine, Catheter In/Out Updated: 10/03/23 1556             No Images in the past 120 days found..    Lab Results   Component Value Date    SARSANTIGEN Detected (A) 02/17/2023    COVID19 Not Detected 06/21/2021    RAPFLUA Negative 12/14/2022    RAPFLUB Negative 12/14/2022    RAPSCRN Negative 12/14/2022    INR 1.03 (L) 04/11/2019    BILIRUBINUR Negative 10/03/2023    POCGLU 200 (H) 06/20/2022       Procedures         Assessment and Plan   Diagnoses and all orders for this visit:    1. Urinary tract infection in female (Primary)  -     POCT urinalysis dipstick, manual  -     Urine Culture - Urine, Urine, Catheter In/Out; Future  -     cefdinir (OMNICEF) 300 MG capsule; Take 1 capsule by mouth 2 (Two) Times a Day for 7 days.  Dispense: 14 capsule; Refill: 0  -     Urine Culture - Urine, Urine, Catheter In/Out                  There are no discontinued medications.       Follow Up   No follow-ups on file.  Patient was given instructions and counseling regarding her condition or for health maintenance advice. Please see specific information pulled into the AVS if appropriate.       Solange Boss, GUSTAVO  10/04/23  09:47 EDT

## 2023-10-04 ENCOUNTER — TELEPHONE (OUTPATIENT)
Dept: FAMILY MEDICINE CLINIC | Facility: CLINIC | Age: 83
End: 2023-10-04
Payer: MEDICARE

## 2023-10-04 NOTE — TELEPHONE ENCOUNTER
PT SAYING SHE RAN OVER AND DESTROYED HER PRESCRIPTION. SHE ALSO HAS QUESTIONS REGARDING THE DOSAGE. SHE IS THINKING IT IS NOT ENOUGH. PLEASE CALL HER ASAP. THANK YOU

## 2023-10-05 DIAGNOSIS — N39.0 URINARY TRACT INFECTION IN FEMALE: ICD-10-CM

## 2023-10-05 LAB — BACTERIA SPEC AEROBE CULT: ABNORMAL

## 2023-10-05 RX ORDER — CEFDINIR 300 MG/1
300 CAPSULE ORAL 2 TIMES DAILY
Qty: 14 CAPSULE | Refills: 0 | Status: SHIPPED | OUTPATIENT
Start: 2023-10-05 | End: 2023-10-12

## 2023-10-05 NOTE — TELEPHONE ENCOUNTER
Pt seen Solange Boss and received abx , per pt she ran it over and was destroyed . I resent in her abx to Pamela . Pt is aware .

## 2023-10-19 ENCOUNTER — READMISSION MANAGEMENT (OUTPATIENT)
Dept: CALL CENTER | Facility: HOSPITAL | Age: 83
End: 2023-10-19
Payer: MEDICARE

## 2023-10-19 NOTE — OUTREACH NOTE
Prep Survey      Flowsheet Row Responses   [facility] facility patient discharged from? Non-BH   Is LACE score < 7 ? Non-BH Discharge   Eligibility [facility]   Date of Admission 10/16/23   Date of Discharge 10/18/23   Discharge Disposition Home or Self Care   Discharge diagnosis Acute cystitis with hematuria   Does the patient have one of the following disease processes/diagnoses(primary or secondary)? Other   Prep survey completed? Yes            Karen Ott Registered Nurse

## 2023-10-20 ENCOUNTER — TRANSITIONAL CARE MANAGEMENT TELEPHONE ENCOUNTER (OUTPATIENT)
Dept: CALL CENTER | Facility: HOSPITAL | Age: 83
End: 2023-10-20
Payer: MEDICARE

## 2023-10-20 NOTE — OUTREACH NOTE
Call Center TCM Note      Flowsheet Row Responses   [facility] patient discharged from? Non-  [[facility]]   Does the patient have one of the following disease processes/diagnoses(primary or secondary)? Other   TCM attempt successful? Yes   Call start time 0958   Call end time 1003   Discharge diagnosis Acute cystitis with hematuria   Person spoke with today (if not patient) and relationship Patient   Meds reviewed with patient/caregiver? Yes  [Patient reports that she is on antibiotics.]   Does the patient have all medications ordered at discharge? Yes   Is the patient taking all medications as directed (includes completed medication regime)? Yes   Comments PCP Dr Villalpando. Patient declines to schedule PCP appt,  denies any needs from primary care. Reports following up with urology today.   Does the patient have an appointment with their PCP within 7-14 days of discharge? No   Nursing Interventions Patient declined scheduling/rescheduling appointment at this time, Patient desires to follow up with specialty only   Has home health visited the patient within 72 hours of discharge? N/A   Psychosocial issues? No   Did the patient receive a copy of their discharge instructions? Yes   What is the patient's perception of their health status since discharge? Same  [Patient reports that she has maria catheter in place.]   Is the patient/caregiver able to teach back signs and symptoms related to disease process for when to call PCP? Yes   Is the patient/caregiver able to teach back the hierarchy of who to call/visit for symptoms/problems? PCP, Specialist, Home health nurse, Urgent Care, ED, 911 Yes   If the patient is a current smoker, are they able to teach back resources for cessation? Not a smoker   TCM call completed? Yes   Wrap up additional comments Patient states that she is going to urology office appt today.   Call end time 1003   Would this patient benefit from a Referral to [facility]? No   Is  the patient interested in additional calls from an ambulatory ? No            Chelo Toure RN    10/20/2023, 10:05 EDT

## 2023-11-14 ENCOUNTER — TELEPHONE (OUTPATIENT)
Dept: FAMILY MEDICINE CLINIC | Facility: CLINIC | Age: 83
End: 2023-11-14
Payer: MEDICARE

## 2023-11-14 DIAGNOSIS — E11.65 TYPE 2 DIABETES MELLITUS WITH HYPERGLYCEMIA, WITH LONG-TERM CURRENT USE OF INSULIN: ICD-10-CM

## 2023-11-14 DIAGNOSIS — Z79.4 TYPE 2 DIABETES MELLITUS WITH HYPERGLYCEMIA, WITH LONG-TERM CURRENT USE OF INSULIN: ICD-10-CM

## 2023-11-14 RX ORDER — LEVOTHYROXINE SODIUM 112 UG/1
112 TABLET ORAL DAILY
Qty: 90 TABLET | Refills: 3 | Status: SHIPPED | OUTPATIENT
Start: 2023-11-14 | End: 2023-11-14 | Stop reason: SDUPTHER

## 2023-11-14 RX ORDER — LANCETS 28 GAUGE
EACH MISCELLANEOUS
Qty: 100 EACH | Refills: 5 | Status: SHIPPED | OUTPATIENT
Start: 2023-11-14 | End: 2023-11-15 | Stop reason: SDUPTHER

## 2023-11-14 RX ORDER — LANCETS 28 GAUGE
EACH MISCELLANEOUS
Qty: 100 EACH | Refills: 5 | Status: SHIPPED | OUTPATIENT
Start: 2023-11-14 | End: 2023-11-14 | Stop reason: SDUPTHER

## 2023-11-14 RX ORDER — ISOPROPYL ALCOHOL 70 ML/100ML
1 SWAB TOPICAL 3 TIMES DAILY
Qty: 100 EACH | Refills: 5 | Status: SHIPPED | OUTPATIENT
Start: 2023-11-14 | End: 2023-11-14 | Stop reason: SDUPTHER

## 2023-11-14 RX ORDER — LEVOTHYROXINE SODIUM 112 UG/1
112 TABLET ORAL DAILY
Qty: 90 TABLET | Refills: 3 | Status: SHIPPED | OUTPATIENT
Start: 2023-11-14 | End: 2023-11-15 | Stop reason: SDUPTHER

## 2023-11-14 RX ORDER — INSULIN GLARGINE 100 [IU]/ML
50 INJECTION, SOLUTION SUBCUTANEOUS EVERY EVENING
Start: 2023-11-14 | End: 2023-11-15 | Stop reason: SDUPTHER

## 2023-11-14 RX ORDER — INSULIN GLARGINE 100 [IU]/ML
50 INJECTION, SOLUTION SUBCUTANEOUS EVERY EVENING
Start: 2023-11-14 | End: 2023-11-14 | Stop reason: SDUPTHER

## 2023-11-14 RX ORDER — ISOPROPYL ALCOHOL 70 ML/100ML
1 SWAB TOPICAL 3 TIMES DAILY
Qty: 100 EACH | Refills: 5 | Status: SHIPPED | OUTPATIENT
Start: 2023-11-14 | End: 2023-11-15 | Stop reason: SDUPTHER

## 2023-11-14 NOTE — TELEPHONE ENCOUNTER
PT CAME IN TODAY ASKING FOR REFILLS ON LANTUS SOLOSTAR GLARGINE 100UNITS/ML [3ML],  LEVOTHYROXINE (SYNTHROID) 112 MCG TAB AND ALCOHOL PADS AS WELL REFILLED ASAP. SHE IS ON HER LAST DOSAGE.

## 2023-11-15 ENCOUNTER — TELEPHONE (OUTPATIENT)
Dept: FAMILY MEDICINE CLINIC | Facility: CLINIC | Age: 83
End: 2023-11-15

## 2023-11-15 ENCOUNTER — TELEPHONE (OUTPATIENT)
Dept: FAMILY MEDICINE CLINIC | Facility: CLINIC | Age: 83
End: 2023-11-15
Payer: MEDICARE

## 2023-11-15 DIAGNOSIS — Z79.4 TYPE 2 DIABETES MELLITUS WITH HYPERGLYCEMIA, WITH LONG-TERM CURRENT USE OF INSULIN: ICD-10-CM

## 2023-11-15 DIAGNOSIS — E11.65 TYPE 2 DIABETES MELLITUS WITH HYPERGLYCEMIA, WITH LONG-TERM CURRENT USE OF INSULIN: ICD-10-CM

## 2023-11-15 RX ORDER — ISOPROPYL ALCOHOL 70 ML/100ML
1 SWAB TOPICAL 3 TIMES DAILY
Qty: 100 EACH | Refills: 5 | Status: SHIPPED | OUTPATIENT
Start: 2023-11-15

## 2023-11-15 RX ORDER — LEVOTHYROXINE SODIUM 112 UG/1
112 TABLET ORAL DAILY
Qty: 30 TABLET | Refills: 2 | Status: SHIPPED | OUTPATIENT
Start: 2023-11-15 | End: 2024-02-13

## 2023-11-15 RX ORDER — INSULIN GLARGINE 100 [IU]/ML
50 INJECTION, SOLUTION SUBCUTANEOUS EVERY EVENING
Start: 2023-11-15 | End: 2024-02-13

## 2023-11-15 RX ORDER — INSULIN GLARGINE 100 [IU]/ML
50 INJECTION, SOLUTION SUBCUTANEOUS EVERY EVENING
Start: 2023-11-15 | End: 2023-11-15 | Stop reason: SDUPTHER

## 2023-11-15 RX ORDER — LANCETS 28 GAUGE
EACH MISCELLANEOUS
Qty: 100 EACH | Refills: 5 | Status: SHIPPED | OUTPATIENT
Start: 2023-11-15

## 2023-11-15 NOTE — TELEPHONE ENCOUNTER
Caller: Sabi Haynes    Relationship to patient: Self    Best call back number: [phone]    Patient is needing: PATIENT WOULD LIKE TO THANK EVERYONE FOR THEIR EFFORTS WITH HER PRESCRIPTIONS. SHE FOUND OUT THAT THE [facility] KICKS BACK SCRIPTS IF THERE ARE REFILLS LEFT ON HER PRESCRIPTIONS. SHE WAS SENT TO [facility] SOMEWHERE WENT SHE WAS SENT TO NURSE AND VOICEMAIL. SHE WAS ON HOLD FOR ABOUT AN HOUR.    EVERYTIME SHE WAS PUT ON HOLD AND SENT TO NURSE IT AUTOMATICALLY WENT TO [place]. ANYWAY, HER PRESCRIPTIONS ARE TAKEN CARE OF.

## 2024-02-27 DIAGNOSIS — E11.65 TYPE 2 DIABETES MELLITUS WITH HYPERGLYCEMIA, WITH LONG-TERM CURRENT USE OF INSULIN: ICD-10-CM

## 2024-02-27 DIAGNOSIS — Z79.4 TYPE 2 DIABETES MELLITUS WITH HYPERGLYCEMIA, WITH LONG-TERM CURRENT USE OF INSULIN: ICD-10-CM

## 2024-02-27 RX ORDER — INSULIN GLARGINE 100 [IU]/ML
50 INJECTION, SOLUTION SUBCUTANEOUS EVERY EVENING
Start: 2024-02-27 | End: 2024-05-27

## 2024-02-27 RX ORDER — ISOPROPYL ALCOHOL 70 ML/100ML
1 SWAB TOPICAL 3 TIMES DAILY
Qty: 100 EACH | Refills: 5 | Status: SHIPPED | OUTPATIENT
Start: 2024-02-27

## 2024-02-27 NOTE — TELEPHONE ENCOUNTER
Caller: Sabi Haynes    Relationship: Self    Best call back number: [phone]     Requested Prescriptions:   Requested Prescriptions     Pending Prescriptions Disp Refills    Insulin Glargine (Lantus SoloStar) 100 UNIT/ML injection pen       Sig: Inject 50 Units under the skin into the appropriate area as directed Every Evening for 90 days.    Insulin Pen Needle 31G X 5 MM misc 100 each 3     Sig: Use as directed    Alcohol Swabs (Easy Touch Alcohol Prep Medium) 70 % pads 100 each 5     Si application  by Epidural route 3 (Three) Times a Day.        Pharmacy where request should be sent: [facility] [phone] [place], [place] [phone]      Last office visit with prescribing clinician: 2023   Last telemedicine visit with prescribing clinician: Visit date not found   Next office visit with prescribing clinician: Visit date not found     Additional details provided by patient: LESS THAN THREE DAY SUPPLY.     Does the patient have less than a 3 day supply:  [x] Yes  [] No    Would you like a call back once the refill request has been completed: [x] Yes [] No    If the office needs to give you a call back, can they leave a voicemail: [x] Yes [] No    Suzanne Kimble Rep   24 13:47 EST

## 2024-03-22 ENCOUNTER — TELEPHONE (OUTPATIENT)
Dept: FAMILY MEDICINE CLINIC | Facility: CLINIC | Age: 84
End: 2024-03-22

## 2024-03-22 NOTE — TELEPHONE ENCOUNTER
Caller: Sabi Haynes    Relationship: Self    Best call back number: [phone]    What form or medical record are you requesting: PATIENT IS REQUESTING ANY PAPERWORK THAT SHE WILL NEED TO FILL OUT OR SIGN BEFORE SHE CAN BE SEEN AT HER APPOINTMENT 04/11/2024 BE MAILED TO HER AND SHE WILL BRING THEM TO THE APPOINTMENT     MAIL TO:    [address] [place] [address]       PLEASE NOTIFY IF THERE WILL NOT BE ANY PAPERWORK NEEDED

## 2024-04-08 ENCOUNTER — TELEPHONE (OUTPATIENT)
Dept: FAMILY MEDICINE CLINIC | Facility: CLINIC | Age: 84
End: 2024-04-08
Payer: MEDICARE

## 2024-04-08 NOTE — TELEPHONE ENCOUNTER
PATIENT RETURNING A CALL, I DIDN'T SEE ANY NOTES SO IM NOT SURE WHAT IT'S ABOUT AND THE PATIENT DOESN'T EITHER.

## 2024-04-11 ENCOUNTER — OFFICE VISIT (OUTPATIENT)
Dept: FAMILY MEDICINE CLINIC | Facility: CLINIC | Age: 84
End: 2024-04-11
Payer: MEDICARE

## 2024-04-11 VITALS
OXYGEN SATURATION: 98 % | DIASTOLIC BLOOD PRESSURE: 82 MMHG | WEIGHT: 184.6 LBS | HEART RATE: 78 BPM | BODY MASS INDEX: 29.67 KG/M2 | HEIGHT: 66 IN | SYSTOLIC BLOOD PRESSURE: 138 MMHG | TEMPERATURE: 98 F

## 2024-04-11 DIAGNOSIS — E78.5 HYPERLIPIDEMIA, UNSPECIFIED HYPERLIPIDEMIA TYPE: ICD-10-CM

## 2024-04-11 DIAGNOSIS — G89.29 CHRONIC LOW BACK PAIN WITH BILATERAL SCIATICA, UNSPECIFIED BACK PAIN LATERALITY: ICD-10-CM

## 2024-04-11 DIAGNOSIS — M54.42 CHRONIC LOW BACK PAIN WITH BILATERAL SCIATICA, UNSPECIFIED BACK PAIN LATERALITY: ICD-10-CM

## 2024-04-11 DIAGNOSIS — M54.41 CHRONIC LOW BACK PAIN WITH BILATERAL SCIATICA, UNSPECIFIED BACK PAIN LATERALITY: ICD-10-CM

## 2024-04-11 DIAGNOSIS — M62.81 GENERALIZED MUSCLE WEAKNESS: ICD-10-CM

## 2024-04-11 DIAGNOSIS — Z79.4 TYPE 2 DIABETES MELLITUS WITH HYPERGLYCEMIA, WITH LONG-TERM CURRENT USE OF INSULIN: ICD-10-CM

## 2024-04-11 DIAGNOSIS — E11.65 TYPE 2 DIABETES MELLITUS WITH HYPERGLYCEMIA, WITH LONG-TERM CURRENT USE OF INSULIN: ICD-10-CM

## 2024-04-11 DIAGNOSIS — E03.9 HYPOTHYROIDISM, UNSPECIFIED TYPE: Primary | ICD-10-CM

## 2024-04-11 PROCEDURE — 80053 COMPREHEN METABOLIC PANEL: CPT | Performed by: FAMILY MEDICINE

## 2024-04-11 PROCEDURE — 80061 LIPID PANEL: CPT | Performed by: FAMILY MEDICINE

## 2024-04-11 PROCEDURE — 83036 HEMOGLOBIN GLYCOSYLATED A1C: CPT | Performed by: FAMILY MEDICINE

## 2024-04-11 PROCEDURE — 84481 FREE ASSAY (FT-3): CPT | Performed by: FAMILY MEDICINE

## 2024-04-11 PROCEDURE — 3079F DIAST BP 80-89 MM HG: CPT | Performed by: FAMILY MEDICINE

## 2024-04-11 PROCEDURE — 84439 ASSAY OF FREE THYROXINE: CPT | Performed by: FAMILY MEDICINE

## 2024-04-11 PROCEDURE — 84443 ASSAY THYROID STIM HORMONE: CPT | Performed by: FAMILY MEDICINE

## 2024-04-11 PROCEDURE — 99214 OFFICE O/P EST MOD 30 MIN: CPT | Performed by: FAMILY MEDICINE

## 2024-04-11 PROCEDURE — G2211 COMPLEX E/M VISIT ADD ON: HCPCS | Performed by: FAMILY MEDICINE

## 2024-04-11 PROCEDURE — 3075F SYST BP GE 130 - 139MM HG: CPT | Performed by: FAMILY MEDICINE

## 2024-04-11 PROCEDURE — 36415 COLL VENOUS BLD VENIPUNCTURE: CPT | Performed by: FAMILY MEDICINE

## 2024-04-11 RX ORDER — PROCHLORPERAZINE 25 MG/1
1 SUPPOSITORY RECTAL CONTINUOUS
Qty: 1 EACH | Refills: 0 | Status: SHIPPED | OUTPATIENT
Start: 2024-04-11 | End: 2025-04-11

## 2024-04-11 RX ORDER — PROCHLORPERAZINE 25 MG/1
SUPPOSITORY RECTAL
Qty: 3 EACH | Refills: 11 | Status: SHIPPED | OUTPATIENT
Start: 2024-04-11 | End: 2024-05-11

## 2024-04-11 RX ORDER — LEVOTHYROXINE SODIUM 112 UG/1
TABLET ORAL
COMMUNITY
Start: 2022-06-14 | End: 2024-04-12

## 2024-04-11 RX ORDER — DULAGLUTIDE 0.75 MG/.5ML
0.75 INJECTION, SOLUTION SUBCUTANEOUS WEEKLY
Qty: 2 ML | Refills: 2 | Status: SHIPPED | OUTPATIENT
Start: 2024-04-11 | End: 2024-05-11

## 2024-04-11 RX ORDER — PROCHLORPERAZINE 25 MG/1
1 SUPPOSITORY RECTAL CONTINUOUS
Qty: 1 EACH | Refills: 11 | Status: SHIPPED | OUTPATIENT
Start: 2024-04-11 | End: 2024-05-11

## 2024-04-11 NOTE — PROGRESS NOTES
Chief Complaint  Chief Complaint   Patient presents with    Diabetes     Has difficulty using glucometer can't get enough blood     Med Refill     Lantus and needles     Thyroid Problem    Hyperlipidemia    Fatigue     Legs want to give out        HPI:  Sbai Haynes presents to [facility]    The patient is an 83-year-old female who presents for follow up of diabetes mellitus, fatigue, and hyperlipidemia. She is accompanied by an adult female.    She was transitioned to Synthroid 112 mcg during her last visit; however, she developed hives as a side effect, leading her to feel unwell. She resumed the medication, and the hives recurred so she discontinued it. The itching has escalated, causing her to scratch herself and oozing blood in the rash. She is not sure if she was taking generic levothyroxine or brand Synthroid.     She has been using probiotics since she had UTI. She was hospitalized for 4 days due to a urinary tract infection in 10/2023. The accompanying adult female reports that the patient frequently experiences UTIs, and she was previously using gel for catheter insertion. She has a stimulator placement for her bladder, and it has been causing her lower extremity pain and exhaustion, limiting her ability to shop without exhaustion. A new physician informed her that her uncontrolled diabetes and neuropathy could be deteriorating the stimulator's functionality. before stimulator was on, she was having urge incontinence. She was advised to self-catheterize at least 6 times in 24 hours, a change from her previous schedule of 6 times in every 12 hours. Since the stimulator was turned back on, she has only urinated twice in the pad in a week and urine output in maria catheter is up to 1000cc, despite maintaining a high-water intake and she thinks her bladder is not functioning properly due to neuropathy.     She has been experiencing pain and swelling in her left hand and  thumb for years. She went to orthopedics who wanted to give her steroid injection in her left hand, but she declined. Currently left thumb pain has worsened, and she cannot open the door with her thumb.     Her kids keep telling her that she is developing Alzheimer's disease    The adult female reports that the patients last A1c was 8.1 percent. She has been monitoring her blood glucose daily. However, she was given a blood sugar machine, which she has been unable to get enough blood with fingerstick. She has noticed her blood glucose levels have been dropping frequently, causing her concern. The adult female reports that the patient occasionally forgets to take her injection. She is currently on Trulicity, and she wonders if she can just use it and avoid insulin. She reports constant hunger and a poor sleep schedule.    The adult female reports that the patient underwent extensive blood work during her hospitalization in 10/2023. She experienced confusion for a duration of 4 days following her shoulder surgery in the past, which resulted in bedsores and a severe rash. She was advised to undergo physical therapy for 6 weeks.     She noticed a change in her mood, characterized by excessive sleepiness and fatigue. Her overall well-being fluctuates, with some days feeling better than others. She has noticed a raspy voice in the evenings, the cause of which she is uncertain, and is uncertain if it is related to her thyroid.     The adult female reports that the patient has been experiencing leg pain, to the extent that she is unable to visit the commissary. She expresses a desire to engage in shopping and other activities, but her condition prevents her from doing so. She underwent a hip replacement in 2009 and 2010. She has been managing her pain with Tylenol, but is apprehensive about taking Advil due to potential kidney damage.    She has a history of kidney stones.    Vital Signs  The patient's blood pressure is  132/82 mmHg.          Procedures     Past History:  Medical History: has a past medical history of Acid reflux, Allergic (2002), Arthritis, Bladder disorder, Bladder paralysis, Blind spot scotoma, left, Condition not found, Coronary artery disease (1975), DDD (degenerative disc disease), cervical, DDD (degenerative disc disease), lumbar, Diabetes mellitus, Gastric ulcer, Hearing loss, Heart attack, Heart disease, Hemorrhoids, History of confusion, History of migraine headaches, Hyperlipemia, Hyperlipidemia, Hypothyroidism, Incontinence of bowel, Incontinence of urine, Kidney disease, Left hand pain, Limb swelling, Macular degeneration, PONV (postoperative nausea and vomiting), Primary osteoarthritis of right knee (08/24/2018), Recent total retinal detachment, Renal calculi, Renal insufficiency (1965), Right BBB/left ant fasc block, Ringing in ears, Rotator cuff tear (06/17/2021), S/P arthroscopic partial medial meniscectomy (12/08/2017), S/P medial meniscectomy of right knee (10/11/2017), Seasonal allergies, Self-catheterizes urinary bladder, Short-term memory loss, Shortness of breath, Spinal stenosis, Stroke, Thyroid disorder, and Vitamin D deficiency.   Surgical History: has a past surgical history that includes Varicose vein surgery; Cholecystectomy; Hysterectomy; Laparoscopic tubal ligation; Tear duct surgery; Extracorporeal shock wave lithotripsy (Left, 9/13/2019); Other surgical history; Bladder surgery (2002); Carpal tunnel release; Colonoscopy; Cyst Removal; Gallbladder surgery; Total hip arthroplasty (2010); Hysterectomy (1969); Knee surgery (08/22); Eye surgery; Joint replacement; and Shoulder arthroscopy w/ rotator cuff repair (Right, 6/17/2021).   Family History: family history includes Arthritis in her father and mother; Cancer in her father; Diabetes in her mother; Heart disease in her mother; Leukemia in her father; Osteoporosis in her brother; Stroke in her son.   Social History: reports that she  has never smoked. She has never been exposed to tobacco smoke. She has never used smokeless tobacco. She reports that she does not drink alcohol and does not use drugs.  Immunization History   Administered Date(s) Administered    COVID-19 (PFIZER) Purple Cap Monovalent 05/14/2021, 06/04/2021    Covid-19 (Pfizer) Gray Cap Monovalent 01/19/2022    PPD Test 06/22/2021    Tdap 04/11/2017, 08/16/2022         Allergies: Atorvastatin, Ibuprofen, Celecoxib, Exenatide, Glipizide, Pioglitazone, Sitagliptin, Acarbose, Aspirin, Atenolol, Digoxin, Hydromorphone, Latex, Meperidine, Metformin, Morphine, and Propoxyphene     Medications:  Current Outpatient Medications on File Prior to Visit   Medication Sig Dispense Refill    Alcohol Swabs (Easy Touch Alcohol Prep Medium) 70 % pads 1 application  by Epidural route 3 (Three) Times a Day. 100 each 5    Ascorbic Acid 100 MG chewable tablet       Cholecalciferol (VITAMIN D3) 5000 units capsule capsule Take 1 capsule by mouth Daily.      ezetimibe (Zetia) 10 MG tablet Take 1 tablet by mouth Daily for 360 days. 90 tablet 3    fluticasone (FLONASE) 50 MCG/ACT nasal spray 2 sprays into the nostril(s) as directed by provider Daily. 18.2 mL 0    glucose blood (FREESTYLE LITE) test strip       Insulin Glargine (Lantus SoloStar) 100 UNIT/ML injection pen Inject 50 Units under the skin into the appropriate area as directed Every Evening for 90 days.      Insulin Pen Needle 31G X 5 MM misc Use as directed 100 each 3    Lancets (freestyle) lancets Prick finger to check blood sugars up to 3 times a day 100 each 5    vitamin B-12 (CYANOCOBALAMIN) 500 MCG tablet Take 1 tablet by mouth Daily.       No current facility-administered medications on file prior to visit.        Health Maintenance Due   Topic Date Due    DXA SCAN  Never done    Pneumococcal Vaccine 65+ (1 of 2 - PCV) Never done    ZOSTER VACCINE (1 of 2) Never done    RSV Vaccine - Adults (1 - 1-dose 60+ series) Never done    ANNUAL  "WELLNESS VISIT  Never done    URINE MICROALBUMIN  09/27/2020    DIABETIC EYE EXAM  02/04/2021    BMI FOLLOWUP  04/07/2024       Vital Signs:   Vitals:    04/11/24 1522   BP: 138/82   Pulse: 78   Temp: 98 °F (36.7 °C)   SpO2: 98%   Weight: 83.7 kg (184 lb 9.6 oz)   Height: 167.6 cm (66\")      Body mass index is 29.8 kg/m².     ROS:  Review of Systems   Constitutional:  Positive for fatigue. Negative for fever.   HENT:  Negative for congestion, ear pain and sinus pressure.    Respiratory:  Negative for cough, chest tightness and shortness of breath.    Cardiovascular:  Negative for chest pain, palpitations and leg swelling.   Gastrointestinal:  Negative for abdominal pain and diarrhea.   Genitourinary:  Negative for dysuria and frequency.   Musculoskeletal:         Lower extremity pain    Neurological:  Negative for speech difficulty, headache and confusion.   Psychiatric/Behavioral:  Positive for sleep disturbance. Negative for agitation and behavioral problems.             Physical Exam  Constitutional:       Appearance: Normal appearance.   HENT:      Right Ear: Tympanic membrane normal.      Left Ear: Tympanic membrane normal.      Nose: Nose normal.   Eyes:      Extraocular Movements: Extraocular movements intact.      Conjunctiva/sclera: Conjunctivae normal.      Pupils: Pupils are equal, round, and reactive to light.   Cardiovascular:      Rate and Rhythm: Normal rate and regular rhythm.   Pulmonary:      Effort: Pulmonary effort is normal.      Breath sounds: Normal breath sounds.   Abdominal:      General: Bowel sounds are normal.   Musculoskeletal:         General: Normal range of motion.      Cervical back: Normal range of motion.   Skin:     General: Skin is warm and dry.   Neurological:      General: No focal deficit present.      Mental Status: She is alert and oriented to person, place, and time.   Psychiatric:         Mood and Affect: Mood normal.         Behavior: Behavior normal.            Result " Review   Laboratory Studies  Labs from 08/12/2023 were reviewed with the patient.   Hemoglobin A1c was 8.1perecent.       Diagnoses and all orders for this visit:    1. Hypothyroidism, unspecified type (Primary)  -     TSH  -     T4, Free  -     T3, Free    2. Type 2 diabetes mellitus with hyperglycemia, with long-term current use of insulin  -     Continuous Blood Gluc Transmit (Dexcom G6 Transmitter) misc; Use 1 Device Continuous for 30 days.  Dispense: 1 each; Refill: 11  -     Continuous Blood Gluc Sensor (Dexcom G6 Sensor); Use Every 10 (Ten) Days for 30 days.  Dispense: 3 each; Refill: 11  -     Continuous Blood Gluc  (Dexcom G6 ) device; Use 1 Device Continuous.  Dispense: 1 each; Refill: 0  -     Hemoglobin A1c  -     Comprehensive Metabolic Panel  -     Dulaglutide (Trulicity) 0.75 MG/0.5ML solution pen-injector; Inject 0.75 mg under the skin into the appropriate area as directed 1 (One) Time Per Week for 30 days.  Dispense: 2 mL; Refill: 2    3. Hyperlipidemia, unspecified hyperlipidemia type  -     Lipid Panel    4. Generalized muscle weakness  -     Ambulatory Referral to Home Health    5. Chronic low back pain with bilateral sciatica, unspecified back pain laterality  -     Ambulatory Referral to Home Health    Type 2 Diabetes Mellitus.   - Her A1c levels have shown a gradual decrease over the past year  - However, considering her history of steroid injections for arthritis pain, it is crucial to maintain her A1c levels at 7 percent.     - I will also order hemoglobin A1c test.  - I will send a prescription for Trulicity injection. I will also send DEXCOM to monitor blood glucose.   - She will continue using insulin injections.     Urinary Tract Infection (UTI).  - I have advised the patient to seek immediate medical attention for a urine dipstick test if she experiences altered mental status.    Hypothyroidism.  - The patient's memory and processing difficulties may be exacerbated due  to her non-compliance with her thyroid medication.   - A thyroid function test will be conducted today to assess her response to the medication.  - I will liaise with the pharmacy to ascertain the specifics of the generic or name brand Synthroid that she was previously taking.    BMI is >= 25 and <30. (Overweight) The following options were offered after discussion;: nutrition counseling/recommendations       Smoking Cessation:    Sabi Haynes  reports that she has never smoked. She has never been exposed to tobacco smoke. She has never used smokeless tobacco.      Follow Up   Return in about 3 months (around 7/11/2024) for Medicare Wellness.  Patient was given instructions and counseling regarding her condition or for health maintenance advice. Please see specific information pulled into the AVS if appropriate.       Drea Villalpando MD    Transcribed from ambient dictation for Drea Villalpando MD by Louise Perera.  04/11/24   17:49 EDT    Patient or patient representative verbalized consent to the visit recording.  I have personally performed the services described in this document as transcribed by the above individual, and it is both accurate and complete.

## 2024-04-12 ENCOUNTER — TELEPHONE (OUTPATIENT)
Dept: FAMILY MEDICINE CLINIC | Facility: CLINIC | Age: 84
End: 2024-04-12
Payer: MEDICARE

## 2024-04-12 DIAGNOSIS — E03.9 HYPOTHYROIDISM, UNSPECIFIED TYPE: Primary | ICD-10-CM

## 2024-04-12 LAB
ALBUMIN SERPL-MCNC: 4.5 G/DL (ref 3.5–5.2)
ALBUMIN/GLOB SERPL: 1.5 G/DL
ALP SERPL-CCNC: 73 U/L (ref 39–117)
ALT SERPL W P-5'-P-CCNC: 12 U/L (ref 1–33)
ANION GAP SERPL CALCULATED.3IONS-SCNC: 9 MMOL/L (ref 5–15)
AST SERPL-CCNC: 18 U/L (ref 1–32)
BILIRUB SERPL-MCNC: 0.3 MG/DL (ref 0–1.2)
BUN SERPL-MCNC: 20 MG/DL (ref 8–23)
BUN/CREAT SERPL: 18.7 (ref 7–25)
CALCIUM SPEC-SCNC: 9.3 MG/DL (ref 8.6–10.5)
CHLORIDE SERPL-SCNC: 103 MMOL/L (ref 98–107)
CHOLEST SERPL-MCNC: 311 MG/DL (ref 0–200)
CO2 SERPL-SCNC: 26 MMOL/L (ref 22–29)
CREAT SERPL-MCNC: 1.07 MG/DL (ref 0.57–1)
EGFRCR SERPLBLD CKD-EPI 2021: 51.6 ML/MIN/1.73
GLOBULIN UR ELPH-MCNC: 3 GM/DL
GLUCOSE SERPL-MCNC: 130 MG/DL (ref 65–99)
HBA1C MFR BLD: 9 % (ref 4.8–5.6)
HDLC SERPL-MCNC: 42 MG/DL (ref 40–60)
LDLC SERPL CALC-MCNC: 211 MG/DL (ref 0–100)
LDLC/HDLC SERPL: 5.03 {RATIO}
POTASSIUM SERPL-SCNC: 4.6 MMOL/L (ref 3.5–5.2)
PROT SERPL-MCNC: 7.5 G/DL (ref 6–8.5)
SODIUM SERPL-SCNC: 138 MMOL/L (ref 136–145)
T3FREE SERPL-MCNC: 1.34 PG/ML (ref 2–4.4)
T4 FREE SERPL-MCNC: 0.25 NG/DL (ref 0.93–1.7)
TRIGL SERPL-MCNC: 288 MG/DL (ref 0–150)
TSH SERPL DL<=0.05 MIU/L-ACNC: 65.3 UIU/ML (ref 0.27–4.2)
VLDLC SERPL-MCNC: 58 MG/DL (ref 5–40)

## 2024-04-12 RX ORDER — LEVOTHYROXINE SODIUM 0.12 MG/1
125 TABLET ORAL DAILY
Qty: 90 TABLET | Refills: 0 | Status: SHIPPED | OUTPATIENT
Start: 2024-04-12 | End: 2024-07-11

## 2024-04-12 NOTE — TELEPHONE ENCOUNTER
Unsuccessful contact.  Left message requesting patient to call office regarding labs and Dr. Villalpando's plan of care.

## 2024-04-15 ENCOUNTER — TELEPHONE (OUTPATIENT)
Dept: FAMILY MEDICINE CLINIC | Facility: CLINIC | Age: 84
End: 2024-04-15

## 2024-04-15 NOTE — TELEPHONE ENCOUNTER
Caller:  ANTONIO     Relationship:  HOME HEALTH     Best call back number:  [phone]     Who are you requesting to speak with (clinical staff, provider,  specific staff member):  CLINICAL      What was the call regarding:   HOME HEALTH IS STATING THAT ACCORDING TO PLAN OF CARE, HOME HEALTH WAS SUPPOSED TO START THIS MORNING 04/15/2024 BUT PATIENT REQUESTED A WEDNESDAY START.

## 2024-05-02 ENCOUNTER — TELEPHONE (OUTPATIENT)
Dept: FAMILY MEDICINE CLINIC | Facility: CLINIC | Age: 84
End: 2024-05-02
Payer: MEDICARE

## 2024-05-06 DIAGNOSIS — E11.9 TYPE 2 DIABETES MELLITUS WITH HEMOGLOBIN A1C GOAL OF LESS THAN 7.0%: Primary | ICD-10-CM

## 2024-05-06 RX ORDER — KETOROLAC TROMETHAMINE 30 MG/ML
1 INJECTION, SOLUTION INTRAMUSCULAR; INTRAVENOUS CONTINUOUS
Qty: 1 EACH | Refills: 0 | Status: SHIPPED | OUTPATIENT
Start: 2024-05-06

## 2024-05-06 RX ORDER — BLOOD-GLUCOSE SENSOR
1 EACH MISCELLANEOUS CONTINUOUS
Qty: 1 EACH | Refills: 0 | Status: SHIPPED | OUTPATIENT
Start: 2024-05-06

## 2024-05-09 ENCOUNTER — TELEPHONE (OUTPATIENT)
Dept: FAMILY MEDICINE CLINIC | Facility: CLINIC | Age: 84
End: 2024-05-09
Payer: MEDICARE

## 2024-06-04 DIAGNOSIS — Z79.4 TYPE 2 DIABETES MELLITUS WITH HYPERGLYCEMIA, WITH LONG-TERM CURRENT USE OF INSULIN: ICD-10-CM

## 2024-06-04 DIAGNOSIS — E11.65 TYPE 2 DIABETES MELLITUS WITH HYPERGLYCEMIA, WITH LONG-TERM CURRENT USE OF INSULIN: ICD-10-CM

## 2024-06-04 RX ORDER — INSULIN GLARGINE 100 [IU]/ML
50 INJECTION, SOLUTION SUBCUTANEOUS EVERY EVENING
Start: 2024-06-04 | End: 2024-06-06 | Stop reason: SDUPTHER

## 2024-06-04 RX ORDER — ISOPROPYL ALCOHOL 70 ML/100ML
1 SWAB TOPICAL 3 TIMES DAILY
Qty: 100 EACH | Refills: 5 | Status: SHIPPED | OUTPATIENT
Start: 2024-06-04

## 2024-06-04 NOTE — TELEPHONE ENCOUNTER
Caller: Sabi Haynes    Relationship: Self    Best call back number: [phone]     Requested Prescriptions:   Requested Prescriptions     Pending Prescriptions Disp Refills    Insulin Glargine (Lantus SoloStar) 100 UNIT/ML injection pen       Sig: Inject 50 Units under the skin into the appropriate area as directed Every Evening for 90 days.    Insulin Pen Needle 31G X 5 MM misc 100 each 3     Sig: Use as directed    Alcohol Swabs (Easy Touch Alcohol Prep Medium) 70 % pads 100 each 5     Si application  by Epidural route 3 (Three) Times a Day.        Pharmacy where request should be sent: [facility] [phone] [place], [place] [phone]      Last office visit with prescribing clinician: 2024   Last telemedicine visit with prescribing clinician: Visit date not found   Next office visit with prescribing clinician: 2024     Additional details provided by patient: PATIENT IS REQUESTING FOR REFILLS ON THE SCRIPT.     Does the patient have less than a 3 day supply:  [x] Yes  [] No    Would you like a call back once the refill request has been completed: [] Yes [x] No    If the office needs to give you a call back, can they leave a voicemail: [] Yes [x] No    Suzanne Kimble   24 14:37 EDT

## 2024-06-06 DIAGNOSIS — Z79.4 TYPE 2 DIABETES MELLITUS WITH HYPERGLYCEMIA, WITH LONG-TERM CURRENT USE OF INSULIN: ICD-10-CM

## 2024-06-06 DIAGNOSIS — E11.65 TYPE 2 DIABETES MELLITUS WITH HYPERGLYCEMIA, WITH LONG-TERM CURRENT USE OF INSULIN: ICD-10-CM

## 2024-06-06 RX ORDER — INSULIN GLARGINE 100 [IU]/ML
50 INJECTION, SOLUTION SUBCUTANEOUS EVERY EVENING
Qty: 15 ML | Refills: 2 | Status: SHIPPED | OUTPATIENT
Start: 2024-06-06 | End: 2024-06-06 | Stop reason: SDUPTHER

## 2024-06-06 RX ORDER — INSULIN GLARGINE 100 [IU]/ML
50 INJECTION, SOLUTION SUBCUTANEOUS EVERY EVENING
Qty: 45 ML | Refills: 3 | Status: SHIPPED | OUTPATIENT
Start: 2024-06-06 | End: 2024-09-04

## 2024-08-01 ENCOUNTER — OFFICE VISIT (OUTPATIENT)
Dept: FAMILY MEDICINE CLINIC | Facility: CLINIC | Age: 84
End: 2024-08-01
Payer: MEDICARE

## 2024-08-01 VITALS
HEIGHT: 66 IN | SYSTOLIC BLOOD PRESSURE: 130 MMHG | DIASTOLIC BLOOD PRESSURE: 80 MMHG | BODY MASS INDEX: 27.98 KG/M2 | WEIGHT: 174.1 LBS | HEART RATE: 90 BPM | TEMPERATURE: 98.1 F | OXYGEN SATURATION: 97 %

## 2024-08-01 DIAGNOSIS — Z79.4 TYPE 2 DIABETES MELLITUS WITH HYPERGLYCEMIA, WITH LONG-TERM CURRENT USE OF INSULIN: ICD-10-CM

## 2024-08-01 DIAGNOSIS — E11.65 TYPE 2 DIABETES MELLITUS WITH HYPERGLYCEMIA, WITH LONG-TERM CURRENT USE OF INSULIN: ICD-10-CM

## 2024-08-01 DIAGNOSIS — Z00.00 ENCOUNTER FOR SUBSEQUENT ANNUAL WELLNESS VISIT (AWV) IN MEDICARE PATIENT: ICD-10-CM

## 2024-08-01 DIAGNOSIS — E03.9 HYPOTHYROIDISM, UNSPECIFIED TYPE: ICD-10-CM

## 2024-08-01 DIAGNOSIS — M62.81 GENERALIZED MUSCLE WEAKNESS: Primary | ICD-10-CM

## 2024-08-01 LAB
ALBUMIN SERPL-MCNC: 4.3 G/DL (ref 3.5–5.2)
ALBUMIN/GLOB SERPL: 1.3 G/DL
ALP SERPL-CCNC: 66 U/L (ref 39–117)
ALT SERPL W P-5'-P-CCNC: 9 U/L (ref 1–33)
ANION GAP SERPL CALCULATED.3IONS-SCNC: 10.6 MMOL/L (ref 5–15)
AST SERPL-CCNC: 18 U/L (ref 1–32)
BILIRUB SERPL-MCNC: 0.3 MG/DL (ref 0–1.2)
BUN SERPL-MCNC: 17 MG/DL (ref 8–23)
BUN/CREAT SERPL: 15.7 (ref 7–25)
CALCIUM SPEC-SCNC: 9.4 MG/DL (ref 8.6–10.5)
CHLORIDE SERPL-SCNC: 107 MMOL/L (ref 98–107)
CHOLEST SERPL-MCNC: 198 MG/DL (ref 0–200)
CO2 SERPL-SCNC: 22.4 MMOL/L (ref 22–29)
CREAT SERPL-MCNC: 1.08 MG/DL (ref 0.57–1)
EGFRCR SERPLBLD CKD-EPI 2021: 51.1 ML/MIN/1.73
GLOBULIN UR ELPH-MCNC: 3.2 GM/DL
GLUCOSE SERPL-MCNC: 113 MG/DL (ref 65–99)
HBA1C MFR BLD: 7.7 % (ref 4.8–5.6)
HDLC SERPL-MCNC: 35 MG/DL (ref 40–60)
LDLC SERPL CALC-MCNC: 130 MG/DL (ref 0–100)
LDLC/HDLC SERPL: 3.61 {RATIO}
POTASSIUM SERPL-SCNC: 4.6 MMOL/L (ref 3.5–5.2)
PROT SERPL-MCNC: 7.5 G/DL (ref 6–8.5)
SODIUM SERPL-SCNC: 140 MMOL/L (ref 136–145)
TRIGL SERPL-MCNC: 184 MG/DL (ref 0–150)
TSH SERPL DL<=0.05 MIU/L-ACNC: 0.34 UIU/ML (ref 0.27–4.2)
VLDLC SERPL-MCNC: 33 MG/DL (ref 5–40)

## 2024-08-01 PROCEDURE — 83036 HEMOGLOBIN GLYCOSYLATED A1C: CPT | Performed by: FAMILY MEDICINE

## 2024-08-01 PROCEDURE — 80061 LIPID PANEL: CPT | Performed by: FAMILY MEDICINE

## 2024-08-01 PROCEDURE — 80053 COMPREHEN METABOLIC PANEL: CPT | Performed by: FAMILY MEDICINE

## 2024-08-01 PROCEDURE — 84443 ASSAY THYROID STIM HORMONE: CPT | Performed by: FAMILY MEDICINE

## 2024-08-01 RX ORDER — KETOROLAC TROMETHAMINE 30 MG/ML
1 INJECTION, SOLUTION INTRAMUSCULAR; INTRAVENOUS CONTINUOUS
Qty: 1 EACH | Refills: 0 | Status: SHIPPED | OUTPATIENT
Start: 2024-08-01

## 2024-08-01 RX ORDER — BLOOD-GLUCOSE SENSOR
1 EACH MISCELLANEOUS CONTINUOUS
Qty: 1 EACH | Refills: 0 | Status: SHIPPED | OUTPATIENT
Start: 2024-08-01

## 2024-08-01 RX ORDER — LEVOTHYROXINE SODIUM 0.12 MG/1
125 TABLET ORAL DAILY
Qty: 90 TABLET | Refills: 3 | Status: SHIPPED | OUTPATIENT
Start: 2024-08-01 | End: 2024-10-30

## 2024-08-01 NOTE — PROGRESS NOTES
Subjective   The ABCs of the Annual Wellness Visit  Medicare Wellness Visit      Sabi Haynes is a 83 y.o. patient who presents for a Medicare Wellness Visit.    The following portions of the patient's history were reviewed and   updated as appropriate: allergies, current medications, past family history, past medical history, past social history, past surgical history, and problem list.    Compared to one year ago, the patient's physical   health is worse.  Compared to one year ago, the patient's mental   health is the same.    Recent Hospitalizations:  She was admitted within the past 365 days at [facility].     Current Medical Providers:  Patient Care Team:  Drea Villalpando MD as PCP - General (Family Medicine)  Marito Peace MD    Outpatient Medications Prior to Visit   Medication Sig Dispense Refill    Alcohol Swabs (Easy Touch Alcohol Prep Medium) 70 % pads 1 application  by Epidural route 3 (Three) Times a Day. 100 each 5    Ascorbic Acid 100 MG chewable tablet       Cholecalciferol (VITAMIN D3) 5000 units capsule capsule Take 1 capsule by mouth Daily.      ezetimibe (Zetia) 10 MG tablet Take 1 tablet by mouth Daily for 360 days. 90 tablet 3    fluticasone (FLONASE) 50 MCG/ACT nasal spray 2 sprays into the nostril(s) as directed by provider Daily. 18.2 mL 0    glucose blood (FREESTYLE LITE) test strip       Insulin Glargine (Lantus SoloStar) 100 UNIT/ML injection pen Inject 50 Units under the skin into the appropriate area as directed Every Evening for 90 days. 45 mL 3    Insulin Pen Needle 31G X 5 MM misc Use as directed 100 each 3    Lancets (freestyle) lancets Prick finger to check blood sugars up to 3 times a day 100 each 5    vitamin B-12 (CYANOCOBALAMIN) 500 MCG tablet Take 1 tablet by mouth Daily.      Continuous Glucose  (FreeStyle Nunu 3 [place]) device Use 1 each Continuous. 1 each 0    Continuous Glucose Sensor (FreeStyle Nunu 3 Sensor) misc Use 1 each Continuous. 1 each 0  "   levothyroxine (Synthroid) 125 MCG tablet Take 1 tablet by mouth Daily for 90 days. 90 tablet 0     No facility-administered medications prior to visit.     No opioid medication identified on active medication list. I have reviewed chart for other potential  high risk medication/s and harmful drug interactions in the elderly.      Aspirin is not on active medication list.  Aspirin use is contraindicated for this patient due to: increased fall risk.  .    Patient Active Problem List   Diagnosis    Renal calculus, left    Rotator cuff tear    S/P rotator cuff surgery    Bladder disorder    Hyperlipemia    Hypertension    Diabetes mellitus    Aftercare following right shoulder arthroscopy    Chronic constipation    Swelling of upper arm    Vitamin B12 deficiency    Right shoulder pain    Arthritis    Onychomycosis    Dysuria    Urinary tract infection with hematuria    Pain in right upper arm    Hyperlipidemia    Type 2 diabetes mellitus with hyperglycemia, with long-term current use of insulin     Advance Care Planning (Click this link to access ACP Navigator)  Advance Directive is not on file.  ACP discussion was held with the patient during this visit. Patient does not have an advance directive, information provided.        Objective   Vitals:    08/01/24 1454   BP: 130/80   BP Location: Left arm   Patient Position: Sitting   Cuff Size: Adult   Pulse: 90   Temp: 98.1 °F (36.7 °C)   TempSrc: Oral   SpO2: 97%   Weight: 79 kg (174 lb 1.6 oz)   Height: 167.6 cm (66\")       Estimated body mass index is 28.1 kg/m² as calculated from the following:    Height as of this encounter: 167.6 cm (66\").    Weight as of this encounter: 79 kg (174 lb 1.6 oz).             Does the patient have evidence of cognitive impairment? No  Lab Results   Component Value Date    TRIG 184 (H) 08/01/2024    HDL 35 (L) 08/01/2024     (H) 08/01/2024    VLDL 33 08/01/2024    HGBA1C 7.70 (H) 08/01/2024                                         "                                                        Health  Risk Assessment    Smoking Status:  Social History     Tobacco Use   Smoking Status Never    Passive exposure: Never   Smokeless Tobacco Never     Alcohol Consumption:  Social History     Substance and Sexual Activity   Alcohol Use No     Fall Risk Screen:  KASSANDRAADI Fall Risk Assessment was completed, and patient is at LOW risk for falls.Assessment completed on:2024    Depression Screenin/1/2024     2:50 PM   PHQ-2/PHQ-9 Depression Screening   Little Interest or Pleasure in Doing Things 0-->not at all   Feeling Down, Depressed or Hopeless 0-->not at all   PHQ-9: Brief Depression Severity Measure Score 0     Health Habits and Functional and Cognitive Screenin/1/2024     2:51 PM   Functional & Cognitive Status   Do you have difficulty preparing food and eating? No   Do you have difficulty bathing yourself, getting dressed or grooming yourself? No   Do you have difficulty using the toilet? No   Do you have difficulty moving around from place to place? No   Do you have trouble with steps or getting out of a bed or a chair? No   Current Diet Well Balanced Diet   Dental Exam Up to date   Eye Exam Up to date   Exercise (times per week) 3 times per week   Current Exercises Include Walking   Do you need help using the phone?  No   Are you deaf or do you have serious difficulty hearing?  No   Do you need help to go to places out of walking distance? No   Do you need help shopping? No   Do you need help preparing meals?  No   Do you need help with housework?  No   Do you need help with laundry? No   Do you need help taking your medications? No   Do you need help managing money? No   Do you ever drive or ride in a car without wearing a seat belt? No   Have you felt unusual stress, anger or loneliness in the last month? No   Who do you live with? Alone   If you need help, do you have trouble finding someone available to you? No   Have you been  bothered in the last four weeks by sexual problems? No   Do you have difficulty concentrating, remembering or making decisions? No             Age-appropriate Screening Schedule:  Refer to the list below for future screening recommendations based on patient's age, sex and/or medical conditions. Orders for these recommended tests are listed in the plan section. The patient has been provided with a written plan.    Health Maintenance List  Health Maintenance   Topic Date Due    DXA SCAN  Never done    Pneumococcal Vaccine 65+ (1 of 2 - PCV) Never done    ZOSTER VACCINE (1 of 2) Never done    RSV Vaccine - Adults (1 - 1-dose 60+ series) Never done    URINE MICROALBUMIN  09/27/2020    DIABETIC EYE EXAM  02/04/2021    COVID-19 Vaccine (4 - 2023-24 season) 09/01/2023    INFLUENZA VACCINE  08/01/2024    HEMOGLOBIN A1C  02/01/2025    BMI FOLLOWUP  04/11/2025    ANNUAL WELLNESS VISIT  08/01/2025    LIPID PANEL  08/01/2025    TDAP/TD VACCINES (3 - Td or Tdap) 08/16/2032                                                                                                                                                CMS Preventative Services Quick Reference  Risk Factors Identified During Encounter  Fall Risk-High or Moderate: Discussed Fall Prevention in the home    The above risks/problems have been discussed with the patient.  Pertinent information has been shared with the patient in the After Visit Summary.  An After Visit Summary and PPPS were made available to the patient.    Follow Up:  Next Medicare Wellness visit to be scheduled in 1 year.         Additional E&M Note during same encounter follows:  Patient has additional, significant, and separately identifiable condition(s)/problem(s) that require work above and beyond the Medicare Wellness Visit     Chief Complaint  Medicare Wellness-subsequent (No concerns/)    Subjective   HPI  Sabi is also being seen today for additional medical problem/s.       The patient  "presents for evaluation of multiple medical concerns.    The patient expresses a desire to lose an additional 5 pounds. She was previously on Synthroid 110 mcg, which was discontinued due to severe hives. Despite this, she continued to experience progressive weakness and fatigue, leading her to resume the medication. She was initially prescribed 125 mcg of Synthroid 6 months ago, but was later prescribed 112 mcg or 112 mcg.    The patient received a letter from [employer] regarding her wheelchair and patch, which she was informed was not sufficiently suitable for her. Despite being capable of lying down at home without the wheelchair, she requires a wheelchair when shopping. Her granddaughter suspects she may have dementia, prompting a test, which yielded a score of 11. Physical therapy was not deemed necessary at that time. Her mobility is limited, as she is unable to walk long distances in the grocery stores. She is currently fasting and has a mild headache. She has a history of spinal stenosis, which is causing significant discomfort. She has lipomas throughout her body, including a few on her spine. Her spine specialist, Dr. Sullivan, in [place], recommended epidural injections.    Supplemental Information  She had a urinary tract infection in 02/2024.          Objective   Vital Signs:  /80 (BP Location: Left arm, Patient Position: Sitting, Cuff Size: Adult)   Pulse 90   Temp 98.1 °F (36.7 °C) (Oral)   Ht 167.6 cm (66\")   Wt 79 kg (174 lb 1.6 oz)   SpO2 97%   BMI 28.10 kg/m²   Physical Exam  Vitals reviewed.   Constitutional:       Appearance: Normal appearance.   HENT:      Right Ear: Tympanic membrane normal.      Left Ear: Tympanic membrane normal.      Nose: Nose normal.   Eyes:      Extraocular Movements: Extraocular movements intact.      Conjunctiva/sclera: Conjunctivae normal.      Pupils: Pupils are equal, round, and reactive to light.   Cardiovascular:      Rate and Rhythm: Normal " rate and regular rhythm.   Pulmonary:      Effort: Pulmonary effort is normal.      Breath sounds: Normal breath sounds.   Abdominal:      General: Bowel sounds are normal.   Musculoskeletal:         General: Normal range of motion.      Cervical back: Normal range of motion.   Skin:     General: Skin is warm and dry.   Neurological:      General: No focal deficit present.      Mental Status: She is alert and oriented to person, place, and time.   Psychiatric:         Mood and Affect: Mood normal.         Behavior: Behavior normal.           Clear lungs.    The following data was reviewed by: Drea Villalpando MD on 08/01/2024:         Assessment and PlanAdditional age appropriate preventative wellness advice topics were discussed during today's preventative wellness exam(some topics already addressed during AWV portion of the note above):    Physical Activity: Advised cardiovascular activity 150 minutes per week as tolerated. (example brisk walk for 30 minutes, 5 days a week).          1. Hypothyroidism.  Laboratory tests will be conducted today. A refill of her thyroid medication will be provided.    2. Wheelchair.  An electric wheelchair or mobility scooter will be arranged for her.    Follow-up  A follow-up appointment is scheduled for 3 months from now.       Generalized muscle weakness    Type 2 diabetes mellitus with hyperglycemia, with long-term current use of insulin  Diabetes is stable.   Continue current treatment regimen.  Diabetes will be reassessed in 3 months  Hypothyroidism, unspecified type    Encounter for subsequent annual wellness visit (AWV) in Medicare patient      Orders Placed This Encounter   Procedures    TSH     Order Specific Question:   Release to patient     Answer:   Routine Release [[id number]]    Hemoglobin A1c     Order Specific Question:   Release to patient     Answer:   Routine Release [[id number]]    Comprehensive Metabolic Panel     Order Specific Question:   Release to patient      Answer:   Routine Release [[id number]]    Lipid Panel     Order Specific Question:   Release to patient     Answer:   Routine Release [[id number]]    Ambulatory Referral to Home Health     Referral Priority:   Routine     Referral Type:   Home Health     Referral Reason:   Specialty Services Required     Requested Specialty:   Home Health Services     Number of Visits Requested:   999     New Medications Ordered This Visit   Medications    Continuous Glucose  (FreeStyle Nunu 3 [place]) device     Sig: Use 1 each Continuous.     Dispense:  1 each     Refill:  0    Continuous Glucose Sensor (FreeStyle Nunu 3 Sensor) misc     Sig: Use 1 each Continuous.     Dispense:  1 each     Refill:  0    levothyroxine (Synthroid) 125 MCG tablet     Sig: Take 1 tablet by mouth Daily for 90 days.     Dispense:  90 tablet     Refill:  3        I spent 35 minutes caring for Sabi on this date of service. This time includes time spent by me in the following activities:reviewing tests  Follow Up   Return in about 3 months (around 11/1/2024).  Patient was given instructions and counseling regarding her condition or for health maintenance advice. Please see specific information pulled into the AVS if appropriate.  Patient or patient representative verbalized consent for the use of Ambient Listening during the visit with  Drea Villalpando MD for chart documentation. 8/18/2024  23:24 EDT

## 2024-10-24 ENCOUNTER — OFFICE VISIT (OUTPATIENT)
Dept: FAMILY MEDICINE CLINIC | Facility: CLINIC | Age: 84
End: 2024-10-24
Payer: MEDICARE

## 2024-10-24 VITALS
OXYGEN SATURATION: 99 % | SYSTOLIC BLOOD PRESSURE: 140 MMHG | DIASTOLIC BLOOD PRESSURE: 70 MMHG | BODY MASS INDEX: 28.8 KG/M2 | HEIGHT: 66 IN | HEART RATE: 76 BPM | WEIGHT: 179.2 LBS | TEMPERATURE: 98.3 F

## 2024-10-24 DIAGNOSIS — E11.65 TYPE 2 DIABETES MELLITUS WITH HYPERGLYCEMIA, WITH LONG-TERM CURRENT USE OF INSULIN: ICD-10-CM

## 2024-10-24 DIAGNOSIS — E78.2 MIXED HYPERLIPIDEMIA: ICD-10-CM

## 2024-10-24 DIAGNOSIS — Z79.4 TYPE 2 DIABETES MELLITUS WITH HYPERGLYCEMIA, WITH LONG-TERM CURRENT USE OF INSULIN: ICD-10-CM

## 2024-10-24 DIAGNOSIS — I10 PRIMARY HYPERTENSION: Primary | ICD-10-CM

## 2024-10-24 DIAGNOSIS — E03.9 HYPOTHYROIDISM, UNSPECIFIED TYPE: ICD-10-CM

## 2024-10-24 DIAGNOSIS — N39.0 URINARY TRACT INFECTION WITHOUT HEMATURIA, SITE UNSPECIFIED: ICD-10-CM

## 2024-10-24 LAB
ALBUMIN UR-MCNC: <1.2 MG/DL
BILIRUB BLD-MCNC: NEGATIVE MG/DL
CLARITY, POC: CLEAR
COLOR UR: YELLOW
GLUCOSE UR STRIP-MCNC: NEGATIVE MG/DL
KETONES UR QL: NEGATIVE
LEUKOCYTE EST, POC: ABNORMAL
NITRITE UR-MCNC: POSITIVE MG/ML
PH UR: 5.5 [PH] (ref 5–8)
PROT UR STRIP-MCNC: NEGATIVE MG/DL
RBC # UR STRIP: NEGATIVE /UL
SP GR UR: 1.02 (ref 1–1.03)
UROBILINOGEN UR QL: ABNORMAL

## 2024-10-24 PROCEDURE — 3078F DIAST BP <80 MM HG: CPT | Performed by: FAMILY MEDICINE

## 2024-10-24 PROCEDURE — G2211 COMPLEX E/M VISIT ADD ON: HCPCS | Performed by: FAMILY MEDICINE

## 2024-10-24 PROCEDURE — 87086 URINE CULTURE/COLONY COUNT: CPT | Performed by: FAMILY MEDICINE

## 2024-10-24 PROCEDURE — 81002 URINALYSIS NONAUTO W/O SCOPE: CPT | Performed by: FAMILY MEDICINE

## 2024-10-24 PROCEDURE — 1125F AMNT PAIN NOTED PAIN PRSNT: CPT | Performed by: FAMILY MEDICINE

## 2024-10-24 PROCEDURE — 3077F SYST BP >= 140 MM HG: CPT | Performed by: FAMILY MEDICINE

## 2024-10-24 PROCEDURE — 87186 SC STD MICRODIL/AGAR DIL: CPT | Performed by: FAMILY MEDICINE

## 2024-10-24 PROCEDURE — 82043 UR ALBUMIN QUANTITATIVE: CPT | Performed by: FAMILY MEDICINE

## 2024-10-24 PROCEDURE — 87077 CULTURE AEROBIC IDENTIFY: CPT | Performed by: FAMILY MEDICINE

## 2024-10-24 PROCEDURE — 99214 OFFICE O/P EST MOD 30 MIN: CPT | Performed by: FAMILY MEDICINE

## 2024-10-24 RX ORDER — LEVOTHYROXINE SODIUM 112 UG/1
112 TABLET ORAL DAILY
Qty: 90 TABLET | Refills: 3 | Status: SHIPPED | OUTPATIENT
Start: 2024-10-24 | End: 2025-01-22

## 2024-10-24 NOTE — PROGRESS NOTES
Chief Complaint  Eye Problem (Took self off of thyroid medication/Making eye sight worse)    Subjective        Sabi Haynes presents to [facility]  History of Present Illness  The patient is an 83-year-old female who presents for evaluation of multiple medical concerns.    She has been taking vitamin C gummies, two per day, and reports a significant improvement in her eyesight since discontinuing her thyroid medication. She also takes vitamin B6, B12, and folic acid, which she believes are beneficial for her nails. An ophthalmologist had warned her of potential blindness due to age-related vision loss. However, she reports improved vision since stopping the levothyroxine, although she still experiences black spots in her vision, particularly in dark rooms. She has been receiving eye injections for several years but stopped after a hospital stay due to a stroke.    She has resumed taking levothyroxine, which was previously discontinued due to a change in the generic brand that she felt worsened her eyesight. She has experienced episodes of weakness and difficulty walking, which she attributes to her thyroid medication. She has noticed a lump in her neck, which she is concerned may be related to her thyroid.    She consumes blueberries, which she believes help lower her blood pressure. She reports occasional hives, which she suspects may be related to a sugar substitute she was using in her coffee. She has since switched to honey.    She has a history of kidney stones and urinary tract infections and currently has an InterStim device for bladder control, which is currently turned off. She reports being able to urinate on her own but still needs to self-catheterize. She drinks plenty of water and has not had any recent kidney issues.        Medical History: has a past medical history of Acid reflux, Allergic (2002), Arthritis, Bladder disorder, Bladder paralysis, Blind spot scotoma,  left, Condition not found, Coronary artery disease (1975), DDD (degenerative disc disease), cervical, DDD (degenerative disc disease), lumbar, Diabetes mellitus, Gastric ulcer, Hearing loss, Heart attack, Heart disease, Hemorrhoids, History of confusion, History of migraine headaches, Hyperlipemia, Hyperlipidemia, Hypothyroidism, Incontinence of bowel, Incontinence of urine, Kidney disease, Left hand pain, Limb swelling, Macular degeneration, PONV (postoperative nausea and vomiting), Primary osteoarthritis of right knee (08/24/2018), Recent total retinal detachment, Renal calculi, Renal insufficiency (1965), Right BBB/left ant fasc block, Ringing in ears, Rotator cuff tear (06/17/2021), S/P arthroscopic partial medial meniscectomy (12/08/2017), S/P medial meniscectomy of right knee (10/11/2017), Seasonal allergies, Self-catheterizes urinary bladder, Short-term memory loss, Shortness of breath, Spinal stenosis, Stroke, Thyroid disorder, and Vitamin D deficiency.   Surgical History: has a past surgical history that includes Varicose vein surgery; Cholecystectomy; Hysterectomy; Laparoscopic tubal ligation; Tear duct surgery; Extracorporeal shock wave lithotripsy (Left, 9/13/2019); Other surgical history; Bladder surgery (2002); Carpal tunnel release; Colonoscopy; Cyst Removal; Gallbladder surgery; Total hip arthroplasty (2010); Hysterectomy (1969); Knee surgery (08/22); Eye surgery; Joint replacement; and Shoulder arthroscopy w/ rotator cuff repair (Right, 6/17/2021).   Family History: family history includes Arthritis in her father and mother; Cancer in her father; Diabetes in her mother; Heart disease in her mother; Leukemia in her father; Osteoporosis in her brother; Stroke in her son.   Social History: reports that she has never smoked. She has never been exposed to tobacco smoke. She has never used smokeless tobacco. She reports that she does not drink alcohol and does not use drugs.  Immunization History  "  Administered Date(s) Administered    COVID-19 (PFIZER) Purple Cap Monovalent 05/14/2021, 06/04/2021    Covid-19 (Pfizer) Gray Cap Monovalent 01/19/2022    PPD Test 06/22/2021    Tdap 04/11/2017, 08/16/2022       Objective   Vital Signs:  /70 (BP Location: Left arm, Patient Position: Sitting, Cuff Size: Adult)   Pulse 76   Temp 98.3 °F (36.8 °C) (Oral)   Ht 167.6 cm (66\")   Wt 81.3 kg (179 lb 3.2 oz)   SpO2 99%   BMI 28.92 kg/m²   Estimated body mass index is 28.92 kg/m² as calculated from the following:    Height as of this encounter: 167.6 cm (66\").    Weight as of this encounter: 81.3 kg (179 lb 3.2 oz).             ROS:  Review of Systems   Constitutional:  Negative for fatigue and fever.   HENT:  Negative for congestion, ear pain and sinus pressure.    Eyes:  Positive for visual disturbance.   Respiratory:  Negative for cough, chest tightness and shortness of breath.    Cardiovascular:  Negative for chest pain, palpitations and leg swelling.   Gastrointestinal:  Negative for abdominal pain and diarrhea.   Genitourinary:  Negative for dysuria and frequency.   Neurological:  Negative for speech difficulty, headache and confusion.   Psychiatric/Behavioral:  Negative for agitation and behavioral problems.       Physical Exam  Vitals reviewed.   Constitutional:       Appearance: Normal appearance.   HENT:      Right Ear: Tympanic membrane normal.      Left Ear: Tympanic membrane normal.      Nose: Nose normal.   Eyes:      Extraocular Movements: Extraocular movements intact.      Conjunctiva/sclera: Conjunctivae normal.      Pupils: Pupils are equal, round, and reactive to light.   Cardiovascular:      Rate and Rhythm: Normal rate and regular rhythm.   Pulmonary:      Effort: Pulmonary effort is normal.      Breath sounds: Normal breath sounds.   Abdominal:      General: Bowel sounds are normal.   Musculoskeletal:         General: Normal range of motion.      Cervical back: Normal range of motion. "   Skin:     General: Skin is warm and dry.   Neurological:      General: No focal deficit present.      Mental Status: She is alert and oriented to person, place, and time.   Psychiatric:         Mood and Affect: Mood normal.         Behavior: Behavior normal.       Physical Exam        Result Review   Comprehensive Metabolic Panel (08/01/2024 16:06)   Hemoglobin A1c (08/01/2024 16:06)   Lipid Panel (08/01/2024 16:06)   Results  Laboratory Studies  A1c dropped from 9 to 7.7.             Assessment and Plan    Diagnoses and all orders for this visit:    1. Primary hypertension (Primary)    2. Type 2 diabetes mellitus with hyperglycemia, with long-term current use of insulin  -     POCT urinalysis dipstick, manual  -     MicroAlbumin, Urine, Random - Urine, Clean Catch    3. Mixed hyperlipidemia    4. Urinary tract infection without hematuria, site unspecified  -     Urine Culture - Urine, Urine, Clean Catch    5. Hypothyroidism, unspecified type    Other orders  -     levothyroxine (Synthroid) 112 MCG tablet; Take 1 tablet by mouth Daily for 90 days.  Dispense: 90 tablet; Refill: 3      Assessment & Plan  1. Diabetes Mellitus.  Her A1C has shown a significant decrease from 9 to 7.7. She is advised to continue her current regimen, including a balanced diet with blueberries, as it appears to be effective.    2. Thyroid Disorder.  She reports improved vision after discontinuing her thyroid medication. However, she is concerned about potential thyroid issues, including feeling knots in her neck. A prescription for levothyroxine 112 mcg will be sent to [facility]. She is advised to monitor her symptoms and report any changes.    3. Vision Problems.  She reports black spots and worsening vision, which improved after stopping her thyroid medication. She is advised to follow up with her eye doctor for further evaluation and management.    4. Kidney Stones.  She continues to experience kidney stones and has been catheterizing  herself. A urine dipstick test will be performed today to check for any infections. She is advised to drink plenty of water and monitor for any symptoms of infection.    5. Medication Management.  She has resumed taking her vitamin B12 and vitamin D for bone health. She is advised to continue these supplements as they are beneficial for her overall health.         I spent 35 minutes caring for Sabi on this date of service. This time includes time spent by me in the following activities:reviewing tests  Follow Up   Return in about 3 months (around 1/24/2025).  Patient was given instructions and counseling regarding her condition or for health maintenance advice. Please see specific information pulled into the AVS if appropriate.   Patient or patient representative verbalized consent for the use of Ambient Listening during the visit with  Drea Villalpando MD for chart documentation. 10/24/2024  12:22 EDT    Drea Villalpando MD

## 2024-10-26 LAB — BACTERIA SPEC AEROBE CULT: ABNORMAL

## 2024-10-28 ENCOUNTER — TELEPHONE (OUTPATIENT)
Dept: FAMILY MEDICINE CLINIC | Facility: CLINIC | Age: 84
End: 2024-10-28
Payer: MEDICARE

## 2024-10-28 RX ORDER — NITROFURANTOIN 25; 75 MG/1; MG/1
100 CAPSULE ORAL 2 TIMES DAILY
Qty: 14 CAPSULE | Refills: 0 | Status: SHIPPED | OUTPATIENT
Start: 2024-10-28 | End: 2024-11-04

## 2024-10-28 NOTE — TELEPHONE ENCOUNTER
Called and spoke with  patient and advised her that per Dr. Villalpando she has a uti per her urine culture results and she sent in macrobid for the patient.  She verbally stated understanding.

## 2025-01-24 ENCOUNTER — OFFICE VISIT (OUTPATIENT)
Dept: FAMILY MEDICINE CLINIC | Facility: CLINIC | Age: 85
End: 2025-01-24
Payer: MEDICARE

## 2025-01-24 VITALS
OXYGEN SATURATION: 100 % | SYSTOLIC BLOOD PRESSURE: 130 MMHG | WEIGHT: 177 LBS | TEMPERATURE: 98.1 F | HEART RATE: 94 BPM | DIASTOLIC BLOOD PRESSURE: 90 MMHG | HEIGHT: 66 IN | BODY MASS INDEX: 28.45 KG/M2

## 2025-01-24 DIAGNOSIS — R31.9 URINARY TRACT INFECTION WITH HEMATURIA, SITE UNSPECIFIED: ICD-10-CM

## 2025-01-24 DIAGNOSIS — E11.9 TYPE 2 DIABETES MELLITUS WITHOUT COMPLICATION, WITH LONG-TERM CURRENT USE OF INSULIN: Primary | ICD-10-CM

## 2025-01-24 DIAGNOSIS — N39.0 RECURRENT UTI (URINARY TRACT INFECTION): ICD-10-CM

## 2025-01-24 DIAGNOSIS — Z79.4 TYPE 2 DIABETES MELLITUS WITHOUT COMPLICATION, WITH LONG-TERM CURRENT USE OF INSULIN: Primary | ICD-10-CM

## 2025-01-24 DIAGNOSIS — H61.23 BILATERAL IMPACTED CERUMEN: ICD-10-CM

## 2025-01-24 DIAGNOSIS — N39.0 URINARY TRACT INFECTION WITH HEMATURIA, SITE UNSPECIFIED: ICD-10-CM

## 2025-01-24 LAB
ALBUMIN SERPL-MCNC: 4.3 G/DL (ref 3.5–5.2)
ALBUMIN UR-MCNC: <1.2 MG/DL
ALBUMIN/GLOB SERPL: 1.3 G/DL
ALP SERPL-CCNC: 85 U/L (ref 39–117)
ALT SERPL W P-5'-P-CCNC: 12 U/L (ref 1–33)
ANION GAP SERPL CALCULATED.3IONS-SCNC: 9.8 MMOL/L (ref 5–15)
AST SERPL-CCNC: 18 U/L (ref 1–32)
BILIRUB BLD-MCNC: NEGATIVE MG/DL
BILIRUB SERPL-MCNC: 0.4 MG/DL (ref 0–1.2)
BUN SERPL-MCNC: 20 MG/DL (ref 8–23)
BUN/CREAT SERPL: 18.9 (ref 7–25)
CALCIUM SPEC-SCNC: 9.5 MG/DL (ref 8.6–10.5)
CHLORIDE SERPL-SCNC: 100 MMOL/L (ref 98–107)
CLARITY, POC: ABNORMAL
CO2 SERPL-SCNC: 28.2 MMOL/L (ref 22–29)
COLOR UR: ABNORMAL
CREAT SERPL-MCNC: 1.06 MG/DL (ref 0.57–1)
CREAT UR-MCNC: 130 MG/DL
EGFRCR SERPLBLD CKD-EPI 2021: 51.9 ML/MIN/1.73
GLOBULIN UR ELPH-MCNC: 3.3 GM/DL
GLUCOSE SERPL-MCNC: 143 MG/DL (ref 65–99)
GLUCOSE UR STRIP-MCNC: NEGATIVE MG/DL
HBA1C MFR BLD: 8.3 % (ref 4.8–5.6)
KETONES UR QL: NEGATIVE
LEUKOCYTE EST, POC: ABNORMAL
MICROALBUMIN/CREAT UR: NORMAL MG/G{CREAT}
NITRITE UR-MCNC: POSITIVE MG/ML
PH UR: 6 [PH] (ref 5–8)
POTASSIUM SERPL-SCNC: 4.1 MMOL/L (ref 3.5–5.2)
PROT SERPL-MCNC: 7.6 G/DL (ref 6–8.5)
PROT UR STRIP-MCNC: NEGATIVE MG/DL
RBC # UR STRIP: NEGATIVE /UL
SODIUM SERPL-SCNC: 138 MMOL/L (ref 136–145)
SP GR UR: 1.02 (ref 1–1.03)
UROBILINOGEN UR QL: ABNORMAL

## 2025-01-24 PROCEDURE — 87186 SC STD MICRODIL/AGAR DIL: CPT | Performed by: FAMILY MEDICINE

## 2025-01-24 PROCEDURE — 36415 COLL VENOUS BLD VENIPUNCTURE: CPT | Performed by: FAMILY MEDICINE

## 2025-01-24 PROCEDURE — 80053 COMPREHEN METABOLIC PANEL: CPT | Performed by: FAMILY MEDICINE

## 2025-01-24 PROCEDURE — 1125F AMNT PAIN NOTED PAIN PRSNT: CPT | Performed by: FAMILY MEDICINE

## 2025-01-24 PROCEDURE — 82570 ASSAY OF URINE CREATININE: CPT | Performed by: FAMILY MEDICINE

## 2025-01-24 PROCEDURE — 87086 URINE CULTURE/COLONY COUNT: CPT | Performed by: FAMILY MEDICINE

## 2025-01-24 PROCEDURE — 87077 CULTURE AEROBIC IDENTIFY: CPT | Performed by: FAMILY MEDICINE

## 2025-01-24 PROCEDURE — 3080F DIAST BP >= 90 MM HG: CPT | Performed by: FAMILY MEDICINE

## 2025-01-24 PROCEDURE — 3075F SYST BP GE 130 - 139MM HG: CPT | Performed by: FAMILY MEDICINE

## 2025-01-24 PROCEDURE — 99214 OFFICE O/P EST MOD 30 MIN: CPT | Performed by: FAMILY MEDICINE

## 2025-01-24 PROCEDURE — 82043 UR ALBUMIN QUANTITATIVE: CPT | Performed by: FAMILY MEDICINE

## 2025-01-24 PROCEDURE — 81002 URINALYSIS NONAUTO W/O SCOPE: CPT | Performed by: FAMILY MEDICINE

## 2025-01-24 PROCEDURE — 83036 HEMOGLOBIN GLYCOSYLATED A1C: CPT | Performed by: FAMILY MEDICINE
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
## 2025-01-26 LAB — BACTERIA SPEC AEROBE CULT: ABNORMAL

## 2025-01-30 ENCOUNTER — TELEPHONE (OUTPATIENT)
Dept: FAMILY MEDICINE CLINIC | Facility: CLINIC | Age: 85
End: 2025-01-30

## 2025-01-30 NOTE — TELEPHONE ENCOUNTER
Caller: Sabi Haynes    Relationship to patient: Self    Best call back number: [phone]     Patient is needing: PATIENT WOULD LIKE A CALL BACK TO DISCUSS RESULTS FROM LABS DONE LAST WEEK. SHE HAS NOT HEARD ANYTHING.        CONTACT PATIENT TO ADVISE.

## 2025-02-03 DIAGNOSIS — R31.9 URINARY TRACT INFECTION WITH HEMATURIA, SITE UNSPECIFIED: Primary | ICD-10-CM

## 2025-02-03 DIAGNOSIS — N39.0 URINARY TRACT INFECTION WITH HEMATURIA, SITE UNSPECIFIED: Primary | ICD-10-CM

## 2025-02-03 RX ORDER — CEPHALEXIN 500 MG/1
500 CAPSULE ORAL 3 TIMES DAILY
Qty: 21 CAPSULE | Refills: 0 | Status: SHIPPED | OUTPATIENT
Start: 2025-02-03 | End: 2025-02-10

## 2025-02-04 ENCOUNTER — PATIENT MESSAGE (OUTPATIENT)
Dept: FAMILY MEDICINE CLINIC | Facility: CLINIC | Age: 85
End: 2025-02-04
Payer: MEDICARE

## 2025-02-04 NOTE — TELEPHONE ENCOUNTER
Unsuccessful contact.  Per Dr. Kwasi avery sent to [facility] pharmacy for patient.  Detailed message left on phone and [employer] message sent as well.

## 2025-02-06 ENCOUNTER — TELEPHONE (OUTPATIENT)
Dept: FAMILY MEDICINE CLINIC | Facility: CLINIC | Age: 85
End: 2025-02-06
Payer: MEDICARE

## 2025-02-06 NOTE — TELEPHONE ENCOUNTER
Patient called the office stating that she is very sick and no one has contacted her regarding her urine test results.  I advised the patient that I myself called and left her a detailed message on 2/3/25 as well as a mychart message advising her that her culture came back and that Dr. Villalpando sent in Keflex to [facility] for her.  Patient states that she did not get a call from [facility] advising her that the medication was ready.  I advised her that I am not sure about that but the medication was sent and our records show confirmation of receipt by [facility].  Patient stated that her grand daughter checks mychart every day and she said there is no message from us.  I advised her that I did send it on 2/3/25 and read the  message I sent.  Patient stated she was going to [facility] and the prescription better be there.  Advised her once again it was sent and we have confirmation of receipt but if she needs anything to please let me know.  She verbally stated understanding.

## 2025-04-08 ENCOUNTER — READMISSION MANAGEMENT (OUTPATIENT)
Dept: CALL CENTER | Facility: HOSPITAL | Age: 85
End: 2025-04-08
Payer: MEDICARE

## 2025-04-09 ENCOUNTER — TRANSITIONAL CARE MANAGEMENT TELEPHONE ENCOUNTER (OUTPATIENT)
Dept: CALL CENTER | Facility: HOSPITAL | Age: 85
End: 2025-04-09
Payer: MEDICARE

## 2025-04-09 NOTE — OUTREACH NOTE
Call Center TCM Note      Flowsheet Row Responses   [facility] patient discharged from? Non-   Does the patient have one of the following disease processes/diagnoses(primary or secondary)? Other   TCM attempt successful? Yes  [updated VR is blank]   Call start time 1459   Call end time 1507   Discharge diagnosis Urinary tract infection   Meds reviewed with patient/caregiver? Yes   Prescription comments home on antibiotic pt reports.   Comments 4/17/2025  3:45 PM  HOSPITAL FOLLOW UP   Does the patient have an appointment with their PCP within 7-14 days of discharge? Yes   Comments Pt reports decreased appetite, abdominal cramping after breakfast this morning. Pt reports some constipation, has had no BM x2 days, which pt reports is odd for her as she typically suffers from chronic diarrhea. The pt self catheterizes at home, she reports. Pt denies fever/chills, hematuria or other issues at this time.   What is the patient's perception of their health status since discharge? Improving   Is the patient/caregiver able to teach back signs and symptoms related to disease process for when to call PCP? Yes   Is the patient/caregiver able to teach back signs and symptoms related to disease process for when to call 911? Yes   Is the patient/caregiver able to teach back the hierarchy of who to call/visit for symptoms/problems? PCP, Specialist, Home health nurse, Urgent Care, ED, 911 Yes   TCM call completed? Yes   Call end time 1507            Michelle RUIZ - Registered Nurse    4/9/2025, 15:08 EDT

## 2025-04-09 NOTE — OUTREACH NOTE
Prep Survey      Flowsheet Row Responses   [facility] facility patient discharged from? Non-BH   Is LACE score < 7 ? Non-BH Discharge   Eligibility [facility]   Date of Admission 04/06/25   Date of Discharge 04/08/25   Discharge Disposition Home or Self Care   Discharge diagnosis Urinary tract infection   Does the patient have one of the following disease processes/diagnoses(primary or secondary)? Other   Does the patient have Home health ordered? No   Prep survey completed? Yes            JODY ALONZO - Registered Nurse

## 2025-04-09 NOTE — OUTREACH NOTE
Call Center TCM Note      Flowsheet Row Responses   [facility] facility patient discharged from? Non-BH   Does the patient have one of the following disease processes/diagnoses(primary or secondary)? Other   TCM attempt successful? No  [VR updated is blank]   Unsuccessful attempts Attempt 1   Call Status Left message            Michelle Ott Registered Nurse    4/9/2025, 12:44 EDT

## 2025-04-10 ENCOUNTER — TELEPHONE (OUTPATIENT)
Dept: FAMILY MEDICINE CLINIC | Facility: CLINIC | Age: 85
End: 2025-04-10
Payer: MEDICARE

## 2025-04-10 NOTE — TELEPHONE ENCOUNTER
Pt calling with questions regarding medication she was put on while in the hospital, specifically a medication that was a patch and one that was a shot, unsure of the name, please advise thank you

## 2025-04-11 NOTE — TELEPHONE ENCOUNTER
Unsuccessful contact.  Left detailed message providing the office phone number for the patient to reach out to us on Monday since it is currently 7:06pm on Friday night.

## 2025-04-17 ENCOUNTER — OFFICE VISIT (OUTPATIENT)
Dept: FAMILY MEDICINE CLINIC | Facility: CLINIC | Age: 85
End: 2025-04-17
Payer: MEDICARE

## 2025-04-17 VITALS
SYSTOLIC BLOOD PRESSURE: 132 MMHG | HEART RATE: 86 BPM | BODY MASS INDEX: 27.64 KG/M2 | WEIGHT: 172 LBS | OXYGEN SATURATION: 96 % | TEMPERATURE: 98.2 F | HEIGHT: 66 IN | DIASTOLIC BLOOD PRESSURE: 74 MMHG

## 2025-04-17 DIAGNOSIS — R31.9 URINARY TRACT INFECTION WITH HEMATURIA, SITE UNSPECIFIED: ICD-10-CM

## 2025-04-17 DIAGNOSIS — I10 PRIMARY HYPERTENSION: Primary | ICD-10-CM

## 2025-04-17 DIAGNOSIS — H54.7 VISION LOSS: ICD-10-CM

## 2025-04-17 DIAGNOSIS — E11.65 TYPE 2 DIABETES MELLITUS WITH HYPERGLYCEMIA, WITH LONG-TERM CURRENT USE OF INSULIN: ICD-10-CM

## 2025-04-17 DIAGNOSIS — Z79.4 TYPE 2 DIABETES MELLITUS WITH HYPERGLYCEMIA, WITH LONG-TERM CURRENT USE OF INSULIN: ICD-10-CM

## 2025-04-17 DIAGNOSIS — N39.0 URINARY TRACT INFECTION WITH HEMATURIA, SITE UNSPECIFIED: ICD-10-CM

## 2025-04-17 RX ORDER — BACLOFEN 5 MG/1
5 TABLET ORAL
COMMUNITY
Start: 2025-04-08

## 2025-04-17 RX ORDER — LEVOTHYROXINE SODIUM 125 MCG
TABLET ORAL
COMMUNITY
Start: 2025-02-06

## 2025-04-17 RX ORDER — PROCHLORPERAZINE 25 MG/1
SUPPOSITORY RECTAL
COMMUNITY
Start: 2025-04-10

## 2025-04-17 RX ORDER — INSULIN GLARGINE 100 [IU]/ML
30 INJECTION, SOLUTION SUBCUTANEOUS EVERY EVENING
Start: 2025-04-17 | End: 2025-07-16

## 2025-04-17 RX ORDER — CHLORHEXIDINE GLUCONATE ORAL RINSE 1.2 MG/ML
15 SOLUTION DENTAL
COMMUNITY
Start: 2025-04-08

## 2025-04-17 NOTE — PROGRESS NOTES
Chief Complaint  [facility] UTI    Subjective        Sabi Haynes presents to [facility]  History of Present Illness  The patient presents for evaluation of recurrent urinary tract infections, diabetes mellitus, and vision loss.    She continues to experience fatigue and persistent urinary tract infections. She practices self-catheterization, ensuring cleanliness with soap and water prior to each procedure. Concerns are expressed about the sterility of the catheters, which are individually wrapped in plastic but handled by others during packaging. Despite diligent hand hygiene, a 3-day episode of hematuria occurred 2 months ago, which did not prompt medical attention. Her daughter suggested the possibility of a ruptured cyst. A sensation of a foreign body in the urinary tract is reported, which she is unable to identify as a sore or other abnormality. Pain is experienced during catheter insertion, and lubricating gel has been provided, which she initially found unnecessary but has recently found beneficial. Information was provided that her uterus is thin and requires constant lubrication. An incident is recalled where hospitalization was advised due to the risk of sepsis from a urinary tract infection, but she declined. Dysuria was not experienced during the last infection, but sharp pain and backache were present, attributed to physical strain. Keflex has been prescribed for the current infection. A history of urinary tract infections dating back to when she was 52 years old is noted, at which time weekly injections were received from a physician in [place].    Diabetes has been diagnosed, and a Dexcom device is used for blood glucose monitoring. She is also on Trulicity and insulin therapy. Hypoglycemic episodes have occurred since the last visit, severe enough to prevent checking blood glucose levels. Insulin injections were administered during the hospital stay, starting  at 50 units and later reduced to 25 units. Insulin has been used for several years, and injection sites are alternated. Headaches and elevated blood pressure are reported, attributed to diabetes. A healthy diet has been maintained since starting Trulicity.    Worsening vision is reported, and no eye examination has occurred in approximately a year. Ophthalmology appointments were discontinued due to illness and hospitalization. Injections costing $2000 were received, believed to be affecting the body. She lives alone and is considering hiring help as vision continues to deteriorate. Driving cessation at night is noted, and difficulty locating buttons on a black stove and microwave is reported. She does not want to quit driving yet.    A history of diarrhea since the placement of a TENS unit is noted, believed to be incorrectly positioned. Consultations with 2 urologists were unsatisfactory. Diarrhea has been experienced for the past 3 to 4 years, with no recollection of the last solid bowel movement. Recent constipation resolved after taking 4 gummy pills, resulting in a bowel movement at 4:00 AM, which was loose. Stools have been black, attributed to antibiotic use. Advice to use a bag for urinary drainage has been given, but reluctance to do so is expressed.        Medical History: has a past medical history of Acid reflux, Allergic (2002), Arthritis, Bladder disorder, Bladder paralysis, Blind spot scotoma, left, Condition not found, Coronary artery disease (1975), DDD (degenerative disc disease), cervical, DDD (degenerative disc disease), lumbar, Diabetes mellitus, Gastric ulcer, Hearing loss, Heart attack, Heart disease, Hemorrhoids, History of confusion, History of migraine headaches, Hyperlipemia, Hyperlipidemia, Hypothyroidism, Incontinence of bowel, Incontinence of urine, Kidney disease, Left hand pain, Limb swelling, Macular degeneration, PONV (postoperative nausea and vomiting), Primary osteoarthritis of  "right knee (08/24/2018), Recent total retinal detachment, Renal calculi, Renal insufficiency (1965), Right BBB/left ant fasc block, Ringing in ears, Rotator cuff tear (06/17/2021), S/P arthroscopic partial medial meniscectomy (12/08/2017), S/P medial meniscectomy of right knee (10/11/2017), Seasonal allergies, Self-catheterizes urinary bladder, Short-term memory loss, Shortness of breath, Spinal stenosis, Stroke, Thyroid disorder, and Vitamin D deficiency.   Surgical History: has a past surgical history that includes Varicose vein surgery; Cholecystectomy; Hysterectomy; Laparoscopic tubal ligation; Tear duct surgery; Extracorporeal shock wave lithotripsy (Left, 9/13/2019); Other surgical history; Bladder surgery (2002); Carpal tunnel release; Colonoscopy; Cyst Removal; Gallbladder surgery; Total hip arthroplasty (2010); Hysterectomy (1969); Knee surgery (08/22); Eye surgery; Joint replacement; and Shoulder arthroscopy w/ rotator cuff repair (Right, 6/17/2021).   Family History: family history includes Arthritis in her father and mother; Cancer in her father; Diabetes in her mother; Heart disease in her mother; Leukemia in her father; Osteoporosis in her brother; Stroke in her son.   Social History: reports that she has never smoked. She has never been exposed to tobacco smoke. She has never used smokeless tobacco. She reports that she does not drink alcohol and does not use drugs.  Immunization History   Administered Date(s) Administered    COVID-19 (PFIZER) Purple Cap Monovalent 05/14/2021, 06/04/2021    Covid-19 (Pfizer) Gray Cap Monovalent 01/19/2022    PPD Test 06/22/2021    Tdap 04/11/2017, 08/16/2022       Objective   Vital Signs:  /74   Pulse 86   Temp 98.2 °F (36.8 °C)   Ht 167.6 cm (66\")   Wt 78 kg (172 lb)   SpO2 96%   BMI 27.76 kg/m²   Estimated body mass index is 27.76 kg/m² as calculated from the following:    Height as of this encounter: 167.6 cm (66\").    Weight as of this encounter: 78 kg " (172 lb).     BMI is >= 25 and <30. (Overweight) The following options were offered after discussion;: nutrition counseling/recommendations      ROS:  Review of Systems   Constitutional:  Negative for fatigue and fever.   HENT:  Negative for congestion, ear pain and sinus pressure.    Respiratory:  Negative for cough, chest tightness and shortness of breath.    Cardiovascular:  Negative for chest pain, palpitations and leg swelling.   Gastrointestinal:  Negative for abdominal pain and diarrhea.   Genitourinary:  Negative for dysuria and frequency.   Neurological:  Negative for speech difficulty, headache and confusion.   Psychiatric/Behavioral:  Negative for agitation and behavioral problems.       Physical Exam  Vitals reviewed.   Constitutional:       Appearance: Normal appearance.   HENT:      Right Ear: Tympanic membrane normal.      Left Ear: Tympanic membrane normal.      Nose: Nose normal.   Eyes:      Extraocular Movements: Extraocular movements intact.      Conjunctiva/sclera: Conjunctivae normal.      Pupils: Pupils are equal, round, and reactive to light.   Cardiovascular:      Rate and Rhythm: Normal rate and regular rhythm.   Pulmonary:      Effort: Pulmonary effort is normal.      Breath sounds: Normal breath sounds.   Abdominal:      General: Bowel sounds are normal.   Musculoskeletal:         General: Normal range of motion.      Cervical back: Normal range of motion.   Skin:     General: Skin is warm and dry.   Neurological:      General: No focal deficit present.      Mental Status: She is alert and oriented to person, place, and time.   Psychiatric:         Mood and Affect: Mood normal.         Behavior: Behavior normal.       Physical Exam  Genitourinary: Normal external appearance, no masses, lesions, or discharge      Result Review     The following data was reviewed by: Drea Villalpando MD on 04/17/2025:  Common labs          8/1/2024    16:06 10/24/2024    13:05 1/24/2025    17:33   Common  Labs   Glucose 113   143    BUN 17   20    Creatinine 1.08   1.06    Sodium 140   138    Potassium 4.6   4.1    Chloride 107   100    Calcium 9.4   9.5    Albumin 4.3   4.3    Total Bilirubin 0.3   0.4    Alkaline Phosphatase 66   85    AST (SGOT) 18   18    ALT (SGPT) 9   12    Total Cholesterol 198      Triglycerides 184      HDL Cholesterol 35      LDL Cholesterol  130      Hemoglobin A1C 7.70   8.30    Microalbumin, Urine  <1.2  <1.2      Results  Labs   - Urinalysis: Presence of Klebsiella bacteria    Imaging   - CT scan: Solid stool in the rectum             Assessment and Plan    Diagnoses and all orders for this visit:    1. Primary hypertension (Primary)  -     Ambulatory Referral to Chronic Care Management Care Coordination, Medication Adherence, Disease Education, Caregiving/Support, High Risk for ED/Readmission, Preventative Care    2. Type 2 diabetes mellitus with hyperglycemia, with long-term current use of insulin  -     Insulin Glargine (Lantus SoloStar) 100 UNIT/ML injection pen; Inject 30 Units under the skin into the appropriate area as directed Every Evening for 90 days.  -     Ambulatory Referral to Chronic Care Management Care Coordination, Medication Adherence, Disease Education, Caregiving/Support, High Risk for ED/Readmission, Preventative Care    3. Urinary tract infection with hematuria, site unspecified  -     Ambulatory Referral to Chronic Care Management Care Coordination, Medication Adherence, Disease Education, Caregiving/Support, High Risk for ED/Readmission, Preventative Care    4. Vision loss  -     Ambulatory Referral to Chronic Care Management Care Coordination, Medication Adherence, Disease Education, Caregiving/Support, High Risk for ED/Readmission, Preventative Care      Assessment & Plan  1. Recurrent urinary tract infections.  - Recurrent urinary tract infections are likely due to neurogenic bladder and cystitis, predisposing her to bacterial infections.  - Presence of  Klebsiella bacteria in her urine culture necessitates a different prophylactic antibiotic, as the bacteria have developed resistance to commonly used antibiotics such as nitrofurantoin.  - Advised to use lubricating gel during catheter insertion to prevent sores and bleeding. Kidney function remains within normal limits.  - Consultation with an infectious disease specialist will be arranged to determine the most effective prophylactic antibiotic. Advised to maintain adequate hydration and avoid excessive consumption of carbonated sodas and black coffee. Encouraged to continue insulin injections and remain active.    2. Diabetes mellitus.  - Educated on the use of the Dexcom device for continuous blood glucose monitoring.  - Advised to continue insulin injections and maintain a balanced diet. Discussed the potential for high blood sugar levels to increase the risk of urinary tract infections.  - Insulin dosage adjusted to 30 units for the next 2 weeks, after which blood glucose levels will be reassessed.  - Encouraged to stay hydrated and avoid excessive consumption of carbonated sodas and black coffee.    3. Vision loss.  - Encouraged to learn Braille and consider using a caregiver program for assistance with daily activities.  - Advised to avoid driving at night.  - Discussed the importance of early adaptation to vision loss to maintain independence and safety.    Follow-up  The patient will follow up in 2 weeks.       I spent 35 minutes caring for Sabi on this date of service. This time includes time spent by me in the following activities:reviewing tests  Follow Up   Return in about 2 weeks (around 5/1/2025).  Patient was given instructions and counseling regarding her condition or for health maintenance advice. Please see specific information pulled into the AVS if appropriate.   Patient or patient representative verbalized consent for the use of Ambient Listening during the visit with  Drea Villalpando MD  for chart documentation. 4/17/2025  17:38 EDT    Drea Villalpando MD

## 2025-04-18 ENCOUNTER — REFERRAL TRIAGE (OUTPATIENT)
Dept: CASE MANAGEMENT | Facility: OTHER | Age: 85
End: 2025-04-18
Payer: MEDICARE

## 2025-04-21 ENCOUNTER — TELEPHONE (OUTPATIENT)
Dept: CASE MANAGEMENT | Facility: OTHER | Age: 85
End: 2025-04-21
Payer: MEDICARE

## 2025-04-21 NOTE — TELEPHONE ENCOUNTER
UTR #1. Left VM with RN [place] office number to call back and discuss CCM program    Will call back tomorrow     Essence LAGOS RN  Ambulatory

## 2025-04-22 ENCOUNTER — TELEPHONE (OUTPATIENT)
Dept: CASE MANAGEMENT | Facility: OTHER | Age: 85
End: 2025-04-22
Payer: MEDICARE

## 2025-04-22 NOTE — TELEPHONE ENCOUNTER
UTR #2. Unable to leave message. No answer.     Will try again Thursday.     Essence LAGOS RN  Ambulatory    Principal Discharge DX:	[place] infant of 40 completed weeks of gestation  Goal:	Well infant  Assessment and plan of treatment:	Routine  care.  Follow up with PMD in 1-3 days.  Secondary Diagnosis:	SGA (small for gestational age)  Goal:	Normoglycemia  Assessment and plan of treatment:	Glucose levels were monitored as per hypoglycemia protocol.  Secondary Diagnosis:	Infant of diabetic mother  Goal:	Normoglycemia  Assessment and plan of treatment:	Glucose levels were monitored as per hypoglycemia protocol.

## 2025-04-24 ENCOUNTER — TELEPHONE (OUTPATIENT)
Dept: CASE MANAGEMENT | Facility: OTHER | Age: 85
End: 2025-04-24
Payer: MEDICARE

## 2025-04-24 ENCOUNTER — PATIENT OUTREACH (OUTPATIENT)
Dept: CASE MANAGEMENT | Facility: OTHER | Age: 85
End: 2025-04-24
Payer: MEDICARE

## 2025-04-24 NOTE — OUTREACH NOTE
AMBULATORY CASE MANAGEMENT NOTE    Names and Relationships of Patient/Support Persons: Contact: Sabi Haynes; Relationship: Self -     Patient Outreach  Patient called back and discussed CCM program. She stated she is not interested in anything that  won't pay for. She is not willing to do programs at this time. She stated she doesn't feel she needs any assistance.     Case remains closed at this time.     Education Documentation  No documentation found.        Essence ALANIS  Ambulatory Case Management    4/24/2025, 12:13 EDT

## 2025-04-24 NOTE — TELEPHONE ENCOUNTER
UTR #3. No answer, unable to leave message.    Case closed at this time. Will reopen if patient calls back or needs future assistance.     Essence LAGOS RN  Ambulatory

## 2025-05-01 ENCOUNTER — OFFICE VISIT (OUTPATIENT)
Dept: FAMILY MEDICINE CLINIC | Facility: CLINIC | Age: 85
End: 2025-05-01
Payer: MEDICARE

## 2025-05-01 VITALS
WEIGHT: 173.3 LBS | HEIGHT: 66 IN | DIASTOLIC BLOOD PRESSURE: 74 MMHG | BODY MASS INDEX: 27.85 KG/M2 | SYSTOLIC BLOOD PRESSURE: 140 MMHG | TEMPERATURE: 98 F | HEART RATE: 73 BPM | OXYGEN SATURATION: 98 %

## 2025-05-01 DIAGNOSIS — K21.00 GASTROESOPHAGEAL REFLUX DISEASE WITH ESOPHAGITIS WITHOUT HEMORRHAGE: ICD-10-CM

## 2025-05-01 DIAGNOSIS — Z79.4 TYPE 2 DIABETES MELLITUS WITH HYPERGLYCEMIA, WITH LONG-TERM CURRENT USE OF INSULIN: Primary | ICD-10-CM

## 2025-05-01 DIAGNOSIS — E11.65 TYPE 2 DIABETES MELLITUS WITH HYPERGLYCEMIA, WITH LONG-TERM CURRENT USE OF INSULIN: Primary | ICD-10-CM

## 2025-05-01 RX ORDER — PROCHLORPERAZINE 25 MG/1
1 SUPPOSITORY RECTAL CONTINUOUS
Qty: 3 EACH | Refills: 5 | Status: SHIPPED | OUTPATIENT
Start: 2025-05-01 | End: 2025-05-31

## 2025-05-01 RX ORDER — FAMOTIDINE 40 MG/1
40 TABLET, FILM COATED ORAL DAILY
Qty: 90 TABLET | Refills: 3 | Status: SHIPPED | OUTPATIENT
Start: 2025-05-01 | End: 2025-07-30

## 2025-05-01 RX ORDER — ISOPROPYL ALCOHOL 70 ML/100ML
1 SWAB TOPICAL 3 TIMES DAILY
Qty: 100 EACH | Refills: 5 | Status: SHIPPED | OUTPATIENT
Start: 2025-05-01

## 2025-05-01 RX ORDER — LEVOTHYROXINE SODIUM 112 UG/1
112 TABLET ORAL DAILY
Qty: 90 TABLET | Refills: 3 | Status: SHIPPED | OUTPATIENT
Start: 2025-05-01 | End: 2025-07-30

## 2025-05-01 RX ORDER — MULTIVITAMIN WITH IRON
500 TABLET ORAL DAILY
Qty: 90 TABLET | Refills: 1 | Status: SHIPPED | OUTPATIENT
Start: 2025-05-01 | End: 2025-07-30

## 2025-05-01 NOTE — PROGRESS NOTES
Chief Complaint  Follow-up    Subjective        Saib Haynes presents to [facility]  History of Present Illness  The patient presents for evaluation of diabetes management, vision loss, and heartburn.    The patient has been using a Dexcom device to monitor blood glucose levels. She reports that the device has been effective, but there is confusion regarding the duration of use and the necessity of insulin administration when her blood glucose levels are between 112 to 123. An incident occurred where the device was prematurely removed before full insertion, resulting in the loss of one device this week. She is currently on a regimen of 30 units of insulin daily, with occasional increases to 50 units.    Intermittent visual disturbances are reported, with episodes where vision becomes completely black. Difficulty in reading mail due to visual impairment is also mentioned. Despite these challenges, she continues to drive during daylight hours.    Heartburn is a significant issue, with medication taken this morning. She often feels gassy and finds relief from burping. Two episodes of near vomiting have been noted. She has been informed of having stomach problems due to frequent burping.        Medical History: has a past medical history of Acid reflux, Allergic (2002), Arthritis, Bladder disorder, Bladder paralysis, Blind spot scotoma, left, Condition not found, Coronary artery disease (1975), DDD (degenerative disc disease), cervical, DDD (degenerative disc disease), lumbar, Diabetes mellitus, Gastric ulcer, Hearing loss, Heart attack, Heart disease, Hemorrhoids, History of confusion, History of migraine headaches, Hyperlipemia, Hyperlipidemia, Hypothyroidism, Incontinence of bowel, Incontinence of urine, Kidney disease, Left hand pain, Limb swelling, Macular degeneration, PONV (postoperative nausea and vomiting), Primary osteoarthritis of right knee (08/24/2018), Recent total  "retinal detachment, Renal calculi, Renal insufficiency (1965), Right BBB/left ant fasc block, Ringing in ears, Rotator cuff tear (06/17/2021), S/P arthroscopic partial medial meniscectomy (12/08/2017), S/P medial meniscectomy of right knee (10/11/2017), Seasonal allergies, Self-catheterizes urinary bladder, Short-term memory loss, Shortness of breath, Spinal stenosis, Stroke, Thyroid disorder, and Vitamin D deficiency.   Surgical History: has a past surgical history that includes Varicose vein surgery; Cholecystectomy; Hysterectomy; Laparoscopic tubal ligation; Tear duct surgery; Extracorporeal shock wave lithotripsy (Left, 9/13/2019); Other surgical history; Bladder surgery (2002); Carpal tunnel release; Colonoscopy; Cyst Removal; Gallbladder surgery; Total hip arthroplasty (2010); Hysterectomy (1969); Knee surgery (08/22); Eye surgery; Joint replacement; and Shoulder arthroscopy w/ rotator cuff repair (Right, 6/17/2021).   Family History: family history includes Arthritis in her father and mother; Cancer in her father; Diabetes in her mother; Heart disease in her mother; Leukemia in her father; Osteoporosis in her brother; Stroke in her son.   Social History: reports that she has never smoked. She has never been exposed to tobacco smoke. She has never used smokeless tobacco. She reports that she does not drink alcohol and does not use drugs.  Immunization History   Administered Date(s) Administered    COVID-19 (PFIZER) Purple Cap Monovalent 05/14/2021, 06/04/2021    Covid-19 (Pfizer) Gray Cap Monovalent 01/19/2022    PPD Test 06/22/2021    Tdap 04/11/2017, 08/16/2022       Objective   Vital Signs:  /74   Pulse 73   Temp 98 °F (36.7 °C) (Oral)   Ht 167.6 cm (66\")   Wt 78.6 kg (173 lb 4.8 oz)   SpO2 98%   BMI 27.97 kg/m²   Estimated body mass index is 27.97 kg/m² as calculated from the following:    Height as of this encounter: 167.6 cm (66\").    Weight as of this encounter: 78.6 kg (173 lb 4.8 oz).      "        ROS:  Review of Systems   Constitutional:  Negative for fatigue and fever.   HENT:  Negative for congestion, ear pain and sinus pressure.    Respiratory:  Negative for cough, chest tightness and shortness of breath.    Cardiovascular:  Negative for chest pain, palpitations and leg swelling.   Gastrointestinal:  Negative for abdominal pain and diarrhea.   Genitourinary:  Negative for dysuria and frequency.   Neurological:  Negative for speech difficulty, headache and confusion.   Psychiatric/Behavioral:  Negative for agitation and behavioral problems.       Physical Exam  Vitals reviewed.   Constitutional:       Appearance: Normal appearance.   HENT:      Right Ear: Tympanic membrane normal.      Left Ear: Tympanic membrane normal.      Nose: Nose normal.   Eyes:      Extraocular Movements: Extraocular movements intact.      Conjunctiva/sclera: Conjunctivae normal.      Pupils: Pupils are equal, round, and reactive to light.   Cardiovascular:      Rate and Rhythm: Normal rate and regular rhythm.   Pulmonary:      Effort: Pulmonary effort is normal.      Breath sounds: Normal breath sounds.   Abdominal:      General: Bowel sounds are normal.   Musculoskeletal:         General: Normal range of motion.      Cervical back: Normal range of motion.   Skin:     General: Skin is warm and dry.   Neurological:      General: No focal deficit present.      Mental Status: She is alert and oriented to person, place, and time.   Psychiatric:         Mood and Affect: Mood normal.         Behavior: Behavior normal.       Physical Exam  Respiratory: Clear to auscultation, no wheezing, rales or rhonchi  Cardiovascular: Regular rate and rhythm, no murmurs, rubs, or gallops      Result Review     The following data was reviewed by: Drea Villalpando MD on 05/01/2025:  Common labs          8/1/2024    16:06 10/24/2024    13:05 1/24/2025    17:33   Common Labs   Glucose 113   143    BUN 17   20    Creatinine 1.08   1.06    Sodium  140   138    Potassium 4.6   4.1    Chloride 107   100    Calcium 9.4   9.5    Albumin 4.3   4.3    Total Bilirubin 0.3   0.4    Alkaline Phosphatase 66   85    AST (SGOT) 18   18    ALT (SGPT) 9   12    Total Cholesterol 198      Triglycerides 184      HDL Cholesterol 35      LDL Cholesterol  130      Hemoglobin A1C 7.70   8.30    Microalbumin, Urine  <1.2  <1.2      Results  Labs   - Blood glucose monitoring: Blood sugar was 123 on the first night of monitoring.   - Blood glucose monitoring: Blood sugar reached 220 at one point.   - Blood glucose monitoring: Blood sugar dropped to 70 on the second night of monitoring.             Assessment and Plan     Diagnoses and all orders for this visit:    1. Type 2 diabetes mellitus with hyperglycemia, with long-term current use of insulin (Primary)  -     Continuous Glucose Transmitter (Dexcom G6 Transmitter) misc; Place 1 Device on the skin as directed by provider Continuous for 30 days.  Dispense: 3 each; Refill: 5  -     Alcohol Swabs (Easy Touch Alcohol Prep Medium) 70 % pads; 1 application  by Epidural route 3 (Three) Times a Day.  Dispense: 100 each; Refill: 5    2. Gastroesophageal reflux disease with esophagitis without hemorrhage  -     famotidine (PEPCID) 40 MG tablet; Take 1 tablet by mouth Daily for 90 days.  Dispense: 90 tablet; Refill: 3    Other orders  -     levothyroxine (Synthroid) 112 MCG tablet; Take 1 tablet by mouth Daily for 90 days.  Dispense: 90 tablet; Refill: 3  -     vitamin B-12 (CYANOCOBALAMIN) 500 MCG tablet; Take 1 tablet by mouth Daily for 90 days.  Dispense: 90 tablet; Refill: 1      Assessment & Plan  1. Diabetes Mellitus.  - Advised to continue daily regimen of long-acting insulin, specifically 30 units once a day, regardless of blood glucose levels.  - Prescription for a Dexcom transmitter issued, with instructions to provide 3 devices this time.  - Advised to verify blood glucose levels using a fingerstick method if there are doubts  about the accuracy of the Dexcom readings.  - Discussed the importance of maintaining blood glucose levels under 200 at all times.    2. Vision Loss.  - Encouraged to explore resources available through the  for individuals experiencing vision loss.  - Referral to Essence, the chronic care manager, made to assist in accessing these resources.  - Appointment with Essence to be scheduled at the earliest convenience.  - Discussed the potential benefits of devices that can assist with vision impairment, such as a talking Dexcom .    3. Heartburn.  - Advised to take heartburn medication daily and to avoid lying completely flat during sleep, instead opting for a slightly elevated position using pillows.  - Prescription for omeprazole or pantoprazole to be sent to the pharmacy.  - Discussed the relationship between heartburn and the sensation of warmth in the chest, emphasizing the importance of consistent medication use.  - Recommended lifestyle modifications to manage symptoms, including dietary changes and positional adjustments during sleep.       I spent 35 minutes caring for Sabi on this date of service. This time includes time spent by me in the following activities:reviewing tests  Follow Up   Return in about 4 weeks (around 5/29/2025).  Patient was given instructions and counseling regarding her condition or for health maintenance advice. Please see specific information pulled into the AVS if appropriate.   Patient or patient representative verbalized consent for the use of Ambient Listening during the visit with  Drea Villalpando MD for chart documentation. 5/1/2025  17:53 EDT    Drea Villalpando MD

## 2025-05-27 ENCOUNTER — OFFICE VISIT (OUTPATIENT)
Dept: FAMILY MEDICINE CLINIC | Facility: CLINIC | Age: 85
End: 2025-05-27
Payer: MEDICARE

## 2025-05-27 VITALS
WEIGHT: 170.6 LBS | TEMPERATURE: 98.1 F | BODY MASS INDEX: 27.42 KG/M2 | DIASTOLIC BLOOD PRESSURE: 66 MMHG | HEART RATE: 91 BPM | OXYGEN SATURATION: 98 % | HEIGHT: 66 IN | SYSTOLIC BLOOD PRESSURE: 154 MMHG

## 2025-05-27 DIAGNOSIS — E11.65 TYPE 2 DIABETES MELLITUS WITH HYPERGLYCEMIA, WITH LONG-TERM CURRENT USE OF INSULIN: Primary | ICD-10-CM

## 2025-05-27 DIAGNOSIS — N95.1 VAGINAL DRYNESS, MENOPAUSAL: ICD-10-CM

## 2025-05-27 DIAGNOSIS — M65.331 TRIGGER MIDDLE FINGER OF RIGHT HAND: ICD-10-CM

## 2025-05-27 DIAGNOSIS — Z79.4 TYPE 2 DIABETES MELLITUS WITH HYPERGLYCEMIA, WITH LONG-TERM CURRENT USE OF INSULIN: Primary | ICD-10-CM

## 2025-05-27 RX ORDER — PROCHLORPERAZINE 25 MG/1
SUPPOSITORY RECTAL
Qty: 3 EACH | Refills: 5 | Status: SHIPPED | OUTPATIENT
Start: 2025-05-27 | End: 2025-06-26

## 2025-05-27 NOTE — PROGRESS NOTES
Chief Complaint  Follow-up, Diabetes (Couldn't get Trulicity or Dexcom.), and Earache (About 5 days now.)    Subjective        Sabi Haynes presents to [facility]  History of Present Illness  The patient presents for evaluation of diabetes mellitus, recurrent urinary tract infections, and trigger finger.     She has not yet received her Trulicity or Dexcom due to insurance issues and was informed that it was too early for her to receive them. She has been unable to contact [facility] due to her busy schedule. She reports that she has not received any calls from [facility] regarding the CDM program.     She has been experiencing difficulty with her dentures, which have broken four times and are no longer repairable. She has an upcoming appointment with a dentist to discuss the possibility of dental implants. She also reports that her blood glucose levels have been uncontrolled since discontinuing her medication. She has been primarily consuming soup due to her dental issues and has been unable to eat solid food. She is currently on insulin therapy and requires additional needles for her injections. She has been using the Dexcom 6 for glucose monitoring.    She has been experiencing recurrent urinary tract infections and is seeking a daily medication to prevent further infections. She was previously prescribed estrogen cream but did not use it regularly. She has been using a catheter and reports that she rarely experiences vaginal dryness. She has been unable to engage in sexual activity since the age of 52 due to vaginal thinning.    She has been experiencing trigger finger and is seeking a referral to Dr. Toledo for further evaluation and treatment.        Medical History: has a past medical history of Acid reflux, Allergic (2002), Arthritis, Bladder disorder, Bladder paralysis, Blind spot scotoma, left, Condition not found, Coronary artery disease (1975), DDD (degenerative disc  disease), cervical, DDD (degenerative disc disease), lumbar, Diabetes mellitus, Gastric ulcer, Hearing loss, Heart attack, Heart disease, Hemorrhoids, History of confusion, History of migraine headaches, Hyperlipemia, Hyperlipidemia, Hypothyroidism, Incontinence of bowel, Incontinence of urine, Kidney disease, Left hand pain, Limb swelling, Macular degeneration, PONV (postoperative nausea and vomiting), Primary osteoarthritis of right knee (08/24/2018), Recent total retinal detachment, Renal calculi, Renal insufficiency (1965), Right BBB/left ant fasc block, Ringing in ears, Rotator cuff tear (06/17/2021), S/P arthroscopic partial medial meniscectomy (12/08/2017), S/P medial meniscectomy of right knee (10/11/2017), Seasonal allergies, Self-catheterizes urinary bladder, Short-term memory loss, Shortness of breath, Spinal stenosis, Stroke, Thyroid disorder, and Vitamin D deficiency.   Surgical History: has a past surgical history that includes Varicose vein surgery; Cholecystectomy; Hysterectomy; Laparoscopic tubal ligation; Tear duct surgery; Extracorporeal shock wave lithotripsy (Left, 9/13/2019); Other surgical history; Bladder surgery (2002); Carpal tunnel release; Colonoscopy; Cyst Removal; Gallbladder surgery; Total hip arthroplasty (2010); Hysterectomy (1969); Knee surgery (08/22); Eye surgery; Joint replacement; and Shoulder arthroscopy w/ rotator cuff repair (Right, 6/17/2021).   Family History: family history includes Arthritis in her father and mother; Cancer in her father; Diabetes in her mother; Heart disease in her mother; Leukemia in her father; Osteoporosis in her brother; Stroke in her son.   Social History: reports that she has never smoked. She has never been exposed to tobacco smoke. She has never used smokeless tobacco. She reports that she does not drink alcohol and does not use drugs.  Immunization History   Administered Date(s) Administered    COVID-19 (PFIZER) Purple Cap Monovalent 05/14/2021,  "06/04/2021    Covid-19 (Pfizer) Gray Cap Monovalent 01/19/2022    PPD Test 06/22/2021    Tdap 04/11/2017, 08/16/2022       Objective   Vital Signs:  /66   Pulse 91   Temp 98.1 °F (36.7 °C) (Oral)   Ht 167.6 cm (66\")   Wt 77.4 kg (170 lb 9.6 oz)   SpO2 98%   BMI 27.54 kg/m²   Estimated body mass index is 27.54 kg/m² as calculated from the following:    Height as of this encounter: 167.6 cm (66\").    Weight as of this encounter: 77.4 kg (170 lb 9.6 oz).             ROS:  Review of Systems   Constitutional:  Negative for fatigue and fever.   HENT:  Negative for congestion, ear pain and sinus pressure.    Respiratory:  Negative for cough, chest tightness and shortness of breath.    Cardiovascular:  Negative for chest pain, palpitations and leg swelling.   Gastrointestinal:  Negative for abdominal pain and diarrhea.   Genitourinary:  Negative for dysuria and frequency.   Neurological:  Negative for speech difficulty, headache and confusion.   Psychiatric/Behavioral:  Negative for agitation and behavioral problems.       Physical Exam  Vitals reviewed.   Constitutional:       Appearance: Normal appearance.   HENT:      Right Ear: Tympanic membrane normal.      Left Ear: Tympanic membrane normal.      Nose: Nose normal.   Eyes:      Extraocular Movements: Extraocular movements intact.      Conjunctiva/sclera: Conjunctivae normal.      Pupils: Pupils are equal, round, and reactive to light.   Cardiovascular:      Rate and Rhythm: Normal rate and regular rhythm.   Pulmonary:      Effort: Pulmonary effort is normal.      Breath sounds: Normal breath sounds.   Abdominal:      General: Bowel sounds are normal.   Musculoskeletal:         General: Normal range of motion.      Cervical back: Normal range of motion.   Skin:     General: Skin is warm and dry.   Neurological:      General: No focal deficit present.      Mental Status: She is alert and oriented to person, place, and time.   Psychiatric:         Mood and " Affect: Mood normal.         Behavior: Behavior normal.       Physical Exam  Respiratory: Clear to auscultation, no wheezing, rales or rhonchi  Cardiovascular: Regular rate and rhythm, no murmurs, rubs, or gallops      Result Review     The following data was reviewed by: Drea Villalpando MD on 05/27/2025:  Common labs          8/1/2024    16:06 10/24/2024    13:05 1/24/2025    17:33   Common Labs   Glucose 113   143    BUN 17   20    Creatinine 1.08   1.06    Sodium 140   138    Potassium 4.6   4.1    Chloride 107   100    Calcium 9.4   9.5    Albumin 4.3   4.3    Total Bilirubin 0.3   0.4    Alkaline Phosphatase 66   85    AST (SGOT) 18   18    ALT (SGPT) 9   12    Total Cholesterol 198      Triglycerides 184      HDL Cholesterol 35      LDL Cholesterol  130      Hemoglobin A1C 7.70   8.30    Microalbumin, Urine  <1.2  <1.2      Results               Assessment and Plan     Diagnoses and all orders for this visit:    1. Type 2 diabetes mellitus with hyperglycemia, with long-term current use of insulin (Primary)  -     Dulaglutide 0.75 MG/0.5ML solution auto-injector; Inject 0.75 mg under the skin into the appropriate area as directed 1 (One) Time Per Week for 90 days.  Dispense: 6 mL; Refill: 2  -     Continuous Glucose Sensor (Dexcom G6 Sensor); Use Every 10 (Ten) Days for 30 days.  Dispense: 3 each; Refill: 5    2. Trigger middle finger of right hand  -     Ambulatory Referral to Orthopedic Surgery    3. Vaginal dryness, menopausal  -     estradiol (ESTRACE VAGINAL) 0.1 MG/GM vaginal cream; Insert 2 g into the vagina 2 (Two) Times a Week for 30 days.  Dispense: 42.5 g; Refill: 0    Other orders  -     Insulin Pen Needle 31G X 5 MM misc; Use as directed  Dispense: 100 each; Refill: 3      Assessment & Plan  1. Diabetes Mellitus.  - She has not received her Trulicity or Dexcom due to insurance issues.  - A new prescription for Trulicity and Dexcom 7 has been sent to the pharmacy, and they will be ready for pickup  tomorrow.  - She is advised to use the Dexcom sensor every 10 days and replace the sticker every 30 days.  - The importance of maintaining her blood sugar levels was emphasized.    2. Recurrent Urinary Tract Infections.  - Her last documented UTI was in January, caused by Klebsiella.  - The potential use of Macrobid and Bactrim was discussed, but these would not be effective against Klebsiella.  - A prescription for estrogen cream has been provided to apply externally around the urethral area to help prevent infections by keeping the tissue thick and moist.  - The patient was counseled on the application method and the importance of using the cream to prevent infections.    3. Trigger Finger.  - She reports significant pain and difficulty with her finger, consistent with trigger finger.  - A referral to Dr. Toledo has been initiated for further evaluation and treatment.  - The patient expressed a preference for surgical intervention over injections.  - The referral process and expected follow-up were discussed.       I spent 35 minutes caring for Sabi on this date of service. This time includes time spent by me in the following activities:reviewing tests  Follow Up   Return in about 3 months (around 8/27/2025).  Patient was given instructions and counseling regarding her condition or for health maintenance advice. Please see specific information pulled into the AVS if appropriate.   Patient or patient representative verbalized consent for the use of Ambient Listening during the visit with  Drea Villalpando MD for chart documentation. 5/30/2025  14:53 EDT    Drea Villalpando MD

## 2025-05-28 ENCOUNTER — PATIENT OUTREACH (OUTPATIENT)
Dept: CASE MANAGEMENT | Facility: OTHER | Age: 85
End: 2025-05-28
Payer: MEDICARE

## 2025-05-28 DIAGNOSIS — M19.90 ARTHRITIS: ICD-10-CM

## 2025-05-28 DIAGNOSIS — N32.9 BLADDER DISORDER: Primary | ICD-10-CM

## 2025-05-28 DIAGNOSIS — N20.0 RENAL CALCULUS, LEFT: ICD-10-CM

## 2025-05-28 NOTE — OUTREACH NOTE
AMBULATORY CASE MANAGEMENT NOTE    Names and Relationships of Patient/Support Persons: Contact: Cynthia Jason; Relationship: POA/Surrogate/Guardian -     [facility] Interim Update  Spoke with patient guardian/granddaughter Cynthia Eren. Reviewed CCM program, RN role, length an billing. Consented to CCM program.     Patient lives at home alone, hard of hearing and losing her sight. Patient goal to stay at home independent as long as possible. She drives 3-4 times a month during daylight, but may need to be reassessed. Uses a cane at times, doesn't have any other DME. Patient will need phone numbers to [employer] and [employer] for additional resources she may qualify for.     Patient granddaughter has become her POA. Patient has Living will /advance directive at home. RN educated to bring in at next appt to get on file into patient chart.     Patient gets all meds through [facility] on [address].  Dexacom 7 ordered and Cynthia stated she will get it from [facility]. Patient continues to self cath and has for years. Gets chronic UTI;s from constant caths and is able to manage them.     Patient is diabetic, not well controlled. Patient last A1C was 8.3. Patient needs to have updated labs drawn. Patient having difficulty checking blood sugars. Needs education from  nursing services. Cynthia stated patient is picky about who comes into her house and resistant to assistance.     Patient is spouse of , may need to see what additional spousal benefits patient is entitled to. Cynthia verbalized understanding. Notified will call back next week and Cynthia verbalized agreement    Emailed info to [employer] to Cynthia.     Adult Patient Profile  Questions/Answers      Flowsheet Row Most Recent Value   Symptoms/Conditions Managed at Home genitourinary, diabetes, type 2, HEENT (head, eyes, ears, nose, throat)   Barriers to Managing Health understanding health advice, treatment side effects,  stress of chronic illness, medication side effects   Diabetes Management Strategies blood glucose testing, medication therapy   Frequency of Blood Glucose Testing before meals and at bedtime   A1C Target less than 7   Last A1C Result 8.3 on 1.24.25   Diabetes Self-Management Outcome not well   Genitourinary Symptoms/Conditions unable to void/empty   Genitourinary Self-Management Outcome unsure   Genitourinary Comment frequent UTI's with self cathing.   HEENT Symptoms/Conditions vision problem(s), ear problem(s)   HEENT Management Strategies other (see comments), activity   HEENT Self-Management Outcome unsure   HEENT Comment hard of hearing, losing sight. stay independent as long as possible in home   Identifying Life Goals stay independent at home as long as possible   Identifying Health Goals keep illness under control, learning to care for self   Q1: How often do you have a drink containing alcohol? Never   Q2: How many drinks containing alcohol do you have on a typical day when you are drinking? None   Q3: How often do you have six or more drinks on one occasion? Never   Audit-C Score 0        Send Education  Questions/Answers      Flowsheet Row Most Recent Value   Advanced Directives: Patient Has        SDOH updated and reviewed with the patient during this program:  --     Alcohol Use: Not At Risk (5/28/2025)    AUDIT-C     Frequency of Alcohol Consumption: Never     Average Number of Drinks: Patient does not drink     Frequency of Binge Drinking: Never      --     Depression: Not at risk (5/1/2025)    PHQ-2     PHQ-2 Score: 0          Financial Resource Strain: Low Risk  (5/28/2025)    Overall Financial Resource Strain (CARDIA)     Difficulty of Paying Living Expenses: Not hard at all      --     Food Insecurity: No Food Insecurity (5/28/2025)    Hunger Vital Sign     Worried About Running Out of Food in the Last Year: Never true     Ran Out of Food in the Last Year: Never true        --     Housing  Stability: Unknown (5/28/2025)    Housing Stability Vital Sign     Unable to Pay for Housing in the Last Year: No     Homeless in the Last Year: No      --     Interpersonal Safety: Not At Risk (5/28/2025)    Humiliation, Afraid, Rape, and Kick questionnaire     Fear of Current or Ex-Partner: No     Emotionally Abused: No     Physically Abused: No     Sexually Abused: No      --     Physical Activity: Inactive (5/28/2025)    Exercise Vital Sign     Days of Exercise per Week: 0 days     Minutes of Exercise per Session: 0 min      --     Social Connections: Moderately Isolated (5/28/2025)    Social Connection and Isolation Panel [NHANES]     Frequency of Communication with Friends and Family: More than three times a week     Frequency of Social Gatherings with Friends and Family: More than three times a week     Attends Alevism Services: 1 to 4 times per year     Active Member of Clubs or Organizations: No     Attends Club or Organization Meetings: Never     Marital Status:       --     Stress: No Stress Concern Present (5/28/2025)    [employer] - Occupational Stress Questionnaire     Feeling of Stress : Only a little      --     Tobacco Use: Low Risk  (5/27/2025)    Patient History     Smoking Tobacco Use: Never     Smokeless Tobacco Use: Never     Passive Exposure: Never      --     Transportation Needs: No Transportation Needs (5/28/2025)    PRAPARE - Transportation     Lack of Transportation (Medical): No     Lack of Transportation (Non-Medical): No      --     Utilities: Not At Risk (5/28/2025)    [facility] Utilities     Threatened with loss of utilities: No       Education Documentation  unresolved/worsening symptoms, taught by [facility] Pedroza RN at 5/28/2025  1:33 PM.  Learner: Family  Readiness: Eager  Method: Explanation  Response: Verbalizes Understanding    sick day management, taught by [facility]ence Pedroza, RN at 5/28/2025  1:33 PM.  Learner: Family  Readiness:  Eager  Method: Explanation  Response: Verbalizes Understanding    preventing complications, taught by [facility]ence Pedroza RN at 5/28/2025  1:33 PM.  Learner: Family  Readiness: Eager  Method: Explanation  Response: Verbalizes Understanding    complications, taught by [facility]ence Pedroza RN at 5/28/2025  1:33 PM.  Learner: Family  Readiness: Eager  Method: Explanation  Response: Verbalizes Understanding    hypoglycemia identification and treatment, taught by [facility]ence Pedroza RN at 5/28/2025  1:33 PM.  Learner: Family  Readiness: Eager  Method: Explanation  Response: Verbalizes Understanding    hyperglycemia identification and treatment, taught by [facility]ence Pedroza RN at 5/28/2025  1:33 PM.  Learner: Family  Readiness: Eager  Method: Explanation  Response: Verbalizes Understanding    blood glucose monitoring, taught by [facility]ence Pedroza RN at 5/28/2025  1:33 PM.  Learner: Family  Readiness: Eager  Method: Explanation  Response: Verbalizes Understanding    medication management, taught by [facility]ence Pedroza RN at 5/28/2025  1:33 PM.  Learner: Family  Readiness: Eager  Method: Explanation  Response: Verbalizes Understanding    mental health, taught by [facility]ence Pedroza RN at 5/28/2025  1:33 PM.  Learner: Family  Readiness: Eager  Method: Explanation  Response: Verbalizes Understanding    physical activity benefits, taught by [facility]ence Pedroza RN at 5/28/2025  1:33 PM.  Learner: Family  Readiness: Eager  Method: Explanation  Response: Verbalizes Understanding    healthy eating, taught by [facility]ence Pedroza RN at 5/28/2025  1:33 PM.  Learner: Family  Readiness: Eager  Method: Explanation  Response: Verbalizes Understanding    A1C definition, taught by [facility]ence Pedroza RN at 5/28/2025  1:33 PM.  Learner: Family  Readiness: Eager  Method: Explanation  Response: Verbalizes Understanding    treatment, taught by [facility]ence Pedroza RN at 5/28/2025  1:33 PM.  Learner: Family  Readiness: Eager  Method:  Explanation  Response: Verbalizes Understanding    diabetes, type 2, taught by Essence Pedroza RN at 5/28/2025  1:33 PM.  Learner: Family  Readiness: Eager  Method: Explanation  Response: Verbalizes Understanding    When to Seek Medical Attention, taught by Essence Pedroza RN at 5/28/2025  1:33 PM.  Learner: Family  Readiness: Eager  Method: Explanation  Response: Verbalizes Understanding    Infection Prevention, taught by Essence Pedroza RN at 5/28/2025  1:33 PM.  Learner: Family  Readiness: Eager  Method: Explanation  Response: Verbalizes Understanding    Signs/Symptoms, taught by Essence Pedroza RN at 5/28/2025  1:33 PM.  Learner: Family  Readiness: Eager  Method: Explanation  Response: Verbalizes Understanding    Description, taught by Essence Pedroza RN at 5/28/2025  1:33 PM.  Learner: Family  Readiness: Eager  Method: Explanation  Response: Verbalizes Understanding    Indications, taught by Essence Pedroza RN at 5/28/2025  1:33 PM.  Learner: Family  Readiness: Eager  Method: Explanation  Response: Verbalizes Understanding    Procedure Review, taught by Essence Pedroza RN at 5/28/2025  1:33 PM.  Learner: Family  Readiness: Eager  Method: Explanation  Response: Verbalizes Understanding    Understanding Your Risk for Falls, taught by Essence Pedroza RN at 5/28/2025  1:33 PM.  Learner: Family  Readiness: Eager  Method: Explanation  Response: Verbalizes Understanding    Fall Prevention in the Home, Adult, taught by Essence Pedroza RN at 5/28/2025  1:33 PM.  Learner: Family  Readiness: Eager  Method: Explanation  Response: Verbalizes Understanding    Unresolved/Worsening Symptoms, taught by Essence Pedroza RN at 5/28/2025  1:33 PM.  Learner: Family  Readiness: Eager  Method: Explanation  Response: Verbalizes Understanding    Safety, taught by Essence Pedroza RN at 5/28/2025  1:33 PM.  Learner: Family  Readiness: Eager  Method: Explanation  Response: Verbalizes  Understanding    Medication Management, taught by Essence Pedroza, RN at 5/28/2025  1:33 PM.  Learner: Family  Readiness: Eager  Method: Explanation  Response: Verbalizes Understanding    Home Safety, taught by Essence Pedroza, RN at 5/28/2025  1:33 PM.  Learner: Family  Readiness: Eager  Method: Explanation  Response: Verbalizes Understanding    Energy Conservation, taught by Essence Pedroza, RN at 5/28/2025  1:33 PM.  Learner: Family  Readiness: Eager  Method: Explanation  Response: Verbalizes Understanding    Risk Factors, taught by Essence Pedroza, RN at 5/28/2025  1:33 PM.  Learner: Family  Readiness: Eager  Method: Explanation  Response: Verbalizes Understanding          Essence ALANIS  Ambulatory Case Management    5/28/2025, 13:39 EDT

## 2025-05-30 ENCOUNTER — PATIENT OUTREACH (OUTPATIENT)
Dept: CASE MANAGEMENT | Facility: OTHER | Age: 85
End: 2025-05-30
Payer: MEDICARE

## 2025-05-30 DIAGNOSIS — M19.90 ARTHRITIS: Primary | ICD-10-CM

## 2025-05-30 DIAGNOSIS — N32.9 BLADDER DISORDER: ICD-10-CM

## 2025-05-30 RX ORDER — ESTRADIOL 0.1 MG/G
2 CREAM VAGINAL 2 TIMES WEEKLY
Qty: 42.5 G | Refills: 0 | Status: SHIPPED | OUTPATIENT
Start: 2025-06-02 | End: 2025-07-02

## 2025-05-30 NOTE — OUTREACH NOTE
[facility] End of Month Documentation    This Chronic Medical Management Care Plan for Sabi Haynes, 84 y.o. female, has been a new plan of care implemented; established; monitored and managed; reviewed and a new plan of care implemented for the month of May.  A cumulative time of 50  minutes was spent on this patient record this month, including chart review; electronic communication with primary care provider; phone call with relative.    Regarding the patient's problems: has Renal calculus, left; Rotator cuff tear; S/P rotator cuff surgery; Bladder disorder; Hyperlipemia; Hypertension; Diabetes mellitus; Aftercare following right shoulder arthroscopy; Chronic constipation; Swelling of upper arm; Vitamin B12 deficiency; Right shoulder pain; Arthritis; Onychomycosis; Dysuria; Urinary tract infection with hematuria; Pain in right upper arm; Hyperlipidemia; and Type 2 diabetes mellitus with hyperglycemia, with long-term current use of insulin on their problem list., the following items were addressed: medical records; medications; transitions to medical care; changes to medical care and any changes can be found within the plan section of the note.  A detailed listing of time spent for chronic care management is tracked within each outreach encounter.  Current medications include:  has a current medication list which includes the following prescription(s): easy touch alcohol prep medium, ascorbic acid, baclofen, chlorhexidine, vitamin d3, dexcom g6 sensor, dexcom g6 transmitter, dulaglutide, ezetimibe, famotidine, fluticasone, lantus solostar, insulin pen needle, freestyle, levothyroxine, and vitamin b-12. and the patient is reported to be patient is compliant with medication protocol,  Medications are reported to be non-effective in controlling symptoms and changes have been made to the medication protocol.  Regarding these diagnoses, referrals were made to the following provider(s):  NA.  All notes on chart for PCP to  review.    The patient was monitored remotely for activity level; blood glucose; mood & behavior; pain; medications.    The patient's physical needs include:  needs assistance with ADLs; physical healthcare; eye care; medication education; hearing care; help taking medications as prescribed.     The patient's mental support needs include:  continued support    The patient's cognitive support needs include:  continued support    The patient's psychosocial support needs include:  continued support    The patient's functional needs include: eye care; needs assistance for ADLs; medication education    The patient's environmental needs include:  resources for disability needs    Care Plan overall comments:  Needs [facility] info    Refer to previous outreach notes for more information on the areas listed above.    Monthly Billing Diagnoses  (M19.90) Arthritis    (N32.9) Bladder disorder    Medications   Medications have been reconciled    Care Plan progress this month:      Recently Modified Care Plans Updates made since 4/29/2025 12:00 AM      No recently modified care plans.             Diabetes Type 2       Protect Myself From Diabetes Complications       Start:  05/28/25    Expected End:  11/28/25         My Protect Myself From Diabetes Complications To Do List       Start:  05/28/25       Why is this important?Having diabetes puts you at risk for complications, such as kidney disease, heart disease, nerve damage and eye or dental problems.You can manage your risk by making healthy lifestyle choices and getting regular checkups.            Monitor My Blood Sugar       Start:  05/28/25    Expected End:  11/28/25         My Blood Sugar Management To Do List       Start:  05/28/25       Why is this important?Checking your blood sugar (glucose) at home helps to keep it from getting very high or very low.Writing the results in a diary or log, or using an mara, helps your diabetes  care provider know how to care for you.Your blood sugar (glucose) log should have the time, date and results.Also write down the amount of insulin or other medicine that you take.            Learn About Diabetes       Start:  05/28/25    Expected End:  11/28/25         My Learn About Diabetes To Do List       Start:  05/28/25       Why is this important?Learning about diabetes can help you to manage it.            Perform Foot Care       Start:  05/28/25    Expected End:  11/28/25         My Foot Care To Do List       Start:  05/28/25       Why is this important?Good foot care is very important when you have diabetes.There are many things you can do to keep your feet healthy and catch a problem early.            Manage My Stress       Start:  05/28/25    Expected End:  11/28/25         My Stress Management To Do List       Start:  05/28/25       Why is this important?When you are stressed down or upset your body reacts too.            Find Ways to Be Active       Start:  05/28/25    Expected End:  11/28/25         My Activity To Do List       Start:  05/28/25       Why is this important?Being more active can help you to manage your blood sugar (glucose) levels.Being active can also help to prevent diabetes complications.            Optimal Care Coordination of a Patient Experiencing Diabetes, Type 2       Start:  05/28/25    Expected End:  11/28/25         Alleviate Barriers to Glycemic Management       Start:  05/28/25            Monitor and Manage Follow-Up for Comorbidities       Start:  05/28/25            Optimize Functional Ability       Start:  05/28/25            Support Wellbeing and Self-Management Success       Start:  05/28/25              General Plan of Care       Make and Keep All Appointments       Start:  05/28/25    Expected End:  11/28/25         My Appointments To Do List       Start:  05/28/25       Why is this important?Part of staying healthy is seeing the doctor for follow-up care.If you  forget your appointments, there are some things you can do to stay on track.            Find Help in My Community       Start:  05/28/25    Expected End:  11/28/25         My Community Help To Do List       Start:  05/28/25       Why is this important?Knowing how and where to find help for yourself or family in your neighborhood and community is an important skill.You will want to take some steps to learn how.            Protect My Health       Start:  05/28/25    Expected End:  11/28/25         My Health Protection To Do List       Start:  05/28/25       Why is this important?Screening tests can find diseases early when they are easier to treat.Your doctor or nurse will talk with you about which tests are important for you.Getting shots for common diseases like the flu and shingles will help prevent them.            Manage My Emotions       Start:  05/28/25    Expected End:  11/28/25         My Emotion Management To Do List       Start:  05/28/25       Why is this important?When you are stressed, down or upset, your body reacts too.For example, your blood pressure may get higher; you may have a headache or stomachache.When your emotions get the best of you, your body's ability to fight off cold and flu gets weak.These steps will help you manage your emotions.            Maintain My Quality of Life       Start:  05/28/25    Expected End:  11/28/25         My Quality of Life To Do List       Start:  05/28/25       Why is this important?Having a long-term illness can be scary.It can also be stressful for you and your caregiver.These steps may help.            Optimal Care Coordination       Start:  05/28/25    Expected End:  11/28/25         Develop Relationship to Effect Behavior Change       Start:  05/28/25            Mutually Develop and Foster Achievement of Patient Goals       Start:  05/28/25            Support Psychosocial Response to Risk or Actual Health Condition       Start:  05/28/25            Support  and Maintain Acceptable Degree of Health, Comfort and Happiness       Start:  05/28/25                Current Specialty Plan of Care Status signed by both patient and provider    Instructions   Patient was provided an electronic copy of care plan  CCM services were explained and offered and patient has accepted these services.  Patient has given their written consent to receive CCM services and understands that this includes the authorization of electronic communication of medical information with the other treating providers.  Patient understands that they may stop CCM services at any time and these changes will be effective at the end of the calendar month and will effectively revocate the agreement of CCM services.  Patient understands that only one practitioner can furnish and be paid for CCM services during one calendar month.  Patient also understands that there may be co-payment or deductible fees in association with CCM services.  Patient will continue with at least monthly follow-up calls with the Ambulatory .    Essence ALANIS  Ambulatory Case Management    5/30/2025, 10:13 EDT

## 2025-05-31 ENCOUNTER — HOSPITAL ENCOUNTER (EMERGENCY)
Facility: HOSPITAL | Age: 85
Discharge: HOME | End: 2025-05-31
Attending: EMERGENCY MEDICINE
Payer: MEDICARE

## 2025-05-31 ENCOUNTER — APPOINTMENT (OUTPATIENT)
Dept: GENERAL RADIOLOGY | Facility: HOSPITAL | Age: 85
End: 2025-05-31
Payer: MEDICARE

## 2025-05-31 VITALS
HEIGHT: 66 IN | RESPIRATION RATE: 18 BRPM | TEMPERATURE: 98.3 F | DIASTOLIC BLOOD PRESSURE: 68 MMHG | SYSTOLIC BLOOD PRESSURE: 154 MMHG | BODY MASS INDEX: 26.79 KG/M2 | WEIGHT: 166.67 LBS | OXYGEN SATURATION: 97 % | HEART RATE: 79 BPM

## 2025-05-31 DIAGNOSIS — N39.0 ACUTE UTI: ICD-10-CM

## 2025-05-31 DIAGNOSIS — J18.9 PNEUMONIA OF LEFT LOWER LOBE DUE TO INFECTIOUS ORGANISM: Primary | ICD-10-CM

## 2025-05-31 LAB
BACTERIA UR QL AUTO: ABNORMAL /HPF
BILIRUB UR QL STRIP: NEGATIVE
CLARITY UR: ABNORMAL
COLOR UR: ABNORMAL
FLUAV RNA RESP QL NAA+PROBE: NOT DETECTED
FLUBV RNA RESP QL NAA+PROBE: NOT DETECTED
GLUCOSE UR STRIP-MCNC: NEGATIVE MG/DL
HGB UR QL STRIP.AUTO: ABNORMAL
HYALINE CASTS UR QL AUTO: ABNORMAL /LPF
KETONES UR QL STRIP: ABNORMAL
LEUKOCYTE ESTERASE UR QL STRIP.AUTO: ABNORMAL
NITRITE UR QL STRIP: POSITIVE
PH UR STRIP.AUTO: 5.5 [PH] (ref 5–8)
PROT UR QL STRIP: ABNORMAL
RBC # UR STRIP: ABNORMAL /HPF
REF LAB TEST METHOD: ABNORMAL
RSV RNA RESP QL NAA+PROBE: NOT DETECTED
S PYO AG THROAT QL: NEGATIVE
SARS-COV-2 RNA RESP QL NAA+PROBE: NOT DETECTED
SP GR UR STRIP: 1.02 (ref 1–1.03)
SQUAMOUS #/AREA URNS HPF: ABNORMAL /HPF
UROBILINOGEN UR QL STRIP: ABNORMAL
WBC # UR STRIP: ABNORMAL /HPF

## 2025-05-31 PROCEDURE — 87637 SARSCOV2&INF A&B&RSV AMP PRB: CPT | Performed by: EMERGENCY MEDICINE

## 2025-05-31 PROCEDURE — 71045 X-RAY EXAM CHEST 1 VIEW: CPT

## 2025-05-31 PROCEDURE — 87880 STREP A ASSAY W/OPTIC: CPT | Performed by: EMERGENCY MEDICINE

## 2025-05-31 PROCEDURE — 87086 URINE CULTURE/COLONY COUNT: CPT

## 2025-05-31 PROCEDURE — 81001 URINALYSIS AUTO W/SCOPE: CPT

## 2025-05-31 PROCEDURE — 87186 SC STD MICRODIL/AGAR DIL: CPT

## 2025-05-31 PROCEDURE — 87077 CULTURE AEROBIC IDENTIFY: CPT

## 2025-05-31 PROCEDURE — 87081 CULTURE SCREEN ONLY: CPT | Performed by: EMERGENCY MEDICINE

## 2025-05-31 PROCEDURE — 99283 EMERGENCY DEPT VISIT LOW MDM: CPT

## 2025-05-31 RX ORDER — DOXYCYCLINE 100 MG/1
100 CAPSULE ORAL 2 TIMES DAILY
Qty: 10 CAPSULE | Refills: 0 | Status: SHIPPED | OUTPATIENT
Start: 2025-05-31 | End: 2025-06-05

## 2025-05-31 NOTE — ED PROVIDER NOTES
"Time: 2:00 PM EDT  Date of encounter:  5/31/2025  Independent Historian/Clinical History and Information was obtained by:   Patient    History is limited by: N/A    Chief Complaint: URI      History of Present Illness:  Patient is a 84 y.o. year old female who presents to the emergency department for evaluation of cough, sore throat, congestion, ear fullness and eye drainage for the past week. She states that she attended her grandson's graduation last week and began to notice symptoms.She has had a low grade fever, chills and decreased appetite. She describes the eye drainage as watery and purulent in nature that is most prominent in the morning. She states she has decreased vision in her left eye. She denies vision changes or diplopia. She denies nausea, vomiting or diarrhea. She has been taking OTC DM cough medicine and states it has made a significant improvement. She denies known sick contacts.  Patient is requesting that her urine be checked as she self caths at home and wants to ensure she does not have infection      Patient Care Team  Primary Care Provider: Drea Villalpando MD    Past Medical History:     Allergies   Allergen Reactions    Atorvastatin Other (See Comments)     BONE PAIN  Other reaction(s): Other (See Comments)  BONE PAIN    Ibuprofen Other (See Comments)     HISTORY OF GI ULCERS  Other reaction(s): Other (See Comments)  HISTORY OF GI ULCERS    Celecoxib Other (See Comments)     Pt unsure    Ciprofloxacin Hives and Other (See Comments)     Stomach cramping    Exenatide Other (See Comments)     Pt unsure    Glipizide Other (See Comments)     Patient unsure    Pioglitazone Other (See Comments)     Pt unsure    Rifampin Other (See Comments)     UNKNOWN REACTION    Sitagliptin Other (See Comments)     Pt unsure    Acarbose Other (See Comments)     cough    Aspirin Other (See Comments), GI Intolerance and Unknown - Low Severity     \"MAKES ME LIGHTHEADED\"  Other reaction(s): GI Intolerance, Other " "(See Comments), Unknown - Low Severity  States she can only take asa 81mg. Cannot take \"antionette\" asa  \"MAKES ME LIGHTHEADED\"    Atenolol Other (See Comments)     sleepy  Other reaction(s): Other (See Comments)  sleepy    Digoxin Other (See Comments)     \"MAKES ME FALL ASLEEP\"  Other reaction(s): Other (See Comments)  \"MAKES ME FALL ASLEEP\"    Hydromorphone Other (See Comments)     Reaction(s): Unknown; Note: reaction- hives djc jan 2015    Latex Rash    Meperidine Other (See Comments)     \"HARD TIME WAKING UP\"  Other reaction(s): Other (See Comments)  \"HARD TIME WAKING UP\"    Metformin Diarrhea    Morphine Other (See Comments)     \"HARD TIME WAKING UP\"  Other reaction(s): Other (See Comments)  \"HARD TIME WAKING UP\"    Propoxyphene Other (See Comments)     Reaction(s): Unknown; Note: UNKNOWN     Past Medical History:   Diagnosis Date    Acid reflux     Allergic 2002    Seasonal    Arthritis     Bladder disorder     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 OCCURRED IN THE 1970'S. SAW DR BAUTISTA IN [place]    Heart disease     Hemorrhoids     History of confusion     History of migraine headaches     Hyperlipemia     Hyperlipidemia     Hypothyroidism     Incontinence of bowel     DUE TO SPINAL STENOSIS    Incontinence of urine     DUE TO SPINAL STENOSIS    Kidney disease     Left hand pain     Limb swelling     Macular degeneration     PONV (postoperative nausea and vomiting)     Primary osteoarthritis of right knee 08/24/2018    Recent total retinal detachment     Renal calculi     Renal insufficiency 1965    Right BBB/left ant fasc block     Ringing in ears     Rotator cuff tear 06/17/2021    S/P arthroscopic " partial medial meniscectomy 12/08/2017    right knee    S/P medial meniscectomy of right knee 10/11/2017    Seasonal allergies     Self-catheterizes urinary bladder     DUE TO SPINAL STENOSIS    Short-term memory loss     MILD    Shortness of breath     Spinal stenosis     Stroke     no residual    Thyroid disorder     Vitamin D deficiency      Past Surgical History:   Procedure Laterality Date    BLADDER SURGERY  2002    CARPAL TUNNEL RELEASE      CHOLECYSTECTOMY      COLONOSCOPY      CYST REMOVAL      EXTRACORPOREAL SHOCK WAVE LITHOTRIPSY (ESWL) Left 9/13/2019    Procedure: EXTRACORPOREAL SHOCKWAVE LITHOTRIPSY;  Surgeon: Pipe Pat MD;  Location: [facility] OR [facility];  Service: Urology    EYE SURGERY      CATARACT SURGERY    GALLBLADDER SURGERY      HYSTERECTOMY      HYSTERECTOMY  1969    JOINT REPLACEMENT      joint surgery    KNEE SURGERY  08/22    LAPAROSCOPIC TUBAL LIGATION      OTHER SURGICAL HISTORY      Artificial joint/limbs    SHOULDER ARTHROSCOPY W/ ROTATOR CUFF REPAIR Right 6/17/2021    Procedure: RIGHT SHOULDER ARTHROSCOPY, SUBACROMIAL DECOMPRESSION, DISTAL CLAVICULECTOMY, ROTATOR CUFF REPAIR;  Surgeon: Henri Quintero MD;  Location: [facility] OR [facility];  Service: Orthopedics;  Laterality: Right;    TEAR DUCT SURGERY      TOTAL HIP ARTHROPLASTY  2010    HAS HAD BOTH HIPS REPLACED    VARICOSE VEIN SURGERY       Family History   Problem Relation Age of Onset    Heart disease Mother     Diabetes Mother     Arthritis Mother     Cancer Father     Leukemia Father     Arthritis Father     Osteoporosis Brother     Stroke Son     Malig Hyperthermia Neg Hx        Home Medications:  Prior to Admission medications    Medication Sig Start Date End Date Taking? Authorizing Provider   Alcohol Swabs (Easy Touch Alcohol Prep Medium) 70 % pads 1 application  by Epidural route 3 (Three) Times a Day. 5/1/25   Drea Villalpando MD   Ascorbic Acid 100 MG chewable tablet  6/14/22   Provider, MD Stephanie   Baclofen  (LIORESAL) 5 MG tablet Take 1 tablet by mouth.  Patient not taking: Reported on 4/17/2025 4/8/25   ProviderStephaine MD   chlorhexidine (PERIDEX) 0.12 % solution Apply 15 mL to the mouth or throat. 4/8/25   Stephanie Jean Baptiste MD   Cholecalciferol (VITAMIN D3) 5000 units capsule capsule Take 1 capsule by mouth Daily.    ProviderStephanie MD   Continuous Glucose Sensor (Dexcom G6 Sensor) Use Every 10 (Ten) Days for 30 days. 5/27/25 6/26/25  Drea Villalpando MD   Continuous Glucose Transmitter (Dexcom G6 Transmitter) misc Place 1 Device on the skin as directed by provider Continuous for 30 days. 5/1/25 5/31/25  Drea Villalpando MD   Dulaglutide 0.75 MG/0.5ML solution auto-injector Inject 0.75 mg under the skin into the appropriate area as directed 1 (One) Time Per Week for 90 days. 5/27/25 8/25/25  Drea Villalpando MD   estradiol (ESTRACE VAGINAL) 0.1 MG/GM vaginal cream Insert 2 g into the vagina 2 (Two) Times a Week for 30 days. 6/2/25 7/2/25  Drea Villalpando MD   ezetimibe (Zetia) 10 MG tablet Take 1 tablet by mouth Daily for 360 days. 9/8/23 5/27/25  Drea Villalpando MD   famotidine (PEPCID) 40 MG tablet Take 1 tablet by mouth Daily for 90 days. 5/1/25 7/30/25  Drea Villalpando MD   fluticasone (FLONASE) 50 MCG/ACT nasal spray 2 sprays into the nostril(s) as directed by provider Daily.  Patient not taking: Reported on 5/27/2025 12/14/22   Panchito Salas PA   Insulin Glargine (Lantus SoloStar) 100 UNIT/ML injection pen Inject 30 Units under the skin into the appropriate area as directed Every Evening for 90 days. 4/17/25 7/16/25  Drea Villalpando MD   Insulin Pen Needle 31G X 5 MM misc Use as directed 5/27/25   Drea Villalpando MD   Lancets (freestyle) lancets Prick finger to check blood sugars up to 3 times a day 11/15/23   Drea Villalpando MD   levothyroxine (Synthroid) 112 MCG tablet Take 1 tablet by mouth Daily for 90 days. 5/1/25 7/30/25  Drea Villalpando MD   vitamin B-12  "(CYANOCOBALAMIN) 500 MCG tablet Take 1 tablet by mouth Daily for 90 days. 5/1/25 7/30/25  Drea Villalpando MD        Social History:   Social History     Tobacco Use    Smoking status: Never     Passive exposure: Never    Smokeless tobacco: Never   Vaping Use    Vaping status: Never Used   Substance Use Topics    Alcohol use: No    Drug use: No         Review of Systems:  Review of Systems   Constitutional:  Positive for appetite change, chills, fatigue and fever.   HENT:  Positive for congestion, ear pain and sore throat. Negative for ear discharge, rhinorrhea, sinus pressure, sneezing, tinnitus and trouble swallowing.    Eyes:  Positive for discharge.   Respiratory:  Positive for cough. Negative for chest tightness, shortness of breath, wheezing and stridor.    Cardiovascular:  Negative for chest pain and palpitations.   Gastrointestinal:  Negative for abdominal pain, nausea and vomiting.   All other systems reviewed and are negative.       Physical Exam:  /88 (Patient Position: Sitting)   Pulse 91   Temp 98.3 °F (36.8 °C) (Oral)   Resp 18   Ht 167.6 cm (66\")   Wt 75.6 kg (166 lb 10.7 oz)   SpO2 96%   BMI 26.90 kg/m²     Physical Exam  Vitals and nursing note reviewed.   Constitutional:       General: She is not in acute distress.     Appearance: Normal appearance. She is well-developed and normal weight. She is not ill-appearing, toxic-appearing or diaphoretic.   HENT:      Head: Normocephalic and atraumatic.      Right Ear: Tympanic membrane and ear canal normal. No drainage, swelling or tenderness. No middle ear effusion.      Left Ear: Tympanic membrane and ear canal normal. No drainage, swelling or tenderness.  No middle ear effusion.      Nose: Congestion present.      Mouth/Throat:      Mouth: Mucous membranes are moist. No oral lesions.      Pharynx: Oropharynx is clear. Uvula midline. No pharyngeal swelling, oropharyngeal exudate, posterior oropharyngeal erythema or uvula swelling.      " Tonsils: No tonsillar exudate or tonsillar abscesses.   Eyes:      Extraocular Movements: Extraocular movements intact.      Right eye: Normal extraocular motion.      Left eye: Normal extraocular motion.      Conjunctiva/sclera: Conjunctivae normal.      Pupils: Pupils are equal, round, and reactive to light.   Cardiovascular:      Rate and Rhythm: Normal rate and regular rhythm.      Heart sounds: Normal heart sounds. No murmur heard.     No friction rub. No gallop.   Pulmonary:      Effort: Pulmonary effort is normal. No respiratory distress.      Breath sounds: No stridor. Rhonchi present. No wheezing or rales.   Chest:      Chest wall: No tenderness.   Abdominal:      General: Abdomen is flat.      Palpations: Abdomen is soft.   Musculoskeletal:         General: Normal range of motion.      Cervical back: Normal range of motion and neck supple.   Skin:     General: Skin is warm and dry.      Capillary Refill: Capillary refill takes less than 2 seconds.   Neurological:      General: No focal deficit present.      Mental Status: She is alert and oriented to person, place, and time.   Psychiatric:         Mood and Affect: Mood normal.         Behavior: Behavior normal.         Thought Content: Thought content normal.         Judgment: Judgment normal.                    Medical Decision Making:    Comorbidities that affect care:    Diabetes mellitus, thyroid disorder, hyperlipidemia, bladder paralysis    External Notes reviewed:    Previous Clinic Note: Patient last seen by family medicine on 5 - 27 - 25      The following orders were placed and all results were independently analyzed by me:  Orders Placed This Encounter   Procedures    COVID-19, FLU A/B, RSV PCR 1 HR TAT - Swab, Nasopharynx    Rapid Strep A Screen - Swab, Throat    Urine Culture - Urine,    Beta Strep Culture, Throat - Swab, Throat    XR Chest 1 View    Urinalysis With Microscopic If Indicated (No Culture) - Urine, Clean Catch    Urinalysis,  Microscopic Only - Urine, Clean Catch       Medications Given in the Emergency Department:  Medications - No data to display     ED Course:         Labs:    Lab Results (last 24 hours)       Procedure Component Value Units Date/Time    COVID-19, FLU A/B, RSV PCR 1 HR TAT - Swab, Nasopharynx [[id number]]  (Normal) Collected: 05/31/25 1321    Specimen: Swab from Nasopharynx Updated: 05/31/25 1605     COVID19 Not Detected     Influenza A PCR Not Detected     Influenza B PCR Not Detected     RSV, PCR Not Detected    Narrative:      Fact sheet for providers: https://www.fda.gov/media/228982/download    Fact sheet for patients: https://www.fda.gov/media/583340/download    Test performed by PCR.    Rapid Strep A Screen - Swab, Throat [[id number]]  (Normal) Collected: 05/31/25 1321    Specimen: Swab from Throat Updated: 05/31/25 1535     Strep A Ag Negative    Beta Strep Culture, Throat - Swab, Throat [[id number]] Collected: 05/31/25 1321    Specimen: Swab from Throat Updated: 05/31/25 1535    Urinalysis With Microscopic If Indicated (No Culture) - Urine, Clean Catch [[id number]]  (Abnormal) Collected: 05/31/25 1417    Specimen: Urine, Clean Catch Updated: 05/31/25 1439     Color, UA Dark Yellow     Appearance, UA Cloudy     pH, UA 5.5     Specific Gravity, UA 1.019     Glucose, UA Negative     Ketones, UA Trace     Bilirubin, UA Negative     Blood, UA Small (1+)     Protein, UA Trace     Leuk Esterase, UA Moderate (2+)     Nitrite, UA Positive     Urobilinogen, UA 1.0 E.U./dL    Urinalysis, Microscopic Only - Urine, Clean Catch [[id number]]  (Abnormal) Collected: 05/31/25 1417    Specimen: Urine, Clean Catch Updated: 05/31/25 1439     RBC, UA 0-2 /HPF      WBC, UA 21-50 /HPF      Bacteria, UA 3+ /HPF      Squamous Epithelial Cells, UA 0-2 /HPF      Hyaline Casts, UA 0-2 /LPF      Methodology Manual Light Microscopy    Urine Culture - Urine, Urine, Clean Catch [[id number]] Collected: 05/31/25 1417    Specimen: Urine, Clean Catch  Updated: 05/31/25 1445             Imaging:    XR Chest 1 View  Result Date: 5/31/2025  XR CHEST 1 VW Date of Exam: 5/31/2025 2:22 PM EDT Indication: cough Comparison: CXR 10/31/2022 Findings: The heart is normal in size. The lungs are well-expanded. Patchy airspace disease at the left lung base is consistent with pneumonia, and is not evident on 10/31/2022. No dense consolidation or mass is seen. Bony structures appear intact.     Impression: Patchy airspace disease at the left lung base is consistent with pneumonia. Electronically Signed: Camacho Ash MD  5/31/2025 2:47 PM EDT  Workstation ID: [id number]        Differential Diagnosis and Discussion:    Cough: Differential diagnosis includes but is not limited to pneumonia, acute bronchitis, upper respiratory infection, ACE inhibitor use, allergic reaction, epiglottitis, seasonal allergies, chemical irritants, exercise-induced asthma, viral syndrome.    PROCEDURES:    Labs were collected in the emergency department and all labs were reviewed and interpreted by me.  X-ray were performed in the emergency department and all X-ray impressions were independently interpreted by me.    No orders to display       Procedures    MDM  Number of Diagnoses or Management Options  Diagnosis management comments: Patient presented to the emergency department today for evaluation of URI symptoms.  Patient had negative viral and strep testing.  Chest x-ray is positive for pneumonia.  Patient self catheterizes at home and requested that we check her urine while she was here as well because she had been feeling unwell.  Urinalysis is positive for acute UTI and we will culture this.  Based on patient's last culture and diagnosis of pneumonia as well today we will begin patient on Augmentin and doxycycline.  Return to the emergency department guidelines provided.       Amount and/or Complexity of Data Reviewed  Clinical lab tests: reviewed and ordered  Tests in the radiology section of  CPT®: reviewed and ordered    Risk of Complications, Morbidity, and/or Mortality  Presenting problems: moderate  Diagnostic procedures: low  Management options: low    Patient Progress  Patient progress: stable       Patient Care Considerations:    SEPSIS was considered but is NOT present in the emergency department as SIRS criteria is not present.      Consultants/Shared Management Plan:    None    Social Determinants of Health:    Patient is independent, reliable, and has access to care.       Disposition and Care Coordination:    Discharged: The patient is suitable and stable for discharge with no need for consideration of admission.    I have explained the patient´s condition, diagnoses and treatment plan based on the information available to me at this time. I have answered questions and addressed any concerns. The patient has a good  understanding of the patient´s diagnosis, condition, and treatment plan as can be expected at this point. The vital signs have been stable. The patient´s condition is stable and appropriate for discharge from the emergency department.      The patient will pursue further outpatient evaluation with the primary care physician or other designated or consulting physician as outlined in the discharge instructions. They are agreeable to this plan of care and follow-up instructions have been explained in detail. The patient has received these instructions in written format and has expressed an understanding of the discharge instructions. The patient is aware that any significant change in condition or worsening of symptoms should prompt an immediate return to this or the closest emergency department or call to 911.  I have explained discharge medications and the need for follow up with the patient/caretakers. This was also printed in the discharge instructions. Patient was discharged with the following medications and follow up:      Medication List        New Prescriptions       amoxicillin-clavulanate 875-125 MG per tablet  Commonly known as: AUGMENTIN  Take 1 tablet by mouth 2 (Two) Times a Day for 5 days.     doxycycline 100 MG capsule  Commonly known as: MONODOX  Take 1 capsule by mouth 2 (Two) Times a Day for 5 days.               Where to Get Your Medications        These medications were sent to [facility]/pharmacy #[id number] - [place], [place] - [address] - [phone]  - [phone] FX  [address] [place] [address]      Hours: 24-hours Phone: [phone]   amoxicillin-clavulanate 875-125 MG per tablet  doxycycline 100 MG capsule      Drea Villalpando MD  [address] [address] [phone]             Final diagnoses:   Pneumonia of left lower lobe due to infectious organism   Acute UTI        ED Disposition       ED Disposition   Discharge    Condition   Stable    Comment   --               This medical record created using voice recognition software.            Tony Granados PA-C  05/31/25 [id number]

## 2025-06-02 LAB
BACTERIA SPEC AEROBE CULT: ABNORMAL
BACTERIA SPEC AEROBE CULT: NORMAL

## 2025-06-06 ENCOUNTER — PATIENT OUTREACH (OUTPATIENT)
Dept: CASE MANAGEMENT | Facility: OTHER | Age: 85
End: 2025-06-06
Payer: MEDICARE

## 2025-06-06 ENCOUNTER — TELEPHONE (OUTPATIENT)
Dept: CASE MANAGEMENT | Facility: OTHER | Age: 85
End: 2025-06-06
Payer: MEDICARE

## 2025-06-06 DIAGNOSIS — M19.90 ARTHRITIS: Primary | ICD-10-CM

## 2025-06-06 DIAGNOSIS — N32.9 BLADDER DISORDER: ICD-10-CM

## 2025-06-06 NOTE — TELEPHONE ENCOUNTER
UTR. No answer, unable to leave message    Will try again in 1 week    Swapna LAGOS RN  Ambulatory

## 2025-06-06 NOTE — OUTREACH NOTE
AMBULATORY CASE MANAGEMENT NOTE    Names and Relationships of Patient/Support Persons: Contact: Sabi Haynes; Relationship: Self -     Patient called this RN back, stated she hasn't been keeping her phone near her due to scam calls.     Patient stated she is heading out to get her medications. Patient stated she's struggling to feel better. Had some vomiting after taking her last doses of antibiotics, but felt better after vomiting. Patient stated she wants info for her blindness. RN notified info has already been emailed in my chart to her    Education Documentation  No documentation found.        Essence ALANIS  Ambulatory Case Management    6/6/2025, 13:20 EDT

## 2025-06-09 NOTE — ED PROVIDER NOTES
"SHARED VISIT ATTESTATION:    This visit was performed by myself and an APC.  I personally approved the management plan/medical decision making and take responsibility for the patient management.      SHARED VISIT NOTE:    Patient is 84 y.o. year old female that presents to the ED for evaluation of URI symptoms..     Physical Exam    ED Course:    /68 (BP Location: Right arm, Patient Position: Sitting)   Pulse 79   Temp 98.3 °F (36.8 °C) (Oral)   Resp 18   Ht 167.6 cm (66\")   Wt 75.6 kg (166 lb 10.7 oz)   SpO2 97%   BMI 26.90 kg/m²       The following orders were placed and all results were independently analyzed by me:  Orders Placed This Encounter   Procedures    COVID-19, FLU A/B, RSV PCR 1 HR TAT - Swab, Nasopharynx    Rapid Strep A Screen - Swab, Throat    Urine Culture - Urine,    Beta Strep Culture, Throat - Swab, Throat    XR Chest 1 View    Urinalysis With Microscopic If Indicated (No Culture) - Urine, Clean Catch    Urinalysis, Microscopic Only - Urine, Clean Catch       Medications Given in the Emergency Department:  Medications - No data to display     ED Course:         Labs:    Lab Results (last 24 hours)       ** No results found for the last 24 hours. **             Imaging:    No Radiology Exams Resulted Within Past 24 Hours    MDM:    Procedures    Labs were collected in the emergency department and all labs were reviewed and interpreted by me.  X-ray were performed in the emergency department and all X-ray impressions were independently interpreted by me.                     Paul Asencio DO  23:19 EDT  06/08/25         Paul Asencio DO  06/08/25 2320    "

## 2025-06-16 ENCOUNTER — OFFICE VISIT (OUTPATIENT)
Dept: ORTHOPEDIC SURGERY | Facility: CLINIC | Age: 85
End: 2025-06-16
Payer: MEDICARE

## 2025-06-16 ENCOUNTER — PREP FOR SURGERY (OUTPATIENT)
Dept: OTHER | Facility: HOSPITAL | Age: 85
End: 2025-06-16
Payer: MEDICARE

## 2025-06-16 VITALS
BODY MASS INDEX: 28.38 KG/M2 | OXYGEN SATURATION: 96 % | WEIGHT: 176.6 LBS | HEIGHT: 66 IN | SYSTOLIC BLOOD PRESSURE: 158 MMHG | DIASTOLIC BLOOD PRESSURE: 75 MMHG | HEART RATE: 83 BPM

## 2025-06-16 DIAGNOSIS — M65.331 TRIGGER MIDDLE FINGER OF RIGHT HAND: ICD-10-CM

## 2025-06-16 DIAGNOSIS — M18.12 OSTEOARTHRITIS OF CARPOMETACARPAL (CMC) JOINT OF LEFT THUMB, UNSPECIFIED OSTEOARTHRITIS TYPE: Primary | ICD-10-CM

## 2025-06-16 DIAGNOSIS — M65.331 TRIGGER MIDDLE FINGER OF RIGHT HAND: Primary | ICD-10-CM

## 2025-06-16 NOTE — PROGRESS NOTES
"Chief Complaint  Initial Evaluation of the Right Hand and Initial Evaluation of the Left Hand     Subjective      Sabi Haynes presents to [facility] for initial evaluation of the right hand in the 3 rd digit.  She notes swelling and triggering.  She has to manually pull the finger back up.  She has pain with FMC and  strength.  She has a bone spur at the base of her metacarpal on the top of the right wrist.  She has osteoarthritis at the base of the thumb.      Allergies   Allergen Reactions    Atorvastatin Other (See Comments)     BONE PAIN  Other reaction(s): Other (See Comments)  BONE PAIN    Ibuprofen Other (See Comments)     HISTORY OF GI ULCERS  Other reaction(s): Other (See Comments)  HISTORY OF GI ULCERS    Celecoxib Other (See Comments)     Pt unsure    Ciprofloxacin Hives and Other (See Comments)     Stomach cramping    Exenatide Other (See Comments)     Pt unsure    Glipizide Other (See Comments)     Patient unsure    Pioglitazone Other (See Comments)     Pt unsure    Rifampin Other (See Comments)     UNKNOWN REACTION    Sitagliptin Other (See Comments)     Pt unsure    Acarbose Other (See Comments)     cough    Aspirin Other (See Comments), GI Intolerance and Unknown - Low Severity     \"MAKES ME LIGHTHEADED\"  Other reaction(s): GI Intolerance, Other (See Comments), Unknown - Low Severity  States she can only take asa 81mg. Cannot take \"antionette\" asa  \"MAKES ME LIGHTHEADED\"    Atenolol Other (See Comments)     sleepy  Other reaction(s): Other (See Comments)  sleepy    Digoxin Other (See Comments)     \"MAKES ME FALL ASLEEP\"  Other reaction(s): Other (See Comments)  \"MAKES ME FALL ASLEEP\"    Hydromorphone Other (See Comments)     Reaction(s): Unknown; Note: reaction- hives djc jan 2015    Latex Rash    Meperidine Other (See Comments)     \"HARD TIME WAKING UP\"  Other reaction(s): Other (See Comments)  \"HARD TIME WAKING UP\"    Metformin Diarrhea    Morphine Other (See " "Comments)     \"HARD TIME WAKING UP\"  Other reaction(s): Other (See Comments)  \"HARD TIME WAKING UP\"    Propoxyphene Other (See Comments)     Reaction(s): Unknown; Note: UNKNOWN        Social History     Socioeconomic History    Marital status:    Tobacco Use    Smoking status: Never     Passive exposure: Never    Smokeless tobacco: Never   Vaping Use    Vaping status: Never Used   Substance and Sexual Activity    Alcohol use: No    Drug use: No    Sexual activity: Defer        I reviewed the patient's chief complaint, history of present illness, review of systems, past medical history, surgical history, family history, social history, medications, and allergy list.     Review of Systems     Constitutional: Denies fevers, chills, weight loss  Cardiovascular: Denies chest pain, shortness of breath  Skin: Denies rashes, acute skin changes  Neurologic: Denies headache, loss of consciousness        Vital Signs:   /75   Pulse 83   Ht 167.6 cm (66\")   Wt 80.1 kg (176 lb 9.6 oz)   SpO2 96%   BMI 28.50 kg/m²          Physical Exam  General: Alert. No acute distress    Ortho Exam        BILATERAL HAND Negative Compression testing/ Negative Tinels. NegativeFinkelsteins. Negative Whitlock's testing. Positive CMC grind testing. Negative Phalens. Full ROM of the hand, fingers, elbow and wrist. Positive Triggering of the digit. Sensation grossly intact to light touch, median, radial and ulnar nerve. Positive AIN, PIN and ulnar nerve motor function intact. Axillary nerve intact. Positive pulses.        Procedures        Imaging Results (Most Recent)       None             Result Review :              Assessment and Plan     Diagnoses and all orders for this visit:    1. Osteoarthritis of carpometacarpal (CMC) joint of left thumb, unspecified osteoarthritis type (Primary)    2. Trigger middle finger of right hand        Discussed the treatment plan with the patient.     Discussed the treatment options with the " patient, operative vs non-operative. The patient expressed understanding and wished to proceed with a right hand trigger finger release of the third digit.      At a follow up I will injection the left hand CMC joint arthritis.     Discussed surgery., Risks/benefits discussed with patient including, but not limited to: infection, bleeding, neurovascular damage, re-rupture, aesthetic deformity, need for further surgery, and death., Surgery pamphlet given., and Call or return if worsening symptoms.    Follow Up     2 weeks postoperatively       Patient was given instructions and counseling regarding her condition or for health maintenance advice. Please see specific information pulled into the AVS if appropriate.     Scribed for Henri Quintero MD by May Dougherty MA.  06/16/25   15:12 EDT      I have personally performed the services described in this document as scribed by the above individual and it is both accurate and complete. Henri Quintero MD 06/16/25

## 2025-06-17 ENCOUNTER — TELEPHONE (OUTPATIENT)
Dept: CASE MANAGEMENT | Facility: OTHER | Age: 85
End: 2025-06-17
Payer: MEDICARE

## 2025-06-17 NOTE — TELEPHONE ENCOUNTER
UTR. No answer, unable to leave message.    Will try again in 1 week.    Essence LAGOS RN  Ambulatory

## 2025-06-18 ENCOUNTER — PATIENT OUTREACH (OUTPATIENT)
Dept: CASE MANAGEMENT | Facility: OTHER | Age: 85
End: 2025-06-18
Payer: MEDICARE

## 2025-06-18 DIAGNOSIS — M19.90 ARTHRITIS: Primary | ICD-10-CM

## 2025-06-18 DIAGNOSIS — N32.9 BLADDER DISORDER: ICD-10-CM

## 2025-06-18 NOTE — OUTREACH NOTE
AMBULATORY CASE MANAGEMENT NOTE    Names and Relationships of Patient/Support Persons: Contact: Sabi Haynes; Relationship: Self -     CCM Interim Update  Patient called asking about how much the 20% would be for her bill. RN educated on CCM program, how insurance is billed and we are not able to determine the exact amount of patient cost, as there are multiple variables which can change the amount patient could have. RN notified patient CCM program with discussed with Granddaughter/POA and consent was received. Notified billing was discussed. Patient stated this RN told her this was free, RN explained CCM vs HRCM program. Patient stated she has spent 9000 dollars on her teeth, then stated she spent 4000 on teeth and broke a tooth.     RN redirected patient several times in conversation to CCM program, reason for CCM (multiple major needs/education) vs HRCM (short minor needs) and again granddaughter was aware. Patient stated she was informed by Dr Villalpando they need to make appt to see me. RN educated on seeing patient when she is at an appt with dr villalpando, or she can set up an appt with me. RN encouraged patient to speak with granddaughter and set appt with RN. Patient verbalized agreement.     RN called and spoke with granddaughter, who stated she will talk to patient they will try to get patient to come in and see this RN. Notified 20% cost would be about 12.32 per month at 20 min billing. Cynthia verbalized agreement          Education Documentation  No documentation found.        Essence ALANIS  Ambulatory Case Management    6/18/2025, 12:34 EDT

## 2025-06-25 ENCOUNTER — TELEPHONE (OUTPATIENT)
Dept: ORTHOPEDIC SURGERY | Facility: CLINIC | Age: 85
End: 2025-06-25
Payer: MEDICARE

## 2025-06-25 NOTE — TELEPHONE ENCOUNTER
Patient Information from Today's Visit    Diagnosis: Recurrent thrombosis      Follow Up Instructions: 12 months.    Labs reviewed.  Symptoms reviewed.    Treatment Summary has been discussed and given to patient: N/A      Current Labs:   Hospital Outpatient Visit on 04/25/2025   Component Date Value Ref Range Status    Sodium 04/25/2025 141  136 - 145 mmol/L Final    Potassium 04/25/2025 4.5  3.5 - 5.1 mmol/L Final    Chloride 04/25/2025 106  98 - 107 mmol/L Final    CO2 04/25/2025 22  20 - 29 mmol/L Final    Anion Gap 04/25/2025 13  7 - 16 mmol/L Final    Glucose 04/25/2025 130 (H)  70 - 99 mg/dL Final    Comment: <70 mg/dL Consistent with, but not fully diagnostic of hypoglycemia.  100 - 125 mg/dL Impaired fasting glucose/consistent with pre-diabetes mellitus.  > 126 mg/dl Fasting glucose consistent with overt diabetes mellitus      BUN 04/25/2025 42 (H)  8 - 23 MG/DL Final    Creatinine 04/25/2025 2.27 (H)  0.80 - 1.30 MG/DL Final    Est, Glom Filt Rate 04/25/2025 30 (L)  >60 ml/min/1.73m2 Final    Comment:    Pediatric calculator link: https://www.kidney.org/professionals/kdoqi/gfr_calculatorped     These results are not intended for use in patients <18 years of age.     eGFR results are calculated without a race factor using  the 2021 CKD-EPI equation. Careful clinical correlation is recommended, particularly when comparing to results calculated using previous equations.  The CKD-EPI equation is less accurate in patients with extremes of muscle mass, extra-renal metabolism of creatinine, excessive creatine ingestion, or following therapy that affects renal tubular secretion.      Calcium 04/25/2025 9.4  8.8 - 10.2 MG/DL Final    Total Bilirubin 04/25/2025 0.5  0.0 - 1.2 MG/DL Final    ALT 04/25/2025 18  8 - 55 U/L Final    AST 04/25/2025 25  15 - 37 U/L Final    Alk Phosphatase 04/25/2025 57  40 - 129 U/L Final    Total Protein 04/25/2025 6.6  6.3 - 8.2 g/dL Final    Albumin 04/25/2025 3.5  3.2 - 4.6 g/dL  SPOKE WITH PATIENT SHE TOOK HER TRULICITY INJECTION AND VITAMINS LAST NIGHT. ADVISED PATIENT WE WILL NEED TO REWSCHEDULE SURGERY. PATIENT VERBALIZED UNDERSTANDING AND WAS TRANSFERRED TO SURGERY SCHEDULER TO RESCHEDULE.

## 2025-06-25 NOTE — TELEPHONE ENCOUNTER
Hub staff attempted to follow warm transfer process and was unsuccessful     Caller: Sabi Haynes    Relationship to patient: Self    Best call back number: [phone]    Patient is needing: PATIENT STATED SHE TOOK TYLENOL SATURDAY AND HAS BEEN TAKING HER VITAMINS - CAN SHE STILL HAVE SURGERY

## 2025-06-30 ENCOUNTER — PATIENT OUTREACH (OUTPATIENT)
Dept: CASE MANAGEMENT | Facility: OTHER | Age: 85
End: 2025-06-30
Payer: MEDICARE

## 2025-06-30 DIAGNOSIS — N32.9 BLADDER DISORDER: ICD-10-CM

## 2025-06-30 DIAGNOSIS — M19.90 ARTHRITIS: Primary | ICD-10-CM

## 2025-06-30 DIAGNOSIS — N20.0 RENAL CALCULUS, LEFT: ICD-10-CM

## 2025-06-30 NOTE — OUTREACH NOTE
[facility] End of Month Documentation    This Chronic Medical Management Care Plan for Sabi Haynes, 84 y.o. female, has been monitored and managed; reviewed and a new plan of care implemented for the month of June.  A cumulative time of 67  minutes was spent on this patient record this month, including chart review; phone call with patient; electronic communication with patient.    Regarding the patient's problems: has Renal calculus, left; Rotator cuff tear; S/P rotator cuff surgery; Bladder disorder; Hyperlipemia; Hypertension; Diabetes mellitus; Aftercare following right shoulder arthroscopy; Chronic constipation; Swelling of upper arm; Vitamin B12 deficiency; Right shoulder pain; Arthritis; Onychomycosis; Dysuria; Urinary tract infection with hematuria; Pain in right upper arm; Hyperlipidemia; Type 2 diabetes mellitus with hyperglycemia, with long-term current use of insulin; Trigger middle finger of right hand; and Trigger finger, right middle finger on their problem list., the following items were addressed: medical records; medications; referrals to community service providers and any changes can be found within the plan section of the note.  A detailed listing of time spent for chronic care management is tracked within each outreach encounter.  Current medications include:  has a current medication list which includes the following prescription(s): easy touch alcohol prep medium, ascorbic acid, baclofen, chlorhexidine, vitamin d3, dulaglutide, estradiol, ezetimibe, famotidine, fluticasone, lantus solostar, insulin pen needle, freestyle, levothyroxine, and vitamin b-12. and the patient is reported to be No data recorded,  Medications are reported to be effective.  Regarding these diagnoses, referrals were made to the following provider(s):  NA.  All notes on chart for PCP to review.    The patient was monitored remotely for blood pressure; activity level; mood & behavior; pain; medications.    The patient's  physical needs include:  needs assistance with ADLs; physical healthcare; eye care; medication education; hearing care; help taking medications as prescribed.     The patient's mental support needs include:  continued support    The patient's cognitive support needs include:  continued support    The patient's psychosocial support needs include:  continued support    The patient's functional needs include: eye care; needs assistance for ADLs; medication education    The patient's environmental needs include:  resources for disability needs    Care Plan overall comments:  No data recorded    Refer to previous outreach notes for more information on the areas listed above.    Monthly Billing Diagnoses  (M19.90) Arthritis    (N20.0) Renal calculus, left    (N32.9) Bladder disorder    Medications   Medications have been reconciled    Care Plan progress this month:      Recently Modified Care Plans Updates made since 5/30/2025 12:00 AM      No recently modified care plans.             Diabetes Type 2       Protect Myself From Diabetes Complications       Start:  05/28/25    Expected End:  11/28/25         My Protect Myself From Diabetes Complications To Do List       Start:  05/28/25       Why is this important?Having diabetes puts you at risk for complications, such as kidney disease, heart disease, nerve damage and eye or dental problems.You can manage your risk by making healthy lifestyle choices and getting regular checkups.            Monitor My Blood Sugar       Start:  05/28/25    Expected End:  11/28/25         My Blood Sugar Management To Do List       Start:  05/28/25       Why is this important?Checking your blood sugar (glucose) at home helps to keep it from getting very high or very low.Writing the results in a diary or log, or using an mara, helps your diabetes care provider know how to care for you.Your blood sugar (glucose) log should have the time, date and results.Also write down the amount of insulin or  other medicine that you take.            Learn About Diabetes       Start:  05/28/25    Expected End:  11/28/25         My Learn About Diabetes To Do List       Start:  05/28/25       Why is this important?Learning about diabetes can help you to manage it.            Perform Foot Care       Start:  05/28/25    Expected End:  11/28/25         My Foot Care To Do List       Start:  05/28/25       Why is this important?Good foot care is very important when you have diabetes.There are many things you can do to keep your feet healthy and catch a problem early.            Manage My Stress       Start:  05/28/25    Expected End:  11/28/25         My Stress Management To Do List       Start:  05/28/25       Why is this important?When you are stressed down or upset your body reacts too.            Find Ways to Be Active       Start:  05/28/25    Expected End:  11/28/25         My Activity To Do List       Start:  05/28/25       Why is this important?Being more active can help you to manage your blood sugar (glucose) levels.Being active can also help to prevent diabetes complications.            Optimal Care Coordination of a Patient Experiencing Diabetes, Type 2       Start:  05/28/25    Expected End:  11/28/25         Alleviate Barriers to Glycemic Management       Start:  05/28/25            Monitor and Manage Follow-Up for Comorbidities       Start:  05/28/25            Optimize Functional Ability       Start:  05/28/25            Support Wellbeing and Self-Management Success       Start:  05/28/25              General Plan of Care       Make and Keep All Appointments       Start:  05/28/25    Expected End:  11/28/25         My Appointments To Do List       Start:  05/28/25       Why is this important?Part of staying healthy is seeing the doctor for follow-up care.If you forget your appointments, there are some things you can do to stay on track.            Find Help in My Community       Start:  05/28/25    Expected End:   11/28/25         My Community Help To Do List       Start:  05/28/25       Why is this important?Knowing how and where to find help for yourself or family in your neighborhood and community is an important skill.You will want to take some steps to learn how.            Protect My Health       Start:  05/28/25    Expected End:  11/28/25         My Health Protection To Do List       Start:  05/28/25       Why is this important?Screening tests can find diseases early when they are easier to treat.Your doctor or nurse will talk with you about which tests are important for you.Getting shots for common diseases like the flu and shingles will help prevent them.            Manage My Emotions       Start:  05/28/25    Expected End:  11/28/25         My Emotion Management To Do List       Start:  05/28/25       Why is this important?When you are stressed, down or upset, your body reacts too.For example, your blood pressure may get higher; you may have a headache or stomachache.When your emotions get the best of you, your body's ability to fight off cold and flu gets weak.These steps will help you manage your emotions.            Maintain My Quality of Life       Start:  05/28/25    Expected End:  11/28/25         My Quality of Life To Do List       Start:  05/28/25       Why is this important?Having a long-term illness can be scary.It can also be stressful for you and your caregiver.These steps may help.            Optimal Care Coordination       Start:  05/28/25    Expected End:  11/28/25         Develop Relationship to Effect Behavior Change       Start:  05/28/25            Mutually Develop and Foster Achievement of Patient Goals       Start:  05/28/25            Support Psychosocial Response to Risk or Actual Health Condition       Start:  05/28/25            Support and Maintain Acceptable Degree of Health, Comfort and Happiness       Start:  05/28/25                Instructions   Patient was provided an electronic copy  of care plan  CCM services were explained and offered and patient has accepted these services.  Patient has given their written consent to receive CCM services and understands that this includes the authorization of electronic communication of medical information with the other treating providers.  Patient understands that they may stop CCM services at any time and these changes will be effective at the end of the calendar month and will effectively revocate the agreement of CCM services.  Patient understands that only one practitioner can furnish and be paid for CCM services during one calendar month.  Patient also understands that there may be co-payment or deductible fees in association with CCM services.  Patient will continue with at least monthly follow-up calls with the Ambulatory .    Essence ALANIS  Ambulatory Case Management    6/30/2025, 13:44 EDT

## 2025-07-07 DIAGNOSIS — E78.5 HYPERLIPIDEMIA, UNSPECIFIED HYPERLIPIDEMIA TYPE: ICD-10-CM

## 2025-07-07 RX ORDER — EZETIMIBE 10 MG/1
10 TABLET ORAL DAILY
Qty: 90 TABLET | Refills: 3 | Status: SHIPPED | OUTPATIENT
Start: 2025-07-07 | End: 2026-07-02

## 2025-07-07 RX ORDER — LEVOTHYROXINE SODIUM 112 UG/1
112 TABLET ORAL DAILY
Qty: 90 TABLET | Refills: 3 | Status: SHIPPED | OUTPATIENT
Start: 2025-07-07 | End: 2025-10-05

## 2025-07-07 NOTE — TELEPHONE ENCOUNTER
Caller: Sabi Haynes    Relationship to patient: Self    Best call back number: [phone]    Patient is needing: PATIENT IS REQUESTING A CALL BACK. SHE HAS SOME MEDICATION QUESTIONS. SHE IS GOING TO HAVE SURGERY ON HER TRIGGER FINGER AND THEY WANT HER TO STOP TAKING HER MEDICATION FOR 7 SEVEN DAYS. PLEASE CALL AND ADVISE.

## 2025-07-07 NOTE — TELEPHONE ENCOUNTER
Caller: Sabi Haynes    Relationship: Self    Best call back number: [phone]     Requested Prescriptions:   Requested Prescriptions     Pending Prescriptions Disp Refills    levothyroxine (Synthroid) 112 MCG tablet 90 tablet 3     Sig: Take 1 tablet by mouth Daily for 90 days.    ezetimibe (Zetia) 10 MG tablet 90 tablet 3     Sig: Take 1 tablet by mouth Daily for 360 days.        Pharmacy where request should be sent: [facility] [phone] [place], [place] [phone] FX     Last office visit with prescribing clinician: 5/27/2025   Last telemedicine visit with prescribing clinician: Visit date not found   Next office visit with prescribing clinician: 8/15/2025     Does the patient have less than a 3 day supply:  [x] Yes  [] No    Would you like a call back once the refill request has been completed: [x] Yes [] No    If the office needs to give you a call back, can they leave a voicemail: [x] Yes [] No    Suzanne Tavarez Rep   07/07/25 09:58 EDT

## 2025-07-08 ENCOUNTER — PATIENT OUTREACH (OUTPATIENT)
Dept: CASE MANAGEMENT | Facility: OTHER | Age: 85
End: 2025-07-08
Payer: MEDICARE

## 2025-07-08 DIAGNOSIS — M19.90 ARTHRITIS: ICD-10-CM

## 2025-07-08 DIAGNOSIS — H54.10 BLINDNESS AND LOW VISION: Primary | ICD-10-CM

## 2025-07-08 NOTE — OUTREACH NOTE
AMBULATORY CASE MANAGEMENT NOTE    Names and Relationships of Patient/Support Persons: Contact: Cynthia Jason; Relationship: Emergency Contact -     CCM Interim Update  Patient remains resistant to discuss CCM with granddaughter and upset. Cynthia and RN discussed meeting with patient in person at 8/15 appt and if anything changes before then she will call this RN    RN will arrange schedule to meet with family.     Chart review completed        Education Documentation  No documentation found.        Essence ALANIS  Ambulatory Case Management    7/8/2025, 10:34 EDT

## 2025-07-17 ENCOUNTER — PREP FOR SURGERY (OUTPATIENT)
Dept: OTHER | Facility: HOSPITAL | Age: 85
End: 2025-07-17
Payer: MEDICARE

## 2025-07-17 ENCOUNTER — TELEPHONE (OUTPATIENT)
Dept: ORTHOPEDIC SURGERY | Facility: CLINIC | Age: 85
End: 2025-07-17
Payer: MEDICARE

## 2025-07-17 DIAGNOSIS — M65.331 TRIGGER MIDDLE FINGER OF RIGHT HAND: Primary | ICD-10-CM

## 2025-07-17 NOTE — TELEPHONE ENCOUNTER
SPOKE WITH PATIENT'S DAUGHTER, LET HER KNOW THAT SHE WILL BE FINE TO SEE THE DENTIST THE DAY BEFORE TRIGGER FINGER RELEASE. SHE VOICED UNDERSTANDING.

## 2025-07-17 NOTE — TELEPHONE ENCOUNTER
Caller: LISA    Relationship: Other    Best call back number:     What is the best time to reach you: ANY     Who are you requesting to speak with (clinical staff, provider,  specific staff member): CLINICAL    What was the call regarding: PATIENT WAS SUPPOSED TO HAVE SURGERY TODAY BUT HAD TO RESCHEDULE TIL 7/30/25.  SHE IS SUPPOSED TO HAVE DENTAL PROCEDURE THE DAY BEFORE AND WANTING TO KNOW IF THAT'S OK OR IF THEY NEED TO RESCHEDULE     Is it okay if the provider responds through [facility]: PLEASE CALL

## 2025-07-30 ENCOUNTER — TELEPHONE (OUTPATIENT)
Dept: ORTHOPEDIC SURGERY | Facility: CLINIC | Age: 85
End: 2025-07-30
Payer: MEDICARE

## 2025-07-30 NOTE — TELEPHONE ENCOUNTER
PATIENT CALLED STATING SHE IS HAVING ISSUES WITH HER EYE AND IT REQUIRES EYE INJECTIONS SO SHE WOULD LIKE TO CANCEL SURGERY TOMORROW. PATIENT WILL CALL BACK WHEN SHE IS ABLE TO RESCHEDULE.

## 2025-08-15 ENCOUNTER — TELEPHONE (OUTPATIENT)
Dept: FAMILY MEDICINE CLINIC | Facility: CLINIC | Age: 85
End: 2025-08-15

## 2025-08-15 DIAGNOSIS — E11.65 TYPE 2 DIABETES MELLITUS WITH HYPERGLYCEMIA, WITH LONG-TERM CURRENT USE OF INSULIN: ICD-10-CM

## 2025-08-15 DIAGNOSIS — Z79.4 TYPE 2 DIABETES MELLITUS WITH HYPERGLYCEMIA, WITH LONG-TERM CURRENT USE OF INSULIN: ICD-10-CM

## 2025-08-15 RX ORDER — INSULIN GLARGINE 100 [IU]/ML
30 INJECTION, SOLUTION SUBCUTANEOUS EVERY EVENING
Qty: 9 ML | Refills: 2 | Status: SHIPPED | OUTPATIENT
Start: 2025-08-15 | End: 2025-11-13

## 2025-08-21 ENCOUNTER — ANESTHESIA EVENT (OUTPATIENT)
Dept: PERIOP | Facility: HOSPITAL | Age: 85
End: 2025-08-21
Payer: MEDICARE

## 2025-08-21 ENCOUNTER — ANESTHESIA (OUTPATIENT)
Dept: PERIOP | Facility: HOSPITAL | Age: 85
End: 2025-08-21
Payer: MEDICARE

## 2025-08-21 ENCOUNTER — HOSPITAL ENCOUNTER (OUTPATIENT)
Facility: HOSPITAL | Age: 85
Setting detail: HOSPITAL OUTPATIENT SURGERY
Discharge: HOME | End: 2025-08-21
Attending: ORTHOPAEDIC SURGERY | Admitting: ORTHOPAEDIC SURGERY
Payer: MEDICARE

## 2025-08-21 PROCEDURE — 25010000002 FENTANYL CITRATE (PF) 50 MCG/ML SOLUTION: Performed by: NURSE ANESTHETIST, CERTIFIED REGISTERED

## 2025-08-21 PROCEDURE — 25010000002 CEFAZOLIN PER 500 MG: Performed by: PHYSICIAN ASSISTANT

## 2025-08-21 PROCEDURE — 25010000002 ONDANSETRON PER 1 MG: Performed by: NURSE ANESTHETIST, CERTIFIED REGISTERED

## 2025-08-21 PROCEDURE — 25010000002 LIDOCAINE PF 2% 2 % SOLUTION: Performed by: NURSE ANESTHETIST, CERTIFIED REGISTERED

## 2025-08-21 PROCEDURE — 25010000002 DEXAMETHASONE PER 1 MG: Performed by: NURSE ANESTHETIST, CERTIFIED REGISTERED

## 2025-08-21 PROCEDURE — 25010000002 PROPOFOL 10 MG/ML EMULSION: Performed by: NURSE ANESTHETIST, CERTIFIED REGISTERED

## 2025-08-21 RX ORDER — DEXAMETHASONE SODIUM PHOSPHATE 4 MG/ML
INJECTION, SOLUTION INTRA-ARTICULAR; INTRALESIONAL; INTRAMUSCULAR; INTRAVENOUS; SOFT TISSUE AS NEEDED
Status: DISCONTINUED | OUTPATIENT
Start: 2025-08-21 | End: 2025-08-21 | Stop reason: SURG

## 2025-08-21 RX ORDER — SUCCINYLCHOLINE/SOD CL,ISO/PF 100 MG/5ML
SYRINGE (ML) INTRAVENOUS AS NEEDED
Status: DISCONTINUED | OUTPATIENT
Start: 2025-08-21 | End: 2025-08-21 | Stop reason: SURG

## 2025-08-21 RX ORDER — ONDANSETRON 2 MG/ML
INJECTION INTRAMUSCULAR; INTRAVENOUS AS NEEDED
Status: DISCONTINUED | OUTPATIENT
Start: 2025-08-21 | End: 2025-08-21 | Stop reason: SURG

## 2025-08-21 RX ORDER — PROPOFOL 10 MG/ML
VIAL (ML) INTRAVENOUS AS NEEDED
Status: DISCONTINUED | OUTPATIENT
Start: 2025-08-21 | End: 2025-08-21 | Stop reason: SURG

## 2025-08-21 RX ORDER — LIDOCAINE HYDROCHLORIDE 20 MG/ML
INJECTION, SOLUTION EPIDURAL; INFILTRATION; INTRACAUDAL; PERINEURAL AS NEEDED
Status: DISCONTINUED | OUTPATIENT
Start: 2025-08-21 | End: 2025-08-21 | Stop reason: SURG

## 2025-08-21 RX ORDER — FENTANYL CITRATE 50 UG/ML
INJECTION, SOLUTION INTRAMUSCULAR; INTRAVENOUS AS NEEDED
Status: DISCONTINUED | OUTPATIENT
Start: 2025-08-21 | End: 2025-08-21 | Stop reason: SURG

## 2025-08-21 RX ADMIN — Medication 100 MG: at 07:18

## 2025-08-21 RX ADMIN — DEXAMETHASONE SODIUM PHOSPHATE 4 MG: 4 INJECTION, SOLUTION INTRAMUSCULAR; INTRAVENOUS at 07:18

## 2025-08-21 RX ADMIN — PROPOFOL 100 MG: 10 INJECTION, EMULSION INTRAVENOUS at 07:18

## 2025-08-21 RX ADMIN — ONDANSETRON 4 MG: 2 INJECTION, SOLUTION INTRAMUSCULAR; INTRAVENOUS at 07:18

## 2025-08-21 RX ADMIN — LIDOCAINE HYDROCHLORIDE 60 MG: 20 INJECTION, SOLUTION EPIDURAL; INFILTRATION; INTRACAUDAL; PERINEURAL at 07:18

## 2025-08-21 RX ADMIN — PROPOFOL 150 MCG/KG/MIN: 10 INJECTION, EMULSION INTRAVENOUS at 07:18

## 2025-08-21 RX ADMIN — SODIUM CHLORIDE 2 G: 9 INJECTION, SOLUTION INTRAVENOUS at 07:14

## 2025-08-21 RX ADMIN — FENTANYL CITRATE 25 MCG: 50 INJECTION, SOLUTION INTRAMUSCULAR; INTRAVENOUS at 07:18

## 2025-08-29 ENCOUNTER — TELEPHONE (OUTPATIENT)
Dept: ORTHOPEDIC SURGERY | Facility: CLINIC | Age: 85
End: 2025-08-29
Payer: MEDICARE

## (undated) DEVICE — GLV SURG SENSICARE SLT PF LF 7 STRL

## (undated) DEVICE — PREP SOL POVIDONE/IODINE BT 4OZ

## (undated) DEVICE — SUT VIC UD BR COAT 0 CP2 27IN

## (undated) DEVICE — STERILE POLYISOPRENE POWDER-FREE SURGICAL GLOVES: Brand: PROTEXIS

## (undated) DEVICE — FMS FLUID MANAGEMENT SYSTEM OUTFLOW TUBING WITHOUT ONE-WAY VALVE (FMS VUE OR FMS DUO PLUS): Brand: FMS

## (undated) DEVICE — COLD THERAPY BLANKET: Brand: DEROYAL

## (undated) DEVICE — TP NDL SCORPION

## (undated) DEVICE — TOWEL,OR,DSP,ST,BLUE,STD,4/PK,20PK/CS: Brand: MEDLINE

## (undated) DEVICE — SLV DISTRACTION STAR VELCRO XL DISP

## (undated) DEVICE — INTENDED FOR TISSUE SEPARATION, AND OTHER PROCEDURES THAT REQUIRE A SHARP SURGICAL BLADE TO PUNCTURE OR CUT.: Brand: BARD-PARKER ® CARBON RIB-BACK BLADES

## (undated) DEVICE — BUR BRL FORMLA 12FLUT 4MM

## (undated) DEVICE — COLD THERAPY WRAP: Brand: DEROYAL

## (undated) DEVICE — GAUZE,SPONGE,4"X4",16PLY,STRL,LF,10/TRAY: Brand: MEDLINE

## (undated) DEVICE — CANN TRPL DAM 7X7MM

## (undated) DEVICE — 90-S CRUISE, SUCTION PROBE, NON-BENDABLE, MAX CUT LEVEL 1: Brand: SERFAS ENERGY

## (undated) DEVICE — SOL IRR NACL 0.9PCT 3000ML

## (undated) DEVICE — DRSNG GZ PETROLTM XEROFORM CURAD 1X8IN STRL

## (undated) DEVICE — SHOULDER P.A.D. III: Brand: DEROYAL

## (undated) DEVICE — BLD CUT FORMLA AGGR PLS 4.0MM

## (undated) DEVICE — SUT ETHLN 3-0 FS118IN 663H

## (undated) DEVICE — FMS FLUID MANAGEMENT SYSTEM INFLOW TUBING (FMS VUE): Brand: FMS

## (undated) DEVICE — SHOULDER ARTHROSCOPY-LF: Brand: MEDLINE INDUSTRIES, INC.

## (undated) DEVICE — GLV SURG SENSICARE W/ALOE PF LF 7 STRL

## (undated) DEVICE — GLV SURG TRIUMPH CLASSIC PF LTX 8 STRL

## (undated) DEVICE — PAD GRND REM POLYHESIVE A/ DISP

## (undated) DEVICE — [THREADED CANNULA.  DO NOT RESTERILIZE,  DO NOT USE IF PACKAGE IS DAMAGED]: Brand: DRI-LOK

## (undated) DEVICE — UNDYED BRAIDED (POLYGLACTIN 910), SYNTHETIC ABSORBABLE SUTURE: Brand: COATED VICRYL

## (undated) DEVICE — GLV SURG ULTRAFREE MAX LTX PF 8